# Patient Record
Sex: MALE | Race: WHITE | Employment: FULL TIME | URBAN - METROPOLITAN AREA
[De-identification: names, ages, dates, MRNs, and addresses within clinical notes are randomized per-mention and may not be internally consistent; named-entity substitution may affect disease eponyms.]

---

## 2018-07-01 ENCOUNTER — TRANSFERRED RECORDS (OUTPATIENT)
Dept: HEALTH INFORMATION MANAGEMENT | Facility: CLINIC | Age: 38
End: 2018-07-01

## 2018-08-06 ENCOUNTER — OFFICE VISIT (OUTPATIENT)
Dept: FAMILY MEDICINE | Facility: CLINIC | Age: 38
End: 2018-08-06
Payer: COMMERCIAL

## 2018-08-06 ENCOUNTER — MYC MEDICAL ADVICE (OUTPATIENT)
Dept: FAMILY MEDICINE | Facility: CLINIC | Age: 38
End: 2018-08-06

## 2018-08-06 VITALS
HEIGHT: 76 IN | DIASTOLIC BLOOD PRESSURE: 74 MMHG | SYSTOLIC BLOOD PRESSURE: 126 MMHG | RESPIRATION RATE: 16 BRPM | WEIGHT: 197 LBS | TEMPERATURE: 98 F | HEART RATE: 65 BPM | BODY MASS INDEX: 23.99 KG/M2

## 2018-08-06 DIAGNOSIS — N46.9 MALE INFERTILITY: Primary | ICD-10-CM

## 2018-08-06 DIAGNOSIS — N46.9 INFERTILITY MALE: Primary | ICD-10-CM

## 2018-08-06 DIAGNOSIS — N43.3 LEFT HYDROCELE: ICD-10-CM

## 2018-08-06 PROCEDURE — 99213 OFFICE O/P EST LOW 20 MIN: CPT | Performed by: FAMILY MEDICINE

## 2018-08-06 NOTE — PROGRESS NOTES
SUBJECTIVE:   Serjio Farris is a 38 year old male who presents to clinic today for the following health issues:      REFERRAL      Duration: discuss today    Description (location/character/radiation): for infertility    Intensity:  mild    Accompanying signs and symptoms: wife and pt trying to conceive past 5 years    History (similar episodes/previous evaluation): did a home test checking for sperms count and it come back positive for low sperm count    Precipitating or alleviating factors: None the patient does have what sounds like a hydrocele on the left side that he has had drained a couple of times and has recurred each time.    Therapies tried and outcome: None           Problem list and histories reviewed & adjusted, as indicated.  Additional history: as documented    Patient Active Problem List   Diagnosis     Diarrhea     Flatulence, eructation, and gas pain     Left hydrocele     Past Surgical History:   Procedure Laterality Date     COLONOSCOPY N/A 4/27/2015    Procedure: COLONOSCOPY;  Surgeon: Pako Iqbal MD;  Location: [facility]     COLONOSCOPY WITH CO2 INSUFFLATION N/A 4/27/2015    Procedure: COLONOSCOPY WITH CO2 INSUFFLATION;  Surgeon: Pako Iqbal MD;  Location: [facility]     ESOPHAGOSCOPY, GASTROSCOPY, DUODENOSCOPY (EGD), COMBINED Left 5/28/2015    Procedure: COMBINED ESOPHAGOSCOPY, GASTROSCOPY, DUODENOSCOPY (EGD), BIOPSY SINGLE OR MULTIPLE;  Surgeon: Pako Iqbal MD;  Location: [facility]     HC BREATH HYDROGEN TEST N/A 9/11/2015    Procedure: HYDROGEN BREATH TEST;  Surgeon: Pako Iqbal MD;  Location: [facility]     HC TOOTH EXTRACTION W/FORCEP      age 15     UROLOGY PROCEDURE                         DATE:  2012    Testicular Torsion       Social History   Substance Use Topics     Smoking status: Former Smoker     Types: Cigarettes     Quit date: 10/1/2014     Smokeless tobacco: Never Used      Comment: 3 cigs day 13 years     Alcohol use No     Family History   Problem Relation Age of  "Onset     Breast Cancer Maternal Grandmother      Other - See Comments Paternal Grandmother      Blood Clot     Depression Mother      Depression Maternal Grandfather      Cerebrovascular Disease Paternal Grandmother            Reviewed and updated as needed this visit by clinical staff  Tobacco  Allergies  Meds  Med Hx  Surg Hx  Fam Hx  Soc Hx      Reviewed and updated as needed this visit by Provider         ROS:  Constitutional, HEENT, cardiovascular, pulmonary, gi and gu systems are negative, except as otherwise noted.    OBJECTIVE:                                                    /74 (Cuff Size: Adult Regular)  Pulse 65  Temp 98  F (36.7  C) (Tympanic)  Resp 16  Ht 6' 4\" (1.93 m)  Wt 197 lb (89.4 kg)  BMI 23.98 kg/m2  Body mass index is 23.98 kg/(m^2).  GENERAL APPEARANCE: healthy, alert and no distress   (male): testicles normal without atrophy or masses, no hernias, penis normal without urethral discharge and hydrocele present left         ASSESSMENT/PLAN:                                                        ICD-10-CM    1. Infertility male N46.9 Semen Analysis, Strict Morphology (ALICE)   2. Left hydrocele N43.3        Patient Instructions   I will set the patient up for a semen analysis.  Follow-up will be pending review of that test.      Bradford Bowman MD  [facility]    "

## 2018-08-06 NOTE — MR AVS SNAPSHOT
"              After Visit Summary   8/6/2018    Serjio Farris    MRN: [id number]           Patient Information     Date Of Birth          1980        Visit Information        Provider Department      8/6/2018 2:00 PM Bradford Bowman MD [facility]        Today's Diagnoses     Infertility male    -  1       Follow-ups after your visit        Future tests that were ordered for you today     Open Future Orders        Priority Expected Expires Ordered    Semen Analysis, Strict Morphology (ALICE) Routine  10/6/2018 8/6/2018            Who to contact     If you have questions or need follow up information about today's clinic visit or your schedule please contact [facility] directly at [phone].  Normal or non-critical lab and imaging results will be communicated to you by MyChart, letter or phone within 4 business days after the clinic has received the results. If you do not hear from us within 7 days, please contact the clinic through [facility] or phone. If you have a critical or abnormal lab result, we will notify you by phone as soon as possible.  Submit refill requests through [employer] or call your pharmacy and they will forward the refill request to us. Please allow 3 business days for your refill to be completed.          Additional Information About Your Visit        [employer] Information     [facility] lets you send messages to your doctor, view your test results, renew your prescriptions, schedule appointments and more. To sign up, go to [email or web address] . Click on \"Log in\" on the left side of the screen, which will take you to the Welcome page. Then click on \"Sign up Now\" on the right side of the page.     You will be asked to enter the access code listed below, as well as some personal information. Please follow the directions to create your username and password.     Your access code is: [email or web address]  Expires: 11/4/2018  1:56 PM   " "  Your access code will  in 90 days. If you need help or a new code, please call your [facility] clinic or [phone].        Care EveryWhere ID     This is your Care EveryWhere ID. This could be used by other organizations to access your [facility] medical records  [id number]        Your Vitals Were     Pulse Temperature Respirations Height BMI (Body Mass Index)       65 98  F (36.7  C) (Tympanic) 16 6' 4\" (1.93 m) 23.98 kg/m2        Blood Pressure from Last 3 Encounters:   18 126/74   09/28/15 146/63   07/14/15 119/70    Weight from Last 3 Encounters:   18 197 lb (89.4 kg)   09/28/15 175 lb 6.4 oz (79.6 kg)   09/11/15 180 lb (81.6 kg)               Primary Care Provider Office Phone # Fax #    Mae HARRIS Adriano, APRN [facility] [phone] [address] [place] [address]        Equal Access to Services     [facility]: Hadii aad ku hadasho Soomaali, waaxda luqadaha, qaybta kaalmada adeegyada, waxnoe moreira . So [facility] [phone].    ATENCIÓN: Si habla español, tiene a green disposición servicios gratuitos de asistencia lingüística. Llame al [phone].    We comply with applicable federal civil rights laws and [place] laws. We do not discriminate on the basis of race, color, national origin, age, disability, sex, sexual orientation, or gender identity.            Thank you!     Thank you for choosing [facility]  for your care. Our goal is always to provide you with excellent care. Hearing back from our patients is one way we can continue to improve our services. Please take a few minutes to complete the written survey that you may receive in the mail after your visit with us. Thank you!             Your Updated Medication List - Protect others around you: Learn how to safely use, store and throw away your medicines at www.disposemymeds.org.          This list is accurate as of 8/6/18  2:22 PM.  Always use " your most recent med list.                   Brand Name Dispense Instructions for use Diagnosis    hydrocortisone 25 MG Suppository    ANUSOL-HC    7 suppository    Place 1 suppository (25 mg) rectally 2 times daily    Hemorrhoids       magic mouthwash suspension    ENTER INGREDIENTS IN COMMENTS    180 mL    Swish and spit 5-10 mLs in mouth every 6 hours as needed    Oral ulcer       vitamin B complex with vitamin C Tabs tablet      Take 1 tablet by mouth daily

## 2018-08-10 ENCOUNTER — MYC MEDICAL ADVICE (OUTPATIENT)
Dept: FAMILY MEDICINE | Facility: CLINIC | Age: 38
End: 2018-08-10

## 2018-08-10 DIAGNOSIS — Z31.41 FERTILITY TESTING: Primary | ICD-10-CM

## 2018-08-10 DIAGNOSIS — Z31.41 ENCOUNTER FOR SPERM COUNT FOR FERTILITY TESTING: ICD-10-CM

## 2018-08-10 NOTE — TELEPHONE ENCOUNTER
Please advice. For semen analysis pended. I am not sure if is correct order. Pt needed for fertility purposes.

## 2018-08-10 NOTE — TELEPHONE ENCOUNTER
Future lab order was printed and to [facility] at fax   [phone]

## 2018-08-22 ENCOUNTER — TRANSFERRED RECORDS (OUTPATIENT)
Dept: HEALTH INFORMATION MANAGEMENT | Facility: CLINIC | Age: 38
End: 2018-08-22

## 2018-08-29 NOTE — TELEPHONE ENCOUNTER
Serjio,    Your semen analysis shows that your sperm count is about one third lower than it should be.  I have put in a order for a referral to urology to have them take a look.  If you have any questions feel free to contact me.    Sincerely,    Brdaford Bowman MD

## 2018-08-31 ENCOUNTER — MYC MEDICAL ADVICE (OUTPATIENT)
Dept: FAMILY MEDICINE | Facility: CLINIC | Age: 38
End: 2018-08-31

## 2019-10-22 ENCOUNTER — MYC MEDICAL ADVICE (OUTPATIENT)
Dept: FAMILY MEDICINE | Facility: CLINIC | Age: 39
End: 2019-10-22

## 2019-10-31 ENCOUNTER — MYC MEDICAL ADVICE (OUTPATIENT)
Dept: FAMILY MEDICINE | Facility: CLINIC | Age: 39
End: 2019-10-31

## 2019-11-01 NOTE — TELEPHONE ENCOUNTER
Please check first thing Monday AM for patient labs and add to OV noted.      Monday by patient.   Also fax to [facility] for patient.

## 2019-11-01 NOTE — TELEPHONE ENCOUNTER
Cant print out form requested for my patient at this time. Epic issue currently being dealt with.     Will come back to this question in a couple hours.

## 2019-11-01 NOTE — TELEPHONE ENCOUNTER
[employer] message sent to patient updating on status.     Please keep on board until this issues has been resolved for patient.

## 2020-02-25 ENCOUNTER — HOSPITAL ENCOUNTER (EMERGENCY)
Facility: CLINIC | Age: 40
Discharge: HOME | End: 2020-02-25
Attending: EMERGENCY MEDICINE | Admitting: EMERGENCY MEDICINE
Payer: COMMERCIAL

## 2020-02-25 ENCOUNTER — APPOINTMENT (OUTPATIENT)
Dept: CT IMAGING | Facility: CLINIC | Age: 40
End: 2020-02-25
Attending: EMERGENCY MEDICINE
Payer: COMMERCIAL

## 2020-02-25 VITALS
HEIGHT: 76 IN | RESPIRATION RATE: 16 BRPM | SYSTOLIC BLOOD PRESSURE: 117 MMHG | TEMPERATURE: 98 F | DIASTOLIC BLOOD PRESSURE: 82 MMHG | HEART RATE: 75 BPM | OXYGEN SATURATION: 100 % | WEIGHT: 190 LBS | BODY MASS INDEX: 23.14 KG/M2

## 2020-02-25 DIAGNOSIS — R10.9 ABDOMINAL PAIN, UNSPECIFIED ABDOMINAL LOCATION: ICD-10-CM

## 2020-02-25 DIAGNOSIS — R19.7 NAUSEA VOMITING AND DIARRHEA: ICD-10-CM

## 2020-02-25 DIAGNOSIS — R11.2 NAUSEA VOMITING AND DIARRHEA: ICD-10-CM

## 2020-02-25 LAB
ALBUMIN SERPL-MCNC: 3.8 G/DL (ref 3.4–5)
ALP SERPL-CCNC: 70 U/L (ref 40–150)
ALT SERPL W P-5'-P-CCNC: 19 U/L (ref 0–70)
ANION GAP SERPL CALCULATED.3IONS-SCNC: 4 MMOL/L (ref 3–14)
AST SERPL W P-5'-P-CCNC: 14 U/L (ref 0–45)
BASOPHILS # BLD AUTO: 0 10E9/L (ref 0–0.2)
BASOPHILS NFR BLD AUTO: 0.2 %
BILIRUB SERPL-MCNC: 1.2 MG/DL (ref 0.2–1.3)
BUN SERPL-MCNC: 13 MG/DL (ref 7–30)
CALCIUM SERPL-MCNC: 9.3 MG/DL (ref 8.5–10.1)
CHLORIDE SERPL-SCNC: 106 MMOL/L (ref 94–109)
CO2 SERPL-SCNC: 26 MMOL/L (ref 20–32)
CREAT SERPL-MCNC: 1.03 MG/DL (ref 0.66–1.25)
DIFFERENTIAL METHOD BLD: NORMAL
EOSINOPHIL # BLD AUTO: 0.1 10E9/L (ref 0–0.7)
EOSINOPHIL NFR BLD AUTO: 0.8 %
ERYTHROCYTE [DISTWIDTH] IN BLOOD BY AUTOMATED COUNT: 11.9 % (ref 10–15)
GFR SERPL CREATININE-BSD FRML MDRD: >90 ML/MIN/{1.73_M2}
GLUCOSE SERPL-MCNC: 102 MG/DL (ref 70–99)
HCT VFR BLD AUTO: 41.9 % (ref 40–53)
HGB BLD-MCNC: 14.7 G/DL (ref 13.3–17.7)
IMM GRANULOCYTES # BLD: 0 10E9/L (ref 0–0.4)
IMM GRANULOCYTES NFR BLD: 0.3 %
LACTATE BLD-SCNC: 1.7 MMOL/L (ref 0.7–2)
LIPASE SERPL-CCNC: 130 U/L (ref 73–393)
LYMPHOCYTES # BLD AUTO: 1 10E9/L (ref 0.8–5.3)
LYMPHOCYTES NFR BLD AUTO: 16.2 %
MCH RBC QN AUTO: 29.9 PG (ref 26.5–33)
MCHC RBC AUTO-ENTMCNC: 35.1 G/DL (ref 31.5–36.5)
MCV RBC AUTO: 85 FL (ref 78–100)
MONOCYTES # BLD AUTO: 0.9 10E9/L (ref 0–1.3)
MONOCYTES NFR BLD AUTO: 14.9 %
NEUTROPHILS # BLD AUTO: 4.2 10E9/L (ref 1.6–8.3)
NEUTROPHILS NFR BLD AUTO: 67.6 %
NRBC # BLD AUTO: 0 10*3/UL
NRBC BLD AUTO-RTO: 0 /100
PLATELET # BLD AUTO: 152 10E9/L (ref 150–450)
POTASSIUM SERPL-SCNC: 3.6 MMOL/L (ref 3.4–5.3)
PROT SERPL-MCNC: 7.5 G/DL (ref 6.8–8.8)
RBC # BLD AUTO: 4.92 10E12/L (ref 4.4–5.9)
SODIUM SERPL-SCNC: 136 MMOL/L (ref 133–144)
WBC # BLD AUTO: 6.2 10E9/L (ref 4–11)

## 2020-02-25 PROCEDURE — 85025 COMPLETE CBC W/AUTO DIFF WBC: CPT | Performed by: EMERGENCY MEDICINE

## 2020-02-25 PROCEDURE — 25000128 H RX IP 250 OP 636: Performed by: EMERGENCY MEDICINE

## 2020-02-25 PROCEDURE — 99285 EMERGENCY DEPT VISIT HI MDM: CPT | Mod: 25

## 2020-02-25 PROCEDURE — 96375 TX/PRO/DX INJ NEW DRUG ADDON: CPT

## 2020-02-25 PROCEDURE — 25000125 ZZHC RX 250: Performed by: EMERGENCY MEDICINE

## 2020-02-25 PROCEDURE — 83690 ASSAY OF LIPASE: CPT | Performed by: EMERGENCY MEDICINE

## 2020-02-25 PROCEDURE — 80053 COMPREHEN METABOLIC PANEL: CPT | Performed by: EMERGENCY MEDICINE

## 2020-02-25 PROCEDURE — 83605 ASSAY OF LACTIC ACID: CPT | Performed by: EMERGENCY MEDICINE

## 2020-02-25 PROCEDURE — 96361 HYDRATE IV INFUSION ADD-ON: CPT

## 2020-02-25 PROCEDURE — 96374 THER/PROPH/DIAG INJ IV PUSH: CPT | Mod: 59

## 2020-02-25 PROCEDURE — 25800030 ZZH RX IP 258 OP 636: Performed by: EMERGENCY MEDICINE

## 2020-02-25 PROCEDURE — 74177 CT ABD & PELVIS W/CONTRAST: CPT

## 2020-02-25 RX ORDER — ONDANSETRON 2 MG/ML
4 INJECTION INTRAMUSCULAR; INTRAVENOUS ONCE
Status: COMPLETED | OUTPATIENT
Start: 2020-02-25 | End: 2020-02-25

## 2020-02-25 RX ORDER — SODIUM CHLORIDE 9 MG/ML
1000 INJECTION, SOLUTION INTRAVENOUS CONTINUOUS
Status: DISCONTINUED | OUTPATIENT
Start: 2020-02-25 | End: 2020-02-25 | Stop reason: HOSPADM

## 2020-02-25 RX ORDER — KETOROLAC TROMETHAMINE 15 MG/ML
15 INJECTION, SOLUTION INTRAMUSCULAR; INTRAVENOUS ONCE
Status: COMPLETED | OUTPATIENT
Start: 2020-02-25 | End: 2020-02-25

## 2020-02-25 RX ORDER — IOPAMIDOL 755 MG/ML
95 INJECTION, SOLUTION INTRAVASCULAR ONCE
Status: COMPLETED | OUTPATIENT
Start: 2020-02-25 | End: 2020-02-25

## 2020-02-25 RX ORDER — ONDANSETRON 4 MG/1
4 TABLET, ORALLY DISINTEGRATING ORAL EVERY 8 HOURS PRN
Qty: 12 TABLET | Refills: 0 | Status: SHIPPED | OUTPATIENT
Start: 2020-02-25 | End: 2020-02-25

## 2020-02-25 RX ORDER — ONDANSETRON 4 MG/1
4 TABLET, ORALLY DISINTEGRATING ORAL EVERY 8 HOURS PRN
Qty: 12 TABLET | Refills: 0 | Status: SHIPPED | OUTPATIENT
Start: 2020-02-25 | End: 2022-06-17

## 2020-02-25 RX ADMIN — SODIUM CHLORIDE 69 ML: 9 INJECTION, SOLUTION INTRAVENOUS at 13:27

## 2020-02-25 RX ADMIN — ONDANSETRON 4 MG: 2 INJECTION INTRAMUSCULAR; INTRAVENOUS at 12:45

## 2020-02-25 RX ADMIN — KETOROLAC TROMETHAMINE 15 MG: 15 INJECTION, SOLUTION INTRAMUSCULAR; INTRAVENOUS at 14:28

## 2020-02-25 RX ADMIN — IOPAMIDOL 95 ML: 755 INJECTION, SOLUTION INTRAVENOUS at 13:27

## 2020-02-25 RX ADMIN — SODIUM CHLORIDE 1000 ML: 9 INJECTION, SOLUTION INTRAVENOUS at 12:46

## 2020-02-25 ASSESSMENT — ENCOUNTER SYMPTOMS
CHILLS: 1
NAUSEA: 1
BLOOD IN STOOL: 0
DIARRHEA: 1
ABDOMINAL PAIN: 1
CONSTIPATION: 0
VOMITING: 1
FEVER: 0

## 2020-02-25 ASSESSMENT — MIFFLIN-ST. JEOR: SCORE: 1883.33

## 2020-02-25 NOTE — ED AVS SNAPSHOT
Emergency Department  [address] [facility] [address]  Phone:  [phone]  Fax:  [phone]                                    Serjio Farris   MRN: [id number]    Department:   Emergency Department   Date of Visit:  2/25/2020           After Visit Summary Signature Page    I have received my discharge instructions, and my questions have been answered. I have discussed any challenges I see with this plan with the nurse or doctor.    ..........................................................................................................................................  Patient/Patient Representative Signature      ..........................................................................................................................................  Patient Representative Print Name and Relationship to Patient    ..................................................               ................................................  Date                                   Time    ..........................................................................................................................................  Reviewed by Signature/Title    ...................................................              ..............................................  Date                                               Time          22EPIC Rev 08/18

## 2020-02-25 NOTE — ED PROVIDER NOTES
"History     Chief Complaint:  Abdominal Pain and Nausea, Vomiting, & Diarrhea    HPI  Serjio Farris is a 39 year old male who presents to the emergency department for evaluation of abdominal pain and vomiting. The patient began vomiting early yesterday morning and was unable to keep fluids down during the day. He then developed a fever, chills, and diffuse abdominal pain worst in the right lower quadrant. The patient has not vomited today but is now having diarrhea. The patient was seen at 2 different urgent cares today with a negative UA and strep test. He and his wife are concerned that part of the cause could be a stew the patient ate. This was sitting in the fridge for a week before he ate some the night before his symptoms began. He did not get a flu shot this year.       Allergies:  No known drug allergies    Medications:    Hydrocortisone    Past Medical History:    Stroke  Left hydrocele     Past Surgical History:    urology procedure    Family History:    History reviewed. No pertinent family history.     Social History:  The patient arrives with wife  Smoking: former quit 2014  Alcohol use: no  PCP: Mae Oh  Marital Status:  [2]      Review of Systems   Constitutional: Positive for chills. Negative for fever.   Gastrointestinal: Positive for abdominal pain, diarrhea, nausea and vomiting. Negative for blood in stool and constipation.   All other systems reviewed and are negative.    Physical Exam     Patient Vitals for the past 24 hrs:   BP Temp Temp src Heart Rate Resp SpO2 Height Weight   02/25/20 1224 122/60 98  F (36.7  C) Temporal 79 16 100 % 1.93 m (6' 4\") 86.2 kg (190 lb)     Physical Exam  Physical Exam   General:  Sitting on bed with wife at bedside.   HENT:  No obvious trauma to head  Right Ear:  External ear normal.   Left Ear:  External ear normal.   Nose:  Nose normal.   Eyes:  Conjunctivae and EOM are normal. Pupils are equal, round, and reactive.   Neck: Normal " range of motion. Neck supple. No tracheal deviation present.   CV:  Normal heart sounds. No murmur heard.  Pulm/Chest: Effort normal and breath sounds normal.   Abd: Soft. No distension. Mild right lower quadrant tenderness. There is no rigidity, no rebound and no guarding.   M/S: Normal range of motion.   Neuro: Alert. GCS 15.  Skin: Skin is warm and dry. No rash noted. Not diaphoretic.   Psych: Normal mood and affect. Behavior is normal.     Emergency Department Course     Imaging:  Radiology findings were communicated with the patient who voiced understanding of the findings.    CT Abdomen Pelvis w contrast   IMPRESSION:   1.  Question of mild edema of the duodenum and potentially involving the pancreatic head. Correlate with duodenitis and edematous pancreatitis.  2.  No other acute abnormality is seen. Normal appendix.  3.  Suggestion of a left scrotal hydrocele  Reading per radiology    Laboratory:  Laboratory findings were communicated with the patient who voiced understanding of the findings.    CBC:  o/w WNL. (WBC 6.2, HGB 14.7, )   CMP: Glucose 102 (H), o/w WNL (Creatinine: 1.03)    Lactic Acid: 1.7  Lipase: 130    Interventions:  1245 Zofran 4mg IV injection   1246 NS 1L IV Bolus  1425 Toradol 15 mg IV    Emergency Department Course:  Past medical records, nursing notes, and vitals reviewed.  1229: I performed an exam of the patient and obtained history, as documented above.     IV was inserted and blood was drawn for laboratory testing, results above.  The patient was sent for a CT while in the emergency department, findings above    1415: I rechecked the patient. Explained findings to patient who is now feeling much improved. He desires to go home and is declining the influenza test at this time.    I personally reviewed the laboratory and imaging results with the Patient and spouse and answered all related questions prior to discharge.     Findings and plan explained to the Patient and spouse.  Patient discharged home with instructions regarding supportive care, medications, and reasons to return. The importance of close follow-up was reviewed.   Impression & Plan      Medical Decision Making:  Serjio Farris is a very pleasant 39 year old male who presents to the emergency department today for evaluation of nausea, vomiting and diarrhea.  The patient has mild diffuse abdominal tenderness, low but more notable in the right lower quadrant.  His symptoms began the day after eating a stew that had been in the fridge for approximately 1 week.  The patient was uncomfortable appearing.  Because of this labs were performed.  Fortunately these were unremarkable.  Due to his abdominal discomfort and symptoms, I also performed a CT scan to assess for appendicitis.  This was negative for appendicitis.  It did show some inflammation around the duodenum and pancreas.  Liver enzymes and lipase are negative.  I doubt this represents pancreatitis.  He felt better after the Zofran, fluids and Toradol.  I suspect this is related to the food that he ingested.  I do not believe stool cultures are necessary at this time.  I discussed if his symptoms are persistent beyond a few more days that stool cultures could be considered.  He agreed.    The treatment plan was discussed with the patient and they expressed understanding of this plan and consented to the plan.  In addition, the patient will return to the emergency department if their symptoms persist, worsen, if new symptoms arise or if there is any concern as other pathology may be present that is not evident at this time. They also understand the importance of close follow up in the clinic and if unable to do so will return to the emergency department for a reevaluation. All questions were answered.    Diagnosis:    ICD-10-CM   1. Nausea vomiting and diarrhea R11.2    R19.7   2. Abdominal pain, unspecified abdominal location R10.9       Disposition:   discharged to  home    Discharge Medications:     Medication List      Started    ondansetron 4 MG ODT tab  Commonly known as:  Zofran ODT  4 mg, Oral, EVERY 8 HOURS PRN            Scribe Disclosure:  I, Vannesa Florian, am serving as a scribe at 12:29 PM on 2/25/2020 to document services personally performed by Cristian Alejandre DO based on my observations and the provider's statements to me.     EMERGENCY DEPARTMENT       Cristian Alejandre DO  02/25/20 4315

## 2020-02-25 NOTE — ED TRIAGE NOTES
Sunday night into Monday bad diarrhea and vomiting for 24hrs. Unable to keep anything down. Upper abdominal pain that has gotten worse.

## 2020-03-10 ENCOUNTER — HEALTH MAINTENANCE LETTER (OUTPATIENT)
Age: 40
End: 2020-03-10

## 2020-11-04 DIAGNOSIS — Z11.3 SCREENING EXAMINATION FOR VENEREAL DISEASE: Primary | ICD-10-CM

## 2020-11-04 LAB
HBV SURFACE AB SERPL IA-ACNC: 0 M[IU]/ML
HBV SURFACE AG SERPL QL IA: NONREACTIVE
HCV AB SERPL QL IA: NONREACTIVE
HIV 1+2 AB+HIV1 P24 AG SERPL QL IA: NONREACTIVE

## 2020-11-04 PROCEDURE — 99000 SPECIMEN HANDLING OFFICE-LAB: CPT | Performed by: OBSTETRICS & GYNECOLOGY

## 2020-11-04 PROCEDURE — 87389 HIV-1 AG W/HIV-1&-2 AB AG IA: CPT | Performed by: OBSTETRICS & GYNECOLOGY

## 2020-11-04 PROCEDURE — 86780 TREPONEMA PALLIDUM: CPT | Mod: 90 | Performed by: OBSTETRICS & GYNECOLOGY

## 2020-11-04 PROCEDURE — 86706 HEP B SURFACE ANTIBODY: CPT | Performed by: OBSTETRICS & GYNECOLOGY

## 2020-11-04 PROCEDURE — 86803 HEPATITIS C AB TEST: CPT | Performed by: OBSTETRICS & GYNECOLOGY

## 2020-11-04 PROCEDURE — 36415 COLL VENOUS BLD VENIPUNCTURE: CPT | Performed by: OBSTETRICS & GYNECOLOGY

## 2020-11-04 PROCEDURE — 87340 HEPATITIS B SURFACE AG IA: CPT | Performed by: OBSTETRICS & GYNECOLOGY

## 2020-11-05 LAB — T PALLIDUM AB SER QL: NONREACTIVE

## 2020-12-20 ENCOUNTER — HEALTH MAINTENANCE LETTER (OUTPATIENT)
Age: 40
End: 2020-12-20

## 2021-03-31 ENCOUNTER — IMMUNIZATION (OUTPATIENT)
Dept: NURSING | Facility: CLINIC | Age: 41
End: 2021-03-31
Payer: COMMERCIAL

## 2021-03-31 PROCEDURE — 91300 PR COVID VAC PFIZER DIL RECON 30 MCG/0.3 ML IM: CPT

## 2021-03-31 PROCEDURE — 0001A PR COVID VAC PFIZER DIL RECON 30 MCG/0.3 ML IM: CPT

## 2021-04-21 ENCOUNTER — IMMUNIZATION (OUTPATIENT)
Dept: NURSING | Facility: CLINIC | Age: 41
End: 2021-04-21
Attending: INTERNAL MEDICINE
Payer: COMMERCIAL

## 2021-04-21 PROCEDURE — 0002A PR COVID VAC PFIZER DIL RECON 30 MCG/0.3 ML IM: CPT

## 2021-04-21 PROCEDURE — 91300 PR COVID VAC PFIZER DIL RECON 30 MCG/0.3 ML IM: CPT

## 2021-04-24 ENCOUNTER — HEALTH MAINTENANCE LETTER (OUTPATIENT)
Age: 41
End: 2021-04-24

## 2021-10-03 ENCOUNTER — HEALTH MAINTENANCE LETTER (OUTPATIENT)
Age: 41
End: 2021-10-03

## 2022-02-01 ENCOUNTER — OFFICE VISIT (OUTPATIENT)
Dept: URGENT CARE | Facility: URGENT CARE | Age: 42
End: 2022-02-01
Payer: COMMERCIAL

## 2022-02-01 ENCOUNTER — NURSE TRIAGE (OUTPATIENT)
Dept: NURSING | Facility: CLINIC | Age: 42
End: 2022-02-01

## 2022-02-01 ENCOUNTER — ANCILLARY PROCEDURE (OUTPATIENT)
Dept: GENERAL RADIOLOGY | Facility: CLINIC | Age: 42
End: 2022-02-01
Attending: PHYSICIAN ASSISTANT
Payer: COMMERCIAL

## 2022-02-01 VITALS
HEART RATE: 72 BPM | TEMPERATURE: 98.3 F | WEIGHT: 201.9 LBS | SYSTOLIC BLOOD PRESSURE: 120 MMHG | RESPIRATION RATE: 16 BRPM | BODY MASS INDEX: 24.58 KG/M2 | DIASTOLIC BLOOD PRESSURE: 72 MMHG | OXYGEN SATURATION: 100 %

## 2022-02-01 DIAGNOSIS — R05.9 COUGH: ICD-10-CM

## 2022-02-01 DIAGNOSIS — J22 LRTI (LOWER RESPIRATORY TRACT INFECTION): ICD-10-CM

## 2022-02-01 DIAGNOSIS — J22 LRTI (LOWER RESPIRATORY TRACT INFECTION): Primary | ICD-10-CM

## 2022-02-01 DIAGNOSIS — Z20.822 PERSON UNDER INVESTIGATION FOR COVID-19: ICD-10-CM

## 2022-02-01 PROCEDURE — 71046 X-RAY EXAM CHEST 2 VIEWS: CPT | Performed by: RADIOLOGY

## 2022-02-01 PROCEDURE — U0003 INFECTIOUS AGENT DETECTION BY NUCLEIC ACID (DNA OR RNA); SEVERE ACUTE RESPIRATORY SYNDROME CORONAVIRUS 2 (SARS-COV-2) (CORONAVIRUS DISEASE [COVID-19]), AMPLIFIED PROBE TECHNIQUE, MAKING USE OF HIGH THROUGHPUT TECHNOLOGIES AS DESCRIBED BY CMS-2020-01-R: HCPCS | Performed by: PHYSICIAN ASSISTANT

## 2022-02-01 PROCEDURE — 99204 OFFICE O/P NEW MOD 45 MIN: CPT | Performed by: PHYSICIAN ASSISTANT

## 2022-02-01 RX ORDER — AMOXICILLIN 500 MG/1
1000 CAPSULE ORAL 3 TIMES DAILY
Qty: 60 CAPSULE | Refills: 0 | Status: SHIPPED | OUTPATIENT
Start: 2022-02-01 | End: 2022-02-01

## 2022-02-01 RX ORDER — AZITHROMYCIN 250 MG/1
TABLET, FILM COATED ORAL
Qty: 6 TABLET | Refills: 0 | Status: SHIPPED | OUTPATIENT
Start: 2022-02-01 | End: 2022-06-17

## 2022-02-01 RX ORDER — AZITHROMYCIN 250 MG/1
TABLET, FILM COATED ORAL
Qty: 6 TABLET | Refills: 0 | Status: SHIPPED | OUTPATIENT
Start: 2022-02-01 | End: 2022-02-01

## 2022-02-01 RX ORDER — CODEINE PHOSPHATE AND GUAIFENESIN 10; 100 MG/5ML; MG/5ML
1-2 SOLUTION ORAL
Qty: 50 ML | Refills: 0 | Status: SHIPPED | OUTPATIENT
Start: 2022-02-01 | End: 2022-06-17

## 2022-02-01 RX ORDER — AMOXICILLIN 500 MG/1
1000 CAPSULE ORAL 3 TIMES DAILY
Qty: 60 CAPSULE | Refills: 0 | Status: SHIPPED | OUTPATIENT
Start: 2022-02-01 | End: 2022-06-17

## 2022-02-01 NOTE — LETTER
[facility] [place]  [address]  [address]  [place] [address]  Phone: [phone]  Fax: [phone]    February 1, 2022        Serjio Farris  [address] [address]          To whom it may concern:    RE: Serjio Farris    Patient was seen and treated today at our clinic.  Please excuse absences on 2/2 and 2/3/22.    Please contact me for questions or concerns.      Sincerely,        Dat Gross PA-C

## 2022-02-02 LAB — SARS-COV-2 RNA RESP QL NAA+PROBE: NORMAL

## 2022-02-02 NOTE — TELEPHONE ENCOUNTER
Consent given by patient to speak to wife.   PCP: none . Intends to go to [facility] in the future.   UC earlier today. 2 antibiotic prescriptions were sent to the pharmacy: [facility] [address]. They are now closed and they are unable to pick them up. .   Please retransmit to: [facility] [address], in [place]. (done @ 8:13pm).     Jennifer Ho RN Triage Nurse Advisor 8:14 PM 2/1/2022    Reason for Disposition    [1] Prescription prescribed recently is not at pharmacy AND [2] triager has access to patient's EMR AND [3] prescription is recorded in the EMR    Additional Information    Negative: Drug overdose and triager unable to answer question    Negative: Caller requesting information unrelated to medicine    Negative: Caller requesting a prescription for Strep throat and has a positive culture result    Negative: Rash while taking a medication or within 3 days of stopping it    Negative: Immunization reaction suspected    Negative: [1] Asthma and [2] having symptoms of asthma (cough, wheezing, etc.)    Negative: [1] Influenza symptoms AND [2] anti-viral med prescription request, such as Tamiflu    Negative: [1] Symptom of illness (e.g., headache, abdominal pain, earache, vomiting) AND [2] more than mild    Negative: MORE THAN A DOUBLE DOSE of a prescription or over-the-counter (OTC) drug    Negative: [1] DOUBLE DOSE (an extra dose or lesser amount) of over-the-counter (OTC) drug AND [2] any symptoms (e.g., dizziness, nausea, pain, sleepiness)    Negative: [1] DOUBLE DOSE (an extra dose or lesser amount) of prescription drug AND [2] any symptoms (e.g., dizziness, nausea, pain, sleepiness)    Negative: Took another person's prescription drug    Negative: [1] DOUBLE DOSE (an extra dose or lesser amount) of prescription drug AND [2] NO symptoms (Exception: a double dose of antibiotics)    Negative: Diabetes drug error or overdose (e.g., took wrong type of insulin or took extra  "dose)    Negative: [1] Request for URGENT new prescription or refill of \"essential\" medication (i.e., likelihood of harm to patient if not taken) AND [2] triager unable to fill per unit policy    Negative: [1] Prescription not at pharmacy AND [2] was prescribed by PCP recently    Negative: [1] Pharmacy calling with prescription questions AND [2] triager unable to answer question    Negative: [1] Caller has URGENT medication question about med that PCP or specialist prescribed AND [2] triager unable to answer question    Negative: [1] Caller has NON-URGENT medication question about med that PCP prescribed AND [2] triager unable to answer question    Negative: [1] Caller requesting a NON-URGENT new prescription or refill AND [2] triager unable to refill per unit policy    Negative: [1] Caller has medication question about med not prescribed by PCP AND [2] triager unable to answer question (e.g., compatibility with other med, storage)    Negative: Caller requesting a CONTROLLED substance prescription refill (e.g., narcotics, ADHD medicines)    Negative: Caller wants to use a complementary or alternative medicine    Protocols used: MEDICATION QUESTION CALL-A-  COVID 19 Nurse Triage Plan/Patient Instructions    Please be aware that novel coronavirus (COVID-19) may be circulating in the community. If you develop symptoms such as fever, cough, or SOB or if you have concerns about the presence of another infection including coronavirus (COVID-19), please contact your health care provider or visit [email or web address].     Disposition/Instructions    Home care recommended. Follow home care protocol based instructions.    Thank you for taking steps to prevent the spread of this virus.  o Limit your contact with others.  o Wear a simple mask to cover your cough.  o Wash your hands well and often.    Resources    [facility]: About COVID-19: [email or web address]    CDC: What to Do If You're Sick: " www.cdc.gov/coronavirus/2019-ncov/about/steps-when-sick.html    CDC: Ending Home Isolation: www.cdc.gov/coronavirus/2019-ncov/hcp/disposition-in-home-patients.html     CDC: Caring for Someone: www.cdc.gov/coronavirus/2019-ncov/if-you-are-sick/care-for-someone.html     [facility]: Interim Guidance for Hospital Discharge to Home: www.[facility].[place]./diseases/coronavirus/hcp/hospdischarge.pdf    [facility] clinical trials (COVID-19 research studies): clinicalaffairs.[facility]-clinical-trials     Below are the COVID-19 hotlines at the [place] Department of [facility] ([facility]). Interpreters are available.   o For health questions: Call 883-352-6057 or 1-929.993.8634 (7 a.m. to 7 p.m.)  o For questions about schools and childcare: Call 455-616-1056 or 1-184.469.5733 (7 a.m. to 7 p.m.)

## 2022-02-02 NOTE — PROGRESS NOTES
SUBJECTIVE:   Serjio Farris is a 41 year old male presenting with a chief complaint of cough - non-productive and burning in chest.  Onset of symptoms was 2 day(s) ago.  Course of illness is cold for 2 weeks  Severity moderate  Current and Associated symptoms: cough - non-productive for a 3-4 days ago      Past Medical History:   Diagnosis Date     Stroke (H) age 25 approx    possible     Current Outpatient Medications   Medication Sig Dispense Refill     hydrocortisone (ANUSOL-HC) 25 MG suppository Place 1 suppository (25 mg) rectally 2 times daily (Patient not taking: Reported on 2018) 7 suppository 0     MAGIC MOUTHWASH, ENTER INGREDIENTS IN COMMENTS, Swish and spit 5-10 mLs in mouth every 6 hours as needed (Patient not taking: Reported on 2018) 180 mL 1     ondansetron (ZOFRAN ODT) 4 MG ODT tab Take 1 tablet (4 mg) by mouth every 8 hours as needed for nausea (Patient not taking: Reported on 2022) 12 tablet 0     vitamin  B complex with vitamin C (VITAMIN  B COMPLEX) TABS Take 1 tablet by mouth daily (Patient not taking: Reported on 2022)       Social History     Tobacco Use     Smoking status: Former Smoker     Types: Cigarettes     Quit date: 10/1/2014     Years since quittin.3     Smokeless tobacco: Never Used     Tobacco comment: 3 cigs day 13 years   Substance Use Topics     Alcohol use: No       ROS:  Review of systems negative except as stated above.    OBJECTIVE:  /72   Pulse 72   Temp 98.3  F (36.8  C)   Resp 16   Wt 91.6 kg (201 lb 14.4 oz)   SpO2 100%   BMI 24.58 kg/m    GENERAL APPEARANCE: healthy, alert and no distress  EYES: EOMI,  PERRL, conjunctiva clear  HENT: ear canals and TM's normal.  Nose and mouth without ulcers, erythema or lesions  NECK: supple, nontender, no lymphadenopathy  RESP: lungs clear to auscultation - no rales, rhonchi or wheezes  CV: regular rates and rhythm, normal S1 S2, no murmur noted  ABDOMEN:  soft, nontender, no HSM or masses and bowel  sounds normal  NEURO: Normal strength and tone, sensory exam grossly normal,  normal speech and mentation  SKIN: no suspicious lesions or rashes    X-ray image initially interpreted in clinic by me in order to rule out pneumonia.  presence of consolidation in noted in lateral image.        ASSESSMENT:  (J22) LRTI (lower respiratory tract infection)  (primary encounter diagnosis)  Plan: guaiFENesin-codeine (ROBITUSSIN AC) 100-10         MG/5ML solution, DISCONTINUED: azithromycin         (ZITHROMAX) 250 MG tablet, DISCONTINUED:         amoxicillin (AMOXIL) 500 MG capsule    (Z20.822) Person under investigation for COVID-19  (R05.9) Cough  Plan: Symptomatic; Unknown COVID-19 Virus         (Coronavirus) by PCR Nose, XR Chest 2 Views

## 2022-02-02 NOTE — PATIENT INSTRUCTIONS
Follow up immediately with severe headache, chest pain, or shortness of breath    Rest, isolate for 10 days, hydrate, follow up if worsening or new symptoms  Household members to isolate until test results, if positive isolate for 2 weeks and follow up for testing if symptoms occur         Patient Education     Coronavirus Disease 2019 (COVID-19): Caring for Yourself or Others   If you or a household member have symptoms of COVID-19, follow the guidelines below. This will help you manage symptoms and keep the virus from spreading.  If you have symptoms of COVID-19    Stay home and contact your care team. They will tell you what to do.    Don t panic. Keep in mind that other illnesses can cause similar symptoms.    Stay away from work, school, and public places.    Limit physical contact with others in your home. Limit visitors. No kissing.  Clean surfaces you touch with disinfectant.  If you need to cough or sneeze, do it into a tissue. Then throw the tissue into the trash. If you don't have tissues, cough or sneeze into the bend of your elbow.  Don t share food or personal items with people in your household. This includes items like eating and drinking utensils, towels, and bedding.  Wear a cloth face mask around other people. During a public health emergency, medical face masks may be reserved for healthcare workers. You may need to make a cloth face mask of your own. You can do this using a bandana, T-shirt, or other cloth. The CDC has instructions on how to make a face mask. Wear the mask so that it covers both your nose and mouth.  If you need to go to a hospital or clinic, call ahead to let them know. Expect the care team to wear masks, gowns, gloves, and eye protection. You may need to come to a different entrance or wait in a separate room.  Follow all instructions from your care team.    If you ve been diagnosed with COVID-19    Stay home and away from others, including other people in your home. (This is  called self-isolation.) Don t leave home unless you need to get medical care. Don t go to work, school, or public places. Don t use buses, taxis, or other public transportation.    Follow all instructions from your care team.    If you need to go to a hospital or clinic, call ahead to let them know. Expect the care team to wear masks, gowns, gloves, and eye protection. You may need to come to a different entrance or wait in a separate room.    Wear a face mask over your nose and mouth. This is to protect others from your germs. If you can t wear a mask, your caregivers should wear one. You may need to make your own mask using a bandana, T-shirt, or other cloth. See the CDC s instructions on how to make a face mask.    Have no contact with pets and other animals.    Don t share food or personal items with people in your household. This includes items like eating and drinking utensils, towels, and bedding.    If you need to cough or sneeze, do it into a tissue. Then throw the tissue into the trash. If you don't have tissues, cough or sneeze into the bend of your elbow.    Wash your hands often.    Self-care at home   At this time, there is no medicine approved to prevent or treat COVID-19. The FDA has approved an antiviral medicine (called remdesivir) for people being treated in the hospital. This is for people 12 years and older who weigh more than about 88 pounds (40 kgs). In certain cases, it may also be used for people younger than 12 years or who weigh less than about 88 pounds (40 kgs)..  Currently, treatment is mostly aimed at helping your body while it fights the virus.    Getting extra rest.    Drink extra fluids 6 to 8 glasses of liquids each day), unless a doctor has told you not to. Ask your care team which fluids are best for you. Avoid fluids that contain caffeine or alcohol.    Taking over-the-counter (OTC) medicine to reduce pain and fever. Your care team will tell you which medicine to use.  If you ve  been in the hospital for COVID-19, follow your care team s instructions. They will tell you when to stop self-isolation. They may also have you change positions to help your breathing (such as lying on your belly).  If a test showed that you have COVID-19, you may be asked to donate plasma after you ve recovered. (This is called COVID-19 convalescent plasma donation.) The plasma may contain antibodies to help fight the virus in others. Experts don't know if the plasma will work well as a treatment. Research continues, and the FDA has approved it for emergency use in certain people with serious or life-threatening COVID-19. For more information, talk to your care team.  Caring for a sick person     Follow all instructions from the care team.    Wash your hands often.    Wear protective clothing as advised.    Make sure the sick person wears a mask. If they can't wear a mask, don t stay in the same room with them. If you must be in the same room, wear a face mask. Make sure the mask covers both the nose and mouth.    Keep track of the sick person s symptoms.    Clean surfaces often with disinfectant. This includes phones, kitchen counters, fridge door handles, bathroom surfaces, and others.    Don t let anyone share household items with the sick person. This includes eating and drinking tools, towels, sheets, or blankets.    Clean fabrics and laundry well.    Keep other people and pets away from the sick person.    When you can stop self-isolation  When you are sick with COVID-19, you should stay away from other people. This is called self-isolation. The rules for ending self-isolation depend on your health in general.  If you are normally healthy:  You can stop self-isolation when all 3 of these are true:    You ve had no fever--and no medicine that reduces fever--for at least 24 hours. And     Your symptoms are better, such as cough or trouble breathing. And     At least 10 days have passed since your symptoms first  started.  Talk with your care team before you leave home. They may tell you it s okay to leave, or they may give you different advice. You do not need to be re-tested.  If you have a weak immune system, or you ve had severe COVID-19:  Follow your care team s instructions. You may be asked to self-isolate for 10 days to 20 days after your symptoms first started. Your care team may want to re-test you for COVID-19.  Note: If you re being treated for cancer, have an immune disorder (such as HIV), or have had a transplant (organ or bone marrow), you may have a weak immune system.  If you've already had COVID-19 once:  If you had the virus over 3 months ago, and you ve been exposed again, treat it like you've never had COVID-19. Stay home, limit your contact with others, call your care team, and watch for symptoms.  If it s been less than 3 months since you had the virus, you re symptom-free, and you ve been exposed again: You don t need to self-isolate or be re-tested. But if you develop new symptoms that can t be linked to another illness, please self-isolate and contact your care team.  When you return to public settings  When you are well enough to go outside your home, follow the CDC s guidance on cloth face masks.    Anyone over age 2 should wear a face mask in public, especially when it's hard to socially distance. This includes public transit, public protests and marches, and crowded stores, bars, and restaurants.    Face masks are most likely to reduce the spread of COVID-19 when they are widely used by people who are out in the public.  Certain people should not wear a face covering. These include:    Children under 2 years old    Anyone with a health, developmental, or mental health condition that can be made worse by wearing a mask    Anyone who is unconscious or unable to remove the face covering without help. See the CDC's guidance on who should not wear a face mask.  When to call your care team  Call your  care team right away if a sick person has any of these:    Trouble breathing    Pain or pressure in chest  If a sick person has any of these, call 911:    Trouble breathing that gets worse    Pain or pressure in chest that gets worse    Blue tint to lips or face    Fast or irregular heartbeat    Confusion or trouble waking    Fainting or loss of consciousness    Coughing up blood  Going home from the hospital   If you have COVID-19 and were recently in the hospital:    Follow the instructions above for self-care and isolation.    Follow the hospital care team s instructions.    Ask questions if anything is unclear to you. Write down answers so you remember them.  Date last modified: 11/23/2020  [employer] last reviewed this educational content on 4/1/2020  This information has been modified by your health care provider with permission from the publisher.    9287-5985 [employer]. [address] [place] [address]. All rights reserved. This information is not intended as a substitute for professional medical care. Always follow your healthcare professional's instructions.               Patient Education       When You Have Pneumonia  You have been diagnosed with pneumonia. This is a serious lung infection. Most cases of pneumonia are caused by bacteria. But it can also be caused by:       Viruses    Fungi    Atypical bacteria such as mycoplasma    Inhaling certain chemicals    Pneumonia most often occurs in older adults, young children, and people with chronic health problems.   Home care    Take your medicine exactly as directed. Don t skip doses. Take your antibiotics as directed until they are all gone, even if you start to feel better. This will prevent the pneumonia from coming back.    Drink plenty of water daily, unless directed otherwise. This may help to loosen and thin lung mucus so that you can cough it up.    Use a cool-mist humidifier in your bedroom. Clean the humidifier every  day.    Don t use medicines to suppress your cough unless your cough is dry, painful, or keeps you from sleep. Coughing up mucus is normal. It helps you recover. You may use an expectorant if your healthcare provider says it s OK.    You can use warm compresses or a heating pad on the lowest setting to relieve chest discomfort. Do this several times a day for short periods of time. To prevent injury to your skin, set the temperature to warm, not hot. Don t put the compress or pad directly on your skin. Make sure it has a cover or wrap it in a towel. This is to prevent skin burns.    Get plenty of rest until your fever, shortness of breath, and chest pain go away.    Plan to get a flu shot every year. The flu is a common cause of pneumonia. Getting a flu shot every year can help prevent both the flu and pneumonia.    Getting the pneumococcal vaccine  Talk with your healthcare provider about getting the pneumococcal vaccine. There are 2 kinds of pneumonia vaccines. You may need to get both. Pneumococcal pneumonia is caused by bacteria that spread from person to person. It can cause minor problems, such as ear infections. But it can also turn into these life-threatening illnesses:       Infection of the lungs (pneumonia)    Infection of the covering of the brain and spinal cord (meningitis)    Infection of the blood (bacteremia)    People at the highest risk of pneumococcal disease include:        Children under age 2    Adults over age 65    People with certain health conditions    Smokers    This vaccine can help prevent pneumococcal disease in both adults and children. Some people should not have the vaccine. Make sure to ask your healthcare provider if you should have the vaccine.    Follow-up care  Make a follow-up appointment as directed.   When to call your healthcare provider  Call your healthcare provider right away if you have any of these:     Fever of 100.4 F ( 38 C) or higher    Mucus from the lungs  (sputum) that s yellow, green, bloody, or smells bad    A large amount of sputum    Vomiting    Symptoms that get worse    New symptoms  Call 911  Call 911 right away if you have any of these:     Chest pain    Trouble breathing    Blue, purple, or gray lips or fingernails    Feeling of doom    Feeling faint or dizzy    Trouble talking  [employer]elina last reviewed this educational content on 11/1/2021 2000-2021 [employer]. All rights reserved. This information is not intended as a substitute for professional medical care. Always follow your healthcare professional's instructions.

## 2022-02-03 ENCOUNTER — TELEPHONE (OUTPATIENT)
Dept: PEDIATRICS | Facility: CLINIC | Age: 42
End: 2022-02-03

## 2022-02-03 LAB — SARS-COV-2 RNA RESP QL NAA+PROBE: NOT DETECTED

## 2022-02-04 NOTE — TELEPHONE ENCOUNTER
"Routing to Urgent Care pool.     The Pt and his wife called in wanting the Covid results. Result is negative but they are unable to see this on [facility]. Im showing the result as being \"released\", however I see a duplicate covid test showing as \"in process\".     Please advice. Is this duplicate covid test an error? Could this be preventing the resulted covid test from being [facility]?    Fidelia Fletcher RN   [facility]  -- Triage Nurse      "
Consulted per, Dr. Garcia, the COVID is negative. Called and left message requesting call back. Please review provider notes below.  Lo Chand MA    
LVM again asking patient to call back for message below    Sheree Cheatham CMA 2/4/2022 1:59 PM     
Please notify -     COVID result is still pending, anticipate that the result should be back later today or tomorrow  
No

## 2022-02-18 ENCOUNTER — E-VISIT (OUTPATIENT)
Dept: URGENT CARE | Facility: URGENT CARE | Age: 42
End: 2022-02-18

## 2022-02-18 ENCOUNTER — E-VISIT (OUTPATIENT)
Dept: URGENT CARE | Facility: URGENT CARE | Age: 42
End: 2022-02-18
Payer: COMMERCIAL

## 2022-02-18 DIAGNOSIS — K12.0 CANKER SORE: Primary | ICD-10-CM

## 2022-02-18 DIAGNOSIS — B00.1 HERPES LABIALIS: Primary | ICD-10-CM

## 2022-02-18 PROCEDURE — 99421 OL DIG E/M SVC 5-10 MIN: CPT | Performed by: PHYSICIAN ASSISTANT

## 2022-02-18 RX ORDER — VALACYCLOVIR HYDROCHLORIDE 1 G/1
2000 TABLET, FILM COATED ORAL 2 TIMES DAILY
Qty: 4 TABLET | Refills: 1 | Status: SHIPPED | OUTPATIENT
Start: 2022-02-18 | End: 2022-06-17

## 2022-02-18 RX ORDER — TRIAMCINOLONE ACETONIDE 0.1 %
PASTE (GRAM) DENTAL 2 TIMES DAILY
Qty: 5 G | Refills: 1 | Status: SHIPPED | OUTPATIENT
Start: 2022-02-18 | End: 2022-06-17

## 2022-05-15 ENCOUNTER — HEALTH MAINTENANCE LETTER (OUTPATIENT)
Age: 42
End: 2022-05-15

## 2022-06-16 SDOH — ECONOMIC STABILITY: TRANSPORTATION INSECURITY
IN THE PAST 12 MONTHS, HAS THE LACK OF TRANSPORTATION KEPT YOU FROM MEDICAL APPOINTMENTS OR FROM GETTING MEDICATIONS?: NO

## 2022-06-16 SDOH — HEALTH STABILITY: PHYSICAL HEALTH: ON AVERAGE, HOW MANY MINUTES DO YOU ENGAGE IN EXERCISE AT THIS LEVEL?: 30 MIN

## 2022-06-16 SDOH — ECONOMIC STABILITY: TRANSPORTATION INSECURITY
IN THE PAST 12 MONTHS, HAS LACK OF TRANSPORTATION KEPT YOU FROM MEETINGS, WORK, OR FROM GETTING THINGS NEEDED FOR DAILY LIVING?: NO

## 2022-06-16 SDOH — ECONOMIC STABILITY: INCOME INSECURITY: HOW HARD IS IT FOR YOU TO PAY FOR THE VERY BASICS LIKE FOOD, HOUSING, MEDICAL CARE, AND HEATING?: NOT VERY HARD

## 2022-06-16 SDOH — HEALTH STABILITY: PHYSICAL HEALTH: ON AVERAGE, HOW MANY DAYS PER WEEK DO YOU ENGAGE IN MODERATE TO STRENUOUS EXERCISE (LIKE A BRISK WALK)?: 3 DAYS

## 2022-06-16 SDOH — ECONOMIC STABILITY: FOOD INSECURITY: WITHIN THE PAST 12 MONTHS, YOU WORRIED THAT YOUR FOOD WOULD RUN OUT BEFORE YOU GOT MONEY TO BUY MORE.: NEVER TRUE

## 2022-06-16 SDOH — ECONOMIC STABILITY: INCOME INSECURITY: IN THE LAST 12 MONTHS, WAS THERE A TIME WHEN YOU WERE NOT ABLE TO PAY THE MORTGAGE OR RENT ON TIME?: NO

## 2022-06-16 SDOH — ECONOMIC STABILITY: FOOD INSECURITY: WITHIN THE PAST 12 MONTHS, THE FOOD YOU BOUGHT JUST DIDN'T LAST AND YOU DIDN'T HAVE MONEY TO GET MORE.: NEVER TRUE

## 2022-06-16 ASSESSMENT — LIFESTYLE VARIABLES
HOW OFTEN DO YOU HAVE SIX OR MORE DRINKS ON ONE OCCASION: NEVER
HOW OFTEN DO YOU HAVE A DRINK CONTAINING ALCOHOL: 2-4 TIMES A MONTH
HOW MANY STANDARD DRINKS CONTAINING ALCOHOL DO YOU HAVE ON A TYPICAL DAY: 1 OR 2
AUDIT-C TOTAL SCORE: 2
SKIP TO QUESTIONS 9-10: 1

## 2022-06-16 ASSESSMENT — ENCOUNTER SYMPTOMS
ABDOMINAL PAIN: 0
COUGH: 0
MYALGIAS: 0
CHILLS: 0
EYE PAIN: 0
HEMATOCHEZIA: 0
ARTHRALGIAS: 0
HEMATURIA: 0
DIARRHEA: 0
SHORTNESS OF BREATH: 0
FEVER: 0
PARESTHESIAS: 0
PALPITATIONS: 0
HEARTBURN: 0
NERVOUS/ANXIOUS: 0
SORE THROAT: 0
DYSURIA: 0
FREQUENCY: 0
JOINT SWELLING: 0
HEADACHES: 0
DIZZINESS: 0
WEAKNESS: 0
NAUSEA: 0
CONSTIPATION: 0

## 2022-06-16 ASSESSMENT — SOCIAL DETERMINANTS OF HEALTH (SDOH)
HOW OFTEN DO YOU GET TOGETHER WITH FRIENDS OR RELATIVES?: ONCE A WEEK
DO YOU BELONG TO ANY CLUBS OR ORGANIZATIONS SUCH AS CHURCH GROUPS UNIONS, FRATERNAL OR ATHLETIC GROUPS, OR SCHOOL GROUPS?: NO
IN A TYPICAL WEEK, HOW MANY TIMES DO YOU TALK ON THE PHONE WITH FAMILY, FRIENDS, OR NEIGHBORS?: THREE TIMES A WEEK
HOW OFTEN DO YOU ATTEND CHURCH OR RELIGIOUS SERVICES?: NEVER

## 2022-06-17 ENCOUNTER — OFFICE VISIT (OUTPATIENT)
Dept: FAMILY MEDICINE | Facility: CLINIC | Age: 42
End: 2022-06-17
Payer: COMMERCIAL

## 2022-06-17 VITALS
HEART RATE: 75 BPM | DIASTOLIC BLOOD PRESSURE: 77 MMHG | RESPIRATION RATE: 16 BRPM | OXYGEN SATURATION: 99 % | HEIGHT: 77 IN | TEMPERATURE: 97.5 F | WEIGHT: 193 LBS | BODY MASS INDEX: 22.79 KG/M2 | SYSTOLIC BLOOD PRESSURE: 126 MMHG

## 2022-06-17 DIAGNOSIS — Z00.00 ROUTINE GENERAL MEDICAL EXAMINATION AT A HEALTH CARE FACILITY: Primary | ICD-10-CM

## 2022-06-17 DIAGNOSIS — Z86.73 HISTORY OF CVA (CEREBROVASCULAR ACCIDENT): ICD-10-CM

## 2022-06-17 DIAGNOSIS — Z13.220 SCREENING FOR HYPERLIPIDEMIA: ICD-10-CM

## 2022-06-17 LAB
ALBUMIN SERPL-MCNC: 3.7 G/DL (ref 3.4–5)
ALP SERPL-CCNC: 66 U/L (ref 40–150)
ALT SERPL W P-5'-P-CCNC: 21 U/L (ref 0–70)
ANION GAP SERPL CALCULATED.3IONS-SCNC: 2 MMOL/L (ref 3–14)
AST SERPL W P-5'-P-CCNC: 18 U/L (ref 0–45)
BASOPHILS # BLD AUTO: 0 10E3/UL (ref 0–0.2)
BASOPHILS NFR BLD AUTO: 0 %
BILIRUB SERPL-MCNC: 0.6 MG/DL (ref 0.2–1.3)
BUN SERPL-MCNC: 14 MG/DL (ref 7–30)
CALCIUM SERPL-MCNC: 8.8 MG/DL (ref 8.5–10.1)
CHLORIDE BLD-SCNC: 111 MMOL/L (ref 94–109)
CHOLEST SERPL-MCNC: 223 MG/DL
CO2 SERPL-SCNC: 29 MMOL/L (ref 20–32)
CREAT SERPL-MCNC: 0.95 MG/DL (ref 0.66–1.25)
EOSINOPHIL # BLD AUTO: 0.2 10E3/UL (ref 0–0.7)
EOSINOPHIL NFR BLD AUTO: 3 %
ERYTHROCYTE [DISTWIDTH] IN BLOOD BY AUTOMATED COUNT: 12.8 % (ref 10–15)
FASTING STATUS PATIENT QL REPORTED: NO
GFR SERPL CREATININE-BSD FRML MDRD: >90 ML/MIN/1.73M2
GLUCOSE BLD-MCNC: 76 MG/DL (ref 70–99)
HCT VFR BLD AUTO: 42.1 % (ref 40–53)
HDLC SERPL-MCNC: 47 MG/DL
HGB BLD-MCNC: 14.6 G/DL (ref 13.3–17.7)
LDLC SERPL CALC-MCNC: 142 MG/DL
LYMPHOCYTES # BLD AUTO: 1.7 10E3/UL (ref 0.8–5.3)
LYMPHOCYTES NFR BLD AUTO: 34 %
MCH RBC QN AUTO: 30.9 PG (ref 26.5–33)
MCHC RBC AUTO-ENTMCNC: 34.7 G/DL (ref 31.5–36.5)
MCV RBC AUTO: 89 FL (ref 78–100)
MONOCYTES # BLD AUTO: 0.5 10E3/UL (ref 0–1.3)
MONOCYTES NFR BLD AUTO: 10 %
NEUTROPHILS # BLD AUTO: 2.8 10E3/UL (ref 1.6–8.3)
NEUTROPHILS NFR BLD AUTO: 53 %
NONHDLC SERPL-MCNC: 176 MG/DL
PLATELET # BLD AUTO: 180 10E3/UL (ref 150–450)
POTASSIUM BLD-SCNC: 4 MMOL/L (ref 3.4–5.3)
PROT SERPL-MCNC: 7 G/DL (ref 6.8–8.8)
RBC # BLD AUTO: 4.73 10E6/UL (ref 4.4–5.9)
SODIUM SERPL-SCNC: 142 MMOL/L (ref 133–144)
TRIGL SERPL-MCNC: 170 MG/DL
WBC # BLD AUTO: 5.2 10E3/UL (ref 4–11)

## 2022-06-17 PROCEDURE — 99396 PREV VISIT EST AGE 40-64: CPT | Performed by: FAMILY MEDICINE

## 2022-06-17 PROCEDURE — 36415 COLL VENOUS BLD VENIPUNCTURE: CPT | Performed by: FAMILY MEDICINE

## 2022-06-17 PROCEDURE — 80053 COMPREHEN METABOLIC PANEL: CPT | Performed by: FAMILY MEDICINE

## 2022-06-17 PROCEDURE — 85025 COMPLETE CBC W/AUTO DIFF WBC: CPT | Performed by: FAMILY MEDICINE

## 2022-06-17 PROCEDURE — 80061 LIPID PANEL: CPT | Performed by: FAMILY MEDICINE

## 2022-06-17 ASSESSMENT — ENCOUNTER SYMPTOMS
PARESTHESIAS: 0
HEARTBURN: 0
MYALGIAS: 0
SHORTNESS OF BREATH: 0
HEADACHES: 0
WEAKNESS: 0
HEMATURIA: 0
CHILLS: 0
DYSURIA: 0
ARTHRALGIAS: 0
DIZZINESS: 0
NAUSEA: 0
PALPITATIONS: 0
SORE THROAT: 0
JOINT SWELLING: 0
FREQUENCY: 0
EYE PAIN: 0
HEMATOCHEZIA: 0
ABDOMINAL PAIN: 0
DIARRHEA: 0
CONSTIPATION: 0
FEVER: 0
NERVOUS/ANXIOUS: 0
COUGH: 0

## 2022-06-17 NOTE — PROGRESS NOTES
SUBJECTIVE:   CC: Serjio Farris is an 41 year old male who presents for preventative health visit.       Patient has been advised of split billing requirements and indicates understanding: Yes  Healthy Habits:     Getting at least 3 servings of Calcium per day:  Yes    Bi-annual eye exam:  Yes    Dental care twice a year:  Yes    Sleep apnea or symptoms of sleep apnea:  None    Diet:  Regular (no restrictions) and Other    Frequency of exercise:  2-3 days/week    Duration of exercise:  15-30 minutes    Taking medications regularly:  Yes    Medication side effects:  None    PHQ-2 Total Score: 2    Additional concerns today:  No    Embarking on adoption journey and needs a physical for that. No acute complaints today.       Today's PHQ-2 Score:   PHQ-2 (  [employer]) 2022   Q1: Little interest or pleasure in doing things 1   Q2: Feeling down, depressed or hopeless 1   PHQ-2 Score 2   Q1: Little interest or pleasure in doing things Several days   Q2: Feeling down, depressed or hopeless Several days   PHQ-2 Score 2       Abuse: Current or Past(Physical, Sexual or Emotional)- No  Do you feel safe in your environment? Yes        Social History     Tobacco Use     Smoking status: Former Smoker     Packs/day: 0.10     Years: 10.00     Pack years: 1.00     Types: Cigarettes, Cigarettes     Start date: 1999     Quit date: 2014     Years since quittin.7     Smokeless tobacco: Never Used     Tobacco comment: 3 cigs day 13 years   Substance Use Topics     Alcohol use: Yes     Comment: Extremely rarely (major occassasions) bad affects on stomach     If you drink alcohol do you typically have >3 drinks per day or >7 drinks per week? No    Alcohol Use 2022   Prescreen: >3 drinks/day or >7 drinks/week? No   No flowsheet data found.    Last PSA: No results found for: PSA    Reviewed orders with patient. Reviewed health maintenance and updated orders accordingly - Yes  Labs reviewed in EPIC    Reviewed and  "updated as needed this visit by clinical staff   Tobacco  Allergies    Med Hx  Surg Hx  Fam Hx  Soc Hx          Reviewed and updated as needed this visit by Provider                       Review of Systems   Constitutional: Negative for chills and fever.   HENT: Negative for congestion, ear pain, hearing loss and sore throat.    Eyes: Negative for pain and visual disturbance.   Respiratory: Negative for cough and shortness of breath.    Cardiovascular: Negative for chest pain and palpitations.   Gastrointestinal: Negative for abdominal pain, constipation, diarrhea, heartburn, hematochezia and nausea.   Genitourinary: Negative for dysuria, frequency, genital sores, hematuria, impotence, penile discharge and urgency.   Musculoskeletal: Negative for arthralgias, joint swelling and myalgias.   Skin: Negative for rash.   Neurological: Negative for dizziness, weakness, headaches and paresthesias.   Psychiatric/Behavioral: Negative for mood changes. The patient is not nervous/anxious.        OBJECTIVE:   /77 (BP Location: Right arm, Patient Position: Chair, Cuff Size: Adult Regular)   Pulse 75   Temp 97.5  F (36.4  C) (Oral)   Resp 16   Ht 1.956 m (6' 5\")   Wt 87.5 kg (193 lb)   SpO2 99%   BMI 22.89 kg/m      Physical Exam  GENERAL: healthy, alert and no distress  EYES: Eyes grossly normal to inspection, PERRL and conjunctivae and sclerae normal  HENT: ear canals and TM's normal, nose and mouth without ulcers or lesions  NECK: no adenopathy, no asymmetry, masses, or scars and thyroid normal to palpation  RESP: lungs clear to auscultation - no rales, rhonchi or wheezes  CV: regular rate and rhythm, normal S1 S2, no S3 or S4, no murmur, click or rub, no peripheral edema and peripheral pulses strong  ABDOMEN: soft, nontender, no hepatosplenomegaly, no masses and bowel sounds normal  MS: no gross musculoskeletal defects noted, no edema  SKIN: no suspicious lesions or rashes  NEURO: Normal strength and tone, " "mentation intact and speech normal  PSYCH: mentation appears normal, affect normal/bright    Diagnostic Test Results:  Labs reviewed in Epic  none     ASSESSMENT/PLAN:   (Z00.00) Routine general medical examination at a health care facility  (primary encounter diagnosis)  Plan: Comprehensive metabolic panel (BMP + Alb, Alk         Phos, ALT, AST, Total. Bili, TP), Lipid panel         reflex to direct LDL Non-fasting, CBC with         platelets and differential      (Z13.220) Screening for hyperlipidemia  Plan: Lipid panel reflex to direct LDL Non-fasting      (Z86.73) History of CVA (cerebrovascular accident)  Comment: patient doing well since CVA in 2002      Patient has been advised of split billing requirements and indicates understanding: Yes    COUNSELING:   Reviewed preventive health counseling, as reflected in patient instructions       Regular exercise       Healthy diet/nutrition    Estimated body mass index is 22.89 kg/m  as calculated from the following:    Height as of this encounter: 1.956 m (6' 5\").    Weight as of this encounter: 87.5 kg (193 lb).         He reports that he quit smoking about 7 years ago. His smoking use included cigarettes and cigarettes. He started smoking about 23 years ago. He has a 1.00 pack-year smoking history. He has never used smokeless tobacco.      Counseling Resources:  ATP IV Guidelines  Pooled Cohorts Equation Calculator  FRAX Risk Assessment  ICSI Preventive Guidelines  Dietary Guidelines for Americans, 2010  USDA's MyPlate  ASA Prophylaxis  Lung CA Screening    Taylor Walsh MD  [facility]"

## 2022-07-12 ENCOUNTER — E-VISIT (OUTPATIENT)
Dept: FAMILY MEDICINE | Facility: CLINIC | Age: 42
End: 2022-07-12

## 2022-07-12 DIAGNOSIS — Z53.9 ERRONEOUS ENCOUNTER--DISREGARD: Primary | ICD-10-CM

## 2022-07-12 ASSESSMENT — PATIENT HEALTH QUESTIONNAIRE - PHQ9
SUM OF ALL RESPONSES TO PHQ QUESTIONS 1-9: 18
SUM OF ALL RESPONSES TO PHQ QUESTIONS 1-9: 18
10. IF YOU CHECKED OFF ANY PROBLEMS, HOW DIFFICULT HAVE THESE PROBLEMS MADE IT FOR YOU TO DO YOUR WORK, TAKE CARE OF THINGS AT HOME, OR GET ALONG WITH OTHER PEOPLE: SOMEWHAT DIFFICULT

## 2022-07-12 ASSESSMENT — ANXIETY QUESTIONNAIRES
5. BEING SO RESTLESS THAT IT IS HARD TO SIT STILL: SEVERAL DAYS
7. FEELING AFRAID AS IF SOMETHING AWFUL MIGHT HAPPEN: SEVERAL DAYS
6. BECOMING EASILY ANNOYED OR IRRITABLE: MORE THAN HALF THE DAYS
GAD7 TOTAL SCORE: 15
3. WORRYING TOO MUCH ABOUT DIFFERENT THINGS: NEARLY EVERY DAY
7. FEELING AFRAID AS IF SOMETHING AWFUL MIGHT HAPPEN: SEVERAL DAYS
1. FEELING NERVOUS, ANXIOUS, OR ON EDGE: MORE THAN HALF THE DAYS
2. NOT BEING ABLE TO STOP OR CONTROL WORRYING: NEARLY EVERY DAY
GAD7 TOTAL SCORE: 15
GAD7 TOTAL SCORE: 15
8. IF YOU CHECKED OFF ANY PROBLEMS, HOW DIFFICULT HAVE THESE MADE IT FOR YOU TO DO YOUR WORK, TAKE CARE OF THINGS AT HOME, OR GET ALONG WITH OTHER PEOPLE?: VERY DIFFICULT
4. TROUBLE RELAXING: NEARLY EVERY DAY

## 2022-07-13 ASSESSMENT — ANXIETY QUESTIONNAIRES: GAD7 TOTAL SCORE: 15

## 2022-07-13 ASSESSMENT — PATIENT HEALTH QUESTIONNAIRE - PHQ9: SUM OF ALL RESPONSES TO PHQ QUESTIONS 1-9: 18

## 2022-07-13 NOTE — PROGRESS NOTES
Patient reporting anxiety and depression. I don't see that he is on medication. Will need at least a virtual visit to discuss and figure out best medication options for him.   I'm happy to see him virtually next Tuesday    NWFLORENCIO

## 2022-07-14 ENCOUNTER — TELEPHONE (OUTPATIENT)
Dept: FAMILY MEDICINE | Facility: CLINIC | Age: 42
End: 2022-07-14

## 2022-07-14 NOTE — TELEPHONE ENCOUNTER
left message for call back, routed to triage  Mary Jane Vazquez RN      Patient reporting anxiety and depression. I don't see that he is on medication. Will need at least a virtual visit to discuss and figure out best medication options for him.   I'm happy to see him virtually next Tuesday     NWD    Mary Jane Vazquez RN, BSN  [facility]

## 2022-07-14 NOTE — TELEPHONE ENCOUNTER
Pt calls, informed, agrees, virtual visit scheduled  Mary Jane Vazquez RN, BSN  [facility]

## 2022-07-19 ENCOUNTER — VIRTUAL VISIT (OUTPATIENT)
Dept: FAMILY MEDICINE | Facility: CLINIC | Age: 42
End: 2022-07-19
Payer: COMMERCIAL

## 2022-07-19 DIAGNOSIS — F41.1 GENERALIZED ANXIETY DISORDER: ICD-10-CM

## 2022-07-19 DIAGNOSIS — F33.1 MODERATE EPISODE OF RECURRENT MAJOR DEPRESSIVE DISORDER (H): Primary | ICD-10-CM

## 2022-07-19 PROCEDURE — 99214 OFFICE O/P EST MOD 30 MIN: CPT | Mod: 95 | Performed by: FAMILY MEDICINE

## 2022-07-19 NOTE — PROGRESS NOTES
Serjio is a 42 year old who is being evaluated via a billable video visit.      How would you like to obtain your AVS? MyChart  If the video visit is dropped, the invitation should be resent by: Text to cell phone: [phone]   Will anyone else be joining your video visit? No          Assessment & Plan     Moderate episode of recurrent major depressive disorder (H)  - reviewed current symptoms and treatment options. Will start on zoloft for now. Also advised to follow up with therapist. Medication use and side effects discussed with the patient. Patient is in complete understanding and agreement with plan.   - sertraline (ZOLOFT) 50 MG tablet; Take 0.5 tablets (25 mg) by mouth daily X 1 week then increase to 1 tablet daily    Generalized anxiety disorder  - needs improvement. Trial of zoloft today. Recheck in 5-6 weeks.   - sertraline (ZOLOFT) 50 MG tablet; Take 0.5 tablets (25 mg) by mouth daily X 1 week then increase to 1 tablet daily     30 minutes spent on the date of the encounter doing chart review, history and exam, documentation and further activities per the note       See Patient Instructions    Return in about 5 weeks (around 8/23/2022) for medication recheck, virtual, - anxiety/depression.   Follow-up Visit   Expected date: Aug 23, 2022      Follow Up Appointment Details:     Follow-up with whom?: Me    Follow-Up for what?: Other (Office Visit)    Additional Details: anxiety/depression    How?: Video                    Taylor Walsh MD  [facility]    Dameon Bassett is a 42 year old, presenting for the following health issues:  Depression      History of Present Illness       Reason for visit:  Ongoing depression    He eats 2-3 servings of fruits and vegetables daily.He consumes 1 sweetened beverage(s) daily.He exercises with enough effort to increase his heart rate 10 to 19 minutes per day.  He exercises with enough effort to increase his  "heart rate 3 or less days per week.   He is taking medications regularly.       Abnormal Mood Symptoms  Onset/Duration: on/off years  Description:   Depression (if yes, do PHQ-9): YES  Anxiety (if yes, do WILMAR-7): YES  Accompanying Signs & Symptoms:  Still participating in activities that you used to enjoy: No  Fatigue: YES  Irritability: YES  Difficulty concentrating: YES  Changes in appetite: No  Problems with sleep: YES- unable to stay asleep  Heart racing/beating fast: No  Abnormally elevated, expansive, or irritable mood: YES  Persistently increased activity or energy: No  Thoughts of hurting yourself or others: No  History:  Recent stress or major life event: YES- recently found out unable to have children and missing family back in [facility].  Prior depression or anxiety: yes  Family history of depression or anxiety: YES- both parents suffer from depression  Alcohol/drug use: No  Difficulty sleeping: YES  Precipitating or alleviating factors: None  Therapies tried and outcome: Counseling when they found out they could not have children  for 3-4 months  PHQ 7/12/2022   PHQ-9 Total Score 18   Q9: Thoughts of better off dead/self-harm past 2 weeks Not at all     WILMAR-7 SCORE 7/12/2022   Total Score 15 (severe anxiety)   Total Score 15     Per patient he tried prozac in the past but it made him feel like a zombie. He is currently feeling like in a roller coaster due to current infertility issues.     Review of Systems   Constitutional, HEENT, cardiovascular, pulmonary, GI, , musculoskeletal, neuro, skin, endocrine and psych systems are negative, except as otherwise noted.      Objective    Vitals - Patient Reported  Weight (Patient Reported): 88.5 kg (195 lb)  Height (Patient Reported): 193 cm (6' 4\")  BMI (Based on Pt Reported Ht/Wt): 23.74      Vitals:  No vitals were obtained today due to virtual visit.    Physical Exam   GENERAL: Healthy, alert and no distress  EYES: Eyes grossly normal to inspection.  " No discharge or erythema, or obvious scleral/conjunctival abnormalities.  RESP: No audible wheeze, cough, or visible cyanosis.  No visible retractions or increased work of breathing.    PSYCH: Mentation appears normal, affect normal/bright, judgement and insight intact, normal speech and appearance well-groomed.            Video-Visit Details    Video Start Time: 2:19 PM    Type of service:  Video Visit    Video End Time:2:33 PM    Originating Location (pt. Location): Home    Distant Location (provider location):  [facility] [place]     Platform used for Video Visit: [email or web address]    Yamilet Woodard.

## 2022-08-24 ENCOUNTER — MEDICAL CORRESPONDENCE (OUTPATIENT)
Dept: HEALTH INFORMATION MANAGEMENT | Facility: CLINIC | Age: 42
End: 2022-08-24

## 2022-08-24 ENCOUNTER — TRANSFERRED RECORDS (OUTPATIENT)
Dept: HEALTH INFORMATION MANAGEMENT | Facility: CLINIC | Age: 42
End: 2022-08-24

## 2022-08-25 ENCOUNTER — TELEPHONE (OUTPATIENT)
Dept: RHEUMATOLOGY | Facility: CLINIC | Age: 42
End: 2022-08-25

## 2022-08-25 NOTE — TELEPHONE ENCOUNTER
[facility] Call Center    Phone Message    May a detailed message be left on voicemail: yes     Reason for Call: Appointment Intake    Referring Provider Name: Reji Fuentes  Diagnosis and/or Symptoms: Arteritis, unspecified      Pt requesting to be seen at [facility]. Please review chart and advise on who to schedule with. Referral is requesting pt been seen within 1-2 weeks.     Action Taken: Other: [facility]    Travel Screening: Not Applicable

## 2022-08-28 ASSESSMENT — PATIENT HEALTH QUESTIONNAIRE - PHQ9
10. IF YOU CHECKED OFF ANY PROBLEMS, HOW DIFFICULT HAVE THESE PROBLEMS MADE IT FOR YOU TO DO YOUR WORK, TAKE CARE OF THINGS AT HOME, OR GET ALONG WITH OTHER PEOPLE: SOMEWHAT DIFFICULT
SUM OF ALL RESPONSES TO PHQ QUESTIONS 1-9: 12
SUM OF ALL RESPONSES TO PHQ QUESTIONS 1-9: 12

## 2022-08-28 ASSESSMENT — ANXIETY QUESTIONNAIRES
1. FEELING NERVOUS, ANXIOUS, OR ON EDGE: SEVERAL DAYS
IF YOU CHECKED OFF ANY PROBLEMS ON THIS QUESTIONNAIRE, HOW DIFFICULT HAVE THESE PROBLEMS MADE IT FOR YOU TO DO YOUR WORK, TAKE CARE OF THINGS AT HOME, OR GET ALONG WITH OTHER PEOPLE: SOMEWHAT DIFFICULT
4. TROUBLE RELAXING: MORE THAN HALF THE DAYS
GAD7 TOTAL SCORE: 11
2. NOT BEING ABLE TO STOP OR CONTROL WORRYING: SEVERAL DAYS
GAD7 TOTAL SCORE: 11
7. FEELING AFRAID AS IF SOMETHING AWFUL MIGHT HAPPEN: SEVERAL DAYS
8. IF YOU CHECKED OFF ANY PROBLEMS, HOW DIFFICULT HAVE THESE MADE IT FOR YOU TO DO YOUR WORK, TAKE CARE OF THINGS AT HOME, OR GET ALONG WITH OTHER PEOPLE?: SOMEWHAT DIFFICULT
5. BEING SO RESTLESS THAT IT IS HARD TO SIT STILL: MORE THAN HALF THE DAYS
6. BECOMING EASILY ANNOYED OR IRRITABLE: NEARLY EVERY DAY
3. WORRYING TOO MUCH ABOUT DIFFERENT THINGS: SEVERAL DAYS
7. FEELING AFRAID AS IF SOMETHING AWFUL MIGHT HAPPEN: SEVERAL DAYS

## 2022-08-29 ENCOUNTER — VIRTUAL VISIT (OUTPATIENT)
Dept: FAMILY MEDICINE | Facility: CLINIC | Age: 42
End: 2022-08-29
Attending: FAMILY MEDICINE
Payer: COMMERCIAL

## 2022-08-29 ENCOUNTER — MYC REFILL (OUTPATIENT)
Dept: FAMILY MEDICINE | Facility: CLINIC | Age: 42
End: 2022-08-29

## 2022-08-29 DIAGNOSIS — F33.1 MODERATE EPISODE OF RECURRENT MAJOR DEPRESSIVE DISORDER (H): ICD-10-CM

## 2022-08-29 DIAGNOSIS — F41.1 GENERALIZED ANXIETY DISORDER: ICD-10-CM

## 2022-08-29 PROCEDURE — 99213 OFFICE O/P EST LOW 20 MIN: CPT | Mod: 95 | Performed by: FAMILY MEDICINE

## 2022-08-29 ASSESSMENT — PATIENT HEALTH QUESTIONNAIRE - PHQ9
10. IF YOU CHECKED OFF ANY PROBLEMS, HOW DIFFICULT HAVE THESE PROBLEMS MADE IT FOR YOU TO DO YOUR WORK, TAKE CARE OF THINGS AT HOME, OR GET ALONG WITH OTHER PEOPLE: SOMEWHAT DIFFICULT
SUM OF ALL RESPONSES TO PHQ QUESTIONS 1-9: 12

## 2022-08-29 ASSESSMENT — ANXIETY QUESTIONNAIRES: GAD7 TOTAL SCORE: 11

## 2022-08-29 ASSESSMENT — ENCOUNTER SYMPTOMS: NERVOUS/ANXIOUS: 1

## 2022-08-29 NOTE — TELEPHONE ENCOUNTER
Spoke with pt and wife, they have not heard anything back and wondering when they can schedule at the [facility].

## 2022-08-29 NOTE — TELEPHONE ENCOUNTER
Discussed with Dr. Vann.  Can offer pt an appt on 9/14 at 1 pm.    Melida Best RN  Rheumatology Clinic

## 2022-08-30 NOTE — TELEPHONE ENCOUNTER
NOTES Status Details   OFFICE NOTE from referring provider     OFFICE NOTE from other specialist     DISCHARGE SUMMARY from hospital     DISCHARGE REPORT from the ER Care Everywhere 08.24.2022 Reji Fuentes, [facility]     MEDICATION LIST Internal    LABS (Any and all labs)      CE /Internal    Biopsy/pathology (Anything related to diagnoses I.e. fluid aspirations, lip biopsy, muscle biopsy)               Imaging (All imaging related to diagnoses)     Echo     HRCT     CXR     EMG                    Scleroderma/Dermatomyositis diagnoses     Previous Cardiology notes      Previous Pulmonary notes     Previous Dermatology notes     Previous GI notes     Lupus diagnoses     Previous Nephrology notes     Previous Dermatology notes     Previous Cardiology notes

## 2022-09-07 ENCOUNTER — MYC REFILL (OUTPATIENT)
Dept: FAMILY MEDICINE | Facility: CLINIC | Age: 42
End: 2022-09-07

## 2022-09-07 DIAGNOSIS — F33.1 MODERATE EPISODE OF RECURRENT MAJOR DEPRESSIVE DISORDER (H): ICD-10-CM

## 2022-09-07 DIAGNOSIS — F41.1 GENERALIZED ANXIETY DISORDER: ICD-10-CM

## 2022-09-09 NOTE — TELEPHONE ENCOUNTER
Spoke to pharmacy staff at [facility], patient picked up Sertraline rx yesterday. Will refuse this request.     Left message for patient to call back and ask to speak to a triage nurse if there are any questions in regards to this refill.     Patricia BURGER RN, BSN, PHN  [facility]

## 2022-09-11 ENCOUNTER — HEALTH MAINTENANCE LETTER (OUTPATIENT)
Age: 42
End: 2022-09-11

## 2022-09-12 DIAGNOSIS — F33.1 MODERATE EPISODE OF RECURRENT MAJOR DEPRESSIVE DISORDER (H): ICD-10-CM

## 2022-09-12 DIAGNOSIS — F41.1 GENERALIZED ANXIETY DISORDER: ICD-10-CM

## 2022-09-12 NOTE — TELEPHONE ENCOUNTER
Pt was given 3 months of medication, should not need refill yet.     90 tablet 0 8/29/2022     Asya Quarles, RN, BSN, PHN  [facility]

## 2022-09-14 ENCOUNTER — LAB (OUTPATIENT)
Dept: LAB | Facility: CLINIC | Age: 42
End: 2022-09-14
Payer: COMMERCIAL

## 2022-09-14 ENCOUNTER — OFFICE VISIT (OUTPATIENT)
Dept: RHEUMATOLOGY | Facility: CLINIC | Age: 42
End: 2022-09-14
Attending: INTERNAL MEDICINE
Payer: COMMERCIAL

## 2022-09-14 ENCOUNTER — PRE VISIT (OUTPATIENT)
Dept: RHEUMATOLOGY | Facility: CLINIC | Age: 42
End: 2022-09-14

## 2022-09-14 VITALS
HEART RATE: 73 BPM | HEIGHT: 76 IN | WEIGHT: 194 LBS | DIASTOLIC BLOOD PRESSURE: 78 MMHG | SYSTOLIC BLOOD PRESSURE: 132 MMHG | BODY MASS INDEX: 23.62 KG/M2

## 2022-09-14 DIAGNOSIS — Z11.59 ENCOUNTER FOR SCREENING FOR OTHER VIRAL DISEASES: ICD-10-CM

## 2022-09-14 DIAGNOSIS — I77.6 AORTITIS (H): Primary | ICD-10-CM

## 2022-09-14 DIAGNOSIS — I77.6 AORTITIS (H): ICD-10-CM

## 2022-09-14 DIAGNOSIS — M35.2 BEHCET'S DISEASE (H): ICD-10-CM

## 2022-09-14 DIAGNOSIS — M46.1 SACROILIITIS (H): ICD-10-CM

## 2022-09-14 LAB
ALBUMIN SERPL BCG-MCNC: 4.7 G/DL (ref 3.5–5.2)
ALBUMIN UR-MCNC: NEGATIVE MG/DL
ALP SERPL-CCNC: 73 U/L (ref 40–129)
ALT SERPL W P-5'-P-CCNC: 22 U/L (ref 10–50)
ANION GAP SERPL CALCULATED.3IONS-SCNC: 14 MMOL/L (ref 7–15)
APPEARANCE UR: CLEAR
AST SERPL W P-5'-P-CCNC: 17 U/L (ref 10–50)
BASOPHILS # BLD AUTO: 0 10E3/UL (ref 0–0.2)
BASOPHILS NFR BLD AUTO: 0 %
BILIRUB SERPL-MCNC: 0.6 MG/DL
BILIRUB UR QL STRIP: NEGATIVE
BUN SERPL-MCNC: 19.3 MG/DL (ref 6–20)
CALCIUM SERPL-MCNC: 10 MG/DL (ref 8.6–10)
CHLORIDE SERPL-SCNC: 100 MMOL/L (ref 98–107)
COLOR UR AUTO: YELLOW
CREAT SERPL-MCNC: 0.94 MG/DL (ref 0.67–1.17)
CRP SERPL-MCNC: <3 MG/L
DEPRECATED HCO3 PLAS-SCNC: 25 MMOL/L (ref 22–29)
EOSINOPHIL # BLD AUTO: 0 10E3/UL (ref 0–0.7)
EOSINOPHIL NFR BLD AUTO: 0 %
ERYTHROCYTE [DISTWIDTH] IN BLOOD BY AUTOMATED COUNT: 12.2 % (ref 10–15)
ERYTHROCYTE [SEDIMENTATION RATE] IN BLOOD BY WESTERGREN METHOD: 7 MM/HR (ref 0–15)
GFR SERPL CREATININE-BSD FRML MDRD: >90 ML/MIN/1.73M2
GLUCOSE SERPL-MCNC: 97 MG/DL (ref 70–99)
GLUCOSE UR STRIP-MCNC: NEGATIVE MG/DL
HCT VFR BLD AUTO: 47.3 % (ref 40–53)
HGB BLD-MCNC: 15.8 G/DL (ref 13.3–17.7)
HGB UR QL STRIP: NEGATIVE
IMM GRANULOCYTES # BLD: 0.1 10E3/UL
IMM GRANULOCYTES NFR BLD: 1 %
KETONES UR STRIP-MCNC: NEGATIVE MG/DL
LEUKOCYTE ESTERASE UR QL STRIP: NEGATIVE
LYMPHOCYTES # BLD AUTO: 1.1 10E3/UL (ref 0.8–5.3)
LYMPHOCYTES NFR BLD AUTO: 10 %
MCH RBC QN AUTO: 30 PG (ref 26.5–33)
MCHC RBC AUTO-ENTMCNC: 33.4 G/DL (ref 31.5–36.5)
MCV RBC AUTO: 90 FL (ref 78–100)
MONOCYTES # BLD AUTO: 0.2 10E3/UL (ref 0–1.3)
MONOCYTES NFR BLD AUTO: 2 %
NEUTROPHILS # BLD AUTO: 8.9 10E3/UL (ref 1.6–8.3)
NEUTROPHILS NFR BLD AUTO: 87 %
NITRATE UR QL: NEGATIVE
NRBC # BLD AUTO: 0 10E3/UL
NRBC BLD AUTO-RTO: 0 /100
PH UR STRIP: 6.5 [PH] (ref 5–7)
PLATELET # BLD AUTO: 212 10E3/UL (ref 150–450)
POTASSIUM SERPL-SCNC: 4.1 MMOL/L (ref 3.4–5.3)
PROT SERPL-MCNC: 7.9 G/DL (ref 6.4–8.3)
RBC # BLD AUTO: 5.26 10E6/UL (ref 4.4–5.9)
RBC URINE: 1 /HPF
SODIUM SERPL-SCNC: 139 MMOL/L (ref 136–145)
SP GR UR STRIP: 1.02 (ref 1–1.03)
UROBILINOGEN UR STRIP-MCNC: NORMAL MG/DL
WBC # BLD AUTO: 10.2 10E3/UL (ref 4–11)
WBC URINE: 1 /HPF

## 2022-09-14 PROCEDURE — 86038 ANTINUCLEAR ANTIBODIES: CPT | Performed by: INTERNAL MEDICINE

## 2022-09-14 PROCEDURE — 85652 RBC SED RATE AUTOMATED: CPT | Performed by: PATHOLOGY

## 2022-09-14 PROCEDURE — 80053 COMPREHEN METABOLIC PANEL: CPT | Performed by: PATHOLOGY

## 2022-09-14 PROCEDURE — 86036 ANCA SCREEN EACH ANTIBODY: CPT | Performed by: INTERNAL MEDICINE

## 2022-09-14 PROCEDURE — 87389 HIV-1 AG W/HIV-1&-2 AB AG IA: CPT | Performed by: INTERNAL MEDICINE

## 2022-09-14 PROCEDURE — 86160 COMPLEMENT ANTIGEN: CPT | Performed by: INTERNAL MEDICINE

## 2022-09-14 PROCEDURE — 86235 NUCLEAR ANTIGEN ANTIBODY: CPT | Performed by: INTERNAL MEDICINE

## 2022-09-14 PROCEDURE — 86803 HEPATITIS C AB TEST: CPT | Performed by: INTERNAL MEDICINE

## 2022-09-14 PROCEDURE — 86200 CCP ANTIBODY: CPT | Performed by: INTERNAL MEDICINE

## 2022-09-14 PROCEDURE — 86704 HEP B CORE ANTIBODY TOTAL: CPT | Performed by: INTERNAL MEDICINE

## 2022-09-14 PROCEDURE — 86481 TB AG RESPONSE T-CELL SUSP: CPT | Performed by: INTERNAL MEDICINE

## 2022-09-14 PROCEDURE — 86780 TREPONEMA PALLIDUM: CPT | Performed by: INTERNAL MEDICINE

## 2022-09-14 PROCEDURE — 83876 ASSAY MYELOPEROXIDASE: CPT | Performed by: INTERNAL MEDICINE

## 2022-09-14 PROCEDURE — 87340 HEPATITIS B SURFACE AG IA: CPT | Performed by: INTERNAL MEDICINE

## 2022-09-14 PROCEDURE — 81001 URINALYSIS AUTO W/SCOPE: CPT | Performed by: PATHOLOGY

## 2022-09-14 PROCEDURE — 99000 SPECIMEN HANDLING OFFICE-LAB: CPT | Performed by: PATHOLOGY

## 2022-09-14 PROCEDURE — 36415 COLL VENOUS BLD VENIPUNCTURE: CPT | Performed by: PATHOLOGY

## 2022-09-14 PROCEDURE — 85025 COMPLETE CBC W/AUTO DIFF WBC: CPT | Performed by: PATHOLOGY

## 2022-09-14 PROCEDURE — 86431 RHEUMATOID FACTOR QUANT: CPT | Performed by: INTERNAL MEDICINE

## 2022-09-14 PROCEDURE — 82652 VIT D 1 25-DIHYDROXY: CPT | Performed by: INTERNAL MEDICINE

## 2022-09-14 PROCEDURE — G0463 HOSPITAL OUTPT CLINIC VISIT: HCPCS

## 2022-09-14 PROCEDURE — 82657 ENZYME CELL ACTIVITY: CPT | Mod: 90 | Performed by: PATHOLOGY

## 2022-09-14 PROCEDURE — 87040 BLOOD CULTURE FOR BACTERIA: CPT | Performed by: INTERNAL MEDICINE

## 2022-09-14 PROCEDURE — 86140 C-REACTIVE PROTEIN: CPT | Performed by: PATHOLOGY

## 2022-09-14 PROCEDURE — 99417 PROLNG OP E/M EACH 15 MIN: CPT | Performed by: INTERNAL MEDICINE

## 2022-09-14 PROCEDURE — 99205 OFFICE O/P NEW HI 60 MIN: CPT | Performed by: INTERNAL MEDICINE

## 2022-09-14 PROCEDURE — 86225 DNA ANTIBODY NATIVE: CPT | Performed by: INTERNAL MEDICINE

## 2022-09-14 PROCEDURE — 82164 ANGIOTENSIN I ENZYME TEST: CPT | Mod: 90 | Performed by: PATHOLOGY

## 2022-09-14 PROCEDURE — 82595 ASSAY OF CRYOGLOBULIN: CPT | Performed by: INTERNAL MEDICINE

## 2022-09-14 RX ORDER — PANTOPRAZOLE SODIUM 40 MG/1
40 TABLET, DELAYED RELEASE ORAL DAILY
Qty: 90 TABLET | Refills: 1 | Status: SHIPPED | OUTPATIENT
Start: 2022-09-14 | End: 2022-12-13

## 2022-09-14 RX ORDER — PREDNISONE 5 MG/1
TABLET ORAL
Qty: 200 TABLET | Refills: 1 | Status: SHIPPED | OUTPATIENT
Start: 2022-09-14 | End: 2022-11-29 | Stop reason: DRUGHIGH

## 2022-09-14 RX ORDER — PREDNISONE 2.5 MG/1
TABLET ORAL
Qty: 28 TABLET | Refills: 0 | Status: SHIPPED | OUTPATIENT
Start: 2022-09-14 | End: 2022-11-29 | Stop reason: DRUGHIGH

## 2022-09-14 ASSESSMENT — PAIN SCALES - GENERAL: PAINLEVEL: NO PAIN (0)

## 2022-09-14 NOTE — PROGRESS NOTES
"[facility] Rheumatology Consultation  Date of service: September 14, 2022    Patient name: Serjio Farris  YOB: 1980  MRN: [id number]    Reason for consult: Evaluation and treatment of behcets    HPI:  At age 22 would develop 5-6 ulcers back of throat last for 10 days or so. 1-2 times per month. Severely painful. Would try mouthwashes/losanges. Nothing was much helpful. Difficult to eat during these periods. These have continued. Then at age [age], woke up with droopy face on the left. Was told he had a \"mini stroke\"/ TIA. No stroke found on MRI. Symptoms resolved after about 24 hours.     Once he met his wife, has had numerous stomach issues. Has had multiple EGDs/Colonoscopies. Multiple bloody stools. Has had this ongoing since 2009/2010. Was on mezavant XL. Would be weak and fatigued during these episodes. Flares would last for days. [place] the [place] in [place] this therapy was stopped. Did food allergy testing. Started on FODMAP diet. He still has GI flares about once per month. Lasting for about 5 days or so. Stool can have bright red blood or be black/tarry.    In the last year has had 2 episodes of genital ulcers. 8-12 oral ulcers. He has found that the flares of his oral ulcers and GI flares with blood occur at the same time, though the genital ulcers are independent. He also has developed nasal ulcers which correspond to his oral/GI ulcers. Has had thorough infectious work-up with his recurrent oral/nasal/genital ulcers all of which has been unrevealing.     3 days prior to going to the ED last month and what ultimately prompted referral to rheumatology today, he reports he had a typical evening. Went to bed. Woke up around midnight with epigastric pain. Severe/unbearable. Was brought to the ED and eval done at that time included CT abdomen/ pelvis with contrast which showed minor soft tissue density and fat stranding around the origin of the celial artery, right " hepatic artery, and to a lesser extent the superior mesenteric artery. He was started on prednisone 80mg once daily with a taper over 2 months. He is currently on 40mg once daily today. His abdominal symptoms have resolved. Since starting prednisone he has not had interval oral/genital/GI bleeding.     No history of occular inflammation. Does have back pain/CMC/greater MTP pain.     Has pain in the bottom of his back, bilateral lumbar spine. Worst in the AM before he gets up out of bed. He has alternating between left/right at times. It has woken him up in the second half of the night.     No symptoms consistent with dactylitis.     Has a history of bradycardia. No history of DVT/PE. No symptoms consistent with raynauds.     Over the last few months he has noticed that he has been diaphoretic. He has a normal temperature. During flares he does have fatigue/chills.     Has had aching/shaking of his left hand/arm. Ongoing. No wrist drop/foot drop     14 point ROS collected and negative if not documented above.     Past Medical History:  Past Medical History:   Diagnosis Date     Depressive disorder 2012/2013    Due to my stomach issues and not being sorted. OK now     Stroke (H) age 25 approx    possible     Past Surgical History:  Past Surgical History:   Procedure Laterality Date     COLONOSCOPY N/A 04/27/2015    Procedure: COLONOSCOPY;  Surgeon: Pako Iqbal MD;  Location:  OR     COLONOSCOPY WITH CO2 INSUFFLATION N/A 04/27/2015    Procedure: COLONOSCOPY WITH CO2 INSUFFLATION;  Surgeon: Pako Iqbal MD;  Location:  OR     ESOPHAGOSCOPY, GASTROSCOPY, DUODENOSCOPY (EGD), COMBINED Left 05/28/2015    Procedure: COMBINED ESOPHAGOSCOPY, GASTROSCOPY, DUODENOSCOPY (EGD), BIOPSY SINGLE OR MULTIPLE;  Surgeon: Pako Iqbal MD;  Location:  GI     HC BREATH HYDROGEN TEST N/A 09/11/2015    Procedure: HYDROGEN BREATH TEST;  Surgeon: Pako Iqbal MD;  Location:  GI     HC TOOTH EXTRACTION W/FORCEP      age  "15     testicular torsion  2010     UROLOGY PROCEDURE                         DATE:  01/01/2012    Testicular Torsion     Medications:  Current Outpatient Medications   Medication     Multiple Vitamin (MULTIVITAMIN ADULT PO)     sertraline (ZOLOFT) 50 MG tablet     No current facility-administered medications for this visit.   Currently on 40mg of prednisone daily    Allergies:  Allergies   Allergen Reactions     Ibuprofen GI Disturbance     Family history:  No family history of autoimmune disease, though sister does have oral ulcers.     Social History:  , no children  ETOH: rare  Smoking: Quit smoking in 2014  Drug use: No IVDU  Occupation: Works for [employer]    Objective:  /78   Pulse 73   Ht 1.93 m (6' 4\")   Wt 88 kg (194 lb)   BMI 23.61 kg/m    GEN: sitting up unassisted, NAD, wife present for his encounter  HEENT: No facial rash, sclera clear, no oral or nasal ulcers, no inflammatory nasal bridge/external ear changes, good saliva pool  CV: RRR, no m/r/g  Pulm: CTAB no crackles wheezing ronchi  Abdomen: still with tenderness to palpation, predominantly in the epigastric region. Moderate. No rebound. No guarding.   Extremities: Full active and passive ROM of bilateral shoulders, elbows, wrists, MCPs, PIPs, DIPs, hips, knees, ankles. Able to make a full fist with good strength bilaterally. No synovitis of any joints. No dactylitis. No digital pitting. No nail changes. No sclerodactyly  Skin: No acute cutaneous changes     WBC   Date Value Ref Range Status   02/25/2020 6.2 4.0 - 11.0 10e9/L Final     WBC Count   Date Value Ref Range Status   06/17/2022 5.2 4.0 - 11.0 10e3/uL Final     Hemoglobin   Date Value Ref Range Status   06/17/2022 14.6 13.3 - 17.7 g/dL Final   02/25/2020 14.7 13.3 - 17.7 g/dL Final     Platelet Count   Date Value Ref Range Status   06/17/2022 180 150 - 450 10e3/uL Final   02/25/2020 152 150 - 450 10e9/L Final     Creatinine   Date Value Ref Range Status   06/17/2022 0.95 " 0.66 - 1.25 mg/dL Final   02/25/2020 1.03 0.66 - 1.25 mg/dL Final     Lab Results   Component Value Date    ALKPHOS 66 06/17/2022    ALKPHOS 70 02/25/2020     AST   Date Value Ref Range Status   06/17/2022 18 0 - 45 U/L Final   02/25/2020 14 0 - 45 U/L Final     Lab Results   Component Value Date    ALT 21 06/17/2022    ALT 19 02/25/2020     Sed Rate   Date Value Ref Range Status   04/07/2015 7 0 - 15 mm/h Final   /  CRP Inflammation   Date Value Ref Range Status   04/07/2015 <2.9 0.0 - 8.0 mg/L Final     UA RESULTS:  No results for input(s): COLOR, APPEARANCE, URINEGLC, URINEBILI, URINEKETONE, SG, UBLD, URINEPH, PROTEIN, UROBILINOGEN, NITRITE, LEUKEST, RBCU, WBCU in the last [id number] hours.   MARVIN Screen by EIA   Date Value Ref Range Status   05/05/2015 <1.0  Interpretation:  Negative   <1.0 Final     Rheumatoid Factor   Date Value Ref Range Status   05/05/2015 <20 <20 IU/mL Final     Imaging  8/24/22  CT abdomen and pelvis with contrast at [facility] ED available in care everywhere  FINDINGS:   Lung bases clear     Small cyst left hepatic lobe near the dome.  A few other tiny cystic lesions   too small to characterize though likely also small cysts.     Unremarkable gallbladder     Unremarkable spleen and adrenal glands.     In normal pancreas.     Normal perfusion of kidneys.  Unremarkable bladder     No inflamed or obstructed bowel.  No significant atherosclerotic   calcifications.  Proximal portion of the major midline vessels patent.     Minor ill-defined soft tissue density around the celiac artery, origin of the   superior mesenteric artery and around the proximal segment of the hepatic   arteries.  Mild narrowing at the origin of the celiac artery.     No enlarged lymph nodes.     Para-aortic fat appeared normal without findings of adenopathy or   retroperitoneal fibrosis around the aorta.  Negative for retroperitoneal mass.    Impression  1. Minor soft tissue density and fat stranding around origin of celiac  "artery,   right hepatic artery and to lesser extent superior mesenteric artery.  Some   component of retroperitoneal fibrosis, vasculitis or less likely   lymphoproliferative malignancy are differentials.  The mild narrowing of celiac   artery with slight inferior displacement at origin less likely to reflect a   component of median arcuate ligament syndrome.   2. Negative for adenopathy   3. Negative for findings of retroperitoneal fibrosis along aorta margins   4. No significant discrepancy with preliminary   5.  CODE 3:  Report contains unexpected findings requiring further evaluation   or follow-up.    A/P  Pleasant 42 year old male is referred to rheumatology for evaluation of vasculitis in the setting of a recent ED evaluation on 8/24/22 for severe abdominal pain during which a CT abdomen/pelvis with contrast was obtained and showed minor soft tissue density and fat stranding around the origin of the celiac artery, right hepatic artery and to a lesser extent superior mesenteric artery.     In taking a thorough history today it is notable for  1. Recurrent oral ulcerations: 5-12 at one time. Occur at least monthly. Infectious work-up negative. Started around age 22  2. Recurrent nasal ulcerations: occur at the same time as his oral ulcers  3. 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 Has had scarring on his scrotum due to prior lesions  4. Recurrent abdominal pain associated with bloody stool (sometimes bright red sometimes black/tarry): occurs at the same time as his oral/nasal ulcers.  5. Has a history of what sounds like a TIA. Was told it was a \"mini stroke\". This was in the [place]. No records are available to review in our system.     No evidence of cutaneous lesions or occular inflammation by history/exam/review of the record.    Taken together I do have concern for vasculitis and more specifically behcet's given his clinical history/findings.     Will " plan to do the followin. Labs to include CBC, CMP, ESR, CRP, C3, C4, cryo, MARVIN, SSA, SSB, Smith, RNP, RF, CCP, ANCA, MPO, PR3, IgG subclasses, treponema with reflex, Hep B/C/HIV/quant gold, ACE, 1,25 dihydroxyvitamin D, TPMT  2. Given concern and question of behcets and cardiac involvement, will obtain echocardiogram  3. With his symptoms suggesting a component of inflammation of SI joints/low back, will obtain xrays of sacroiliac joints  4. Will obtain CTA of the chest abdomen pelvis on 10/15/22 to assess for disease activity with prednisone taper and to assess for disease activity in the chest which has not been evaluated. Also to evaluate for vessel aneurysm dilation in the chest.   5. Will adjust his prednisone taper in the following way: he was on 80mg once daily from - 80mg daily decreased down to 40mg daily on . Will continue with the following taper plan:  -  40mg daily then  - 10/4 30mg daily then  10/5-10/18 20 mg daily then  10/19- 17.5 mg daily then  -11/15  15 mg daily then  - 12.5mg daily then   start 10mg daily until follow-up with me and we discuss a change in this plan     We will follow-up on 10/21 by video visit to discuss results of all studies above and to assess for disease activity by symptoms at that time/next steps for steroid sparing therapy depending on results.     Jimmy Vann MD  Rheumatology    I spent a total of 120 minutes on the date of service on chart review (care everywhere, patient brings in CD of images, scanned reports), patient encounter, , documentation, discussion with patient and his wife re: potential diagnosis and work up and potential treatment depending on work-up and answering questions.

## 2022-09-14 NOTE — LETTER
Date:October 5, 2022      Provider requested that no letter be sent. Do not send.       [facility]

## 2022-09-14 NOTE — NURSING NOTE
"Chief Complaint   Patient presents with     Consult     Establish care with [facility]     /78   Pulse 73   Ht 1.93 m (6' 4\")   Wt 88 kg (194 lb)   BMI 23.61 kg/m    Zora Sandra CMA on 9/14/2022 at 12:54 PM    "

## 2022-09-14 NOTE — LETTER
"9/14/2022       RE: Serjio Farris  [address]  [place] [address]     Dear Colleague,    Thank you for referring your patient, Serjio Farris, to the [facility]. Please see a copy of my visit note below.    [facility] Outpatient Rheumatology Consultation  Date of service: September 14, 2022    Patient name: Serjio Farris  YOB: 1980  MRN: [id number]    Reason for consult: Evaluation and treatment of behcets    HPI:  At age 22 would develop 5-6 ulcers back of throat last for 10 days or so. 1-2 times per month. Severely painful. Would try mouthwashes/losanges. Nothing was much helpful. Difficult to eat during these periods. These have continued. Then at age [age], woke up with droopy face on the left. Was told he had a \"mini stroke\"/ TIA. No stroke found on MRI. Symptoms resolved after about 24 hours.     Once he met his wife, has had numerous stomach issues. Has had multiple EGDs/Colonoscopies. Multiple bloody stools. Has had this ongoing since 2009/2010. Was on mezavant XL. Would be weak and fatigued during these episodes. Flares would last for days. [place] in [place] this therapy was stopped. Did food allergy testing. Started on FODMAP diet. He still has GI flares about once per month. Lasting for about 5 days or so. Stool can have bright red blood or be black/tarry.    In the last year has had 2 episodes of genital ulcers. 8-12 oral ulcers. He has found that the flares of his oral ulcers and GI flares with blood occur at the same time, though the genital ulcers are independent. He also has developed nasal ulcers which correspond to his oral/GI ulcers. Has had thorough infectious work-up with his recurrent oral/nasal/genital ulcers all of which has been unrevealing.     3 days prior to going to the ED last month and what ultimately prompted referral to rheumatology " today, he reports he had a typical evening. Went to bed. Woke up around midnight with epigastric pain. Severe/unbearable. Was brought to the ED and eval done at that time included CT abdomen/ pelvis with contrast which showed minor soft tissue density and fat stranding around the origin of the celial artery, right hepatic artery, and to a lesser extent the superior mesenteric artery. He was started on prednisone 80mg once daily with a taper over 2 months. He is currently on 40mg once daily today. His abdominal symptoms have resolved. Since starting prednisone he has not had interval oral/genital/GI bleeding.     No history of occular inflammation. Does have back pain/CMC/greater MTP pain.     Has pain in the bottom of his back, bilateral lumbar spine. Worst in the AM before he gets up out of bed. He has alternating between left/right at times. It has woken him up in the second half of the night.     No symptoms consistent with dactylitis.     Has a history of bradycardia. No history of DVT/PE. No symptoms consistent with raynauds.     Over the last few months he has noticed that he has been diaphoretic. He has a normal temperature. During flares he does have fatigue/chills.     Has had aching/shaking of his left hand/arm. Ongoing. No wrist drop/foot drop     14 point ROS collected and negative if not documented above.     Past Medical History:  Past Medical History:   Diagnosis Date     Depressive disorder 2012/2013    Due to my stomach issues and not being sorted. OK now     Stroke (H) age 25 approx    possible     Past Surgical History:  Past Surgical History:   Procedure Laterality Date     COLONOSCOPY N/A 04/27/2015    Procedure: COLONOSCOPY;  Surgeon: Pako Iqbal MD;  Location:  OR     COLONOSCOPY WITH CO2 INSUFFLATION N/A 04/27/2015    Procedure: COLONOSCOPY WITH CO2 INSUFFLATION;  Surgeon: Pako Iqbal MD;  Location:  OR     ESOPHAGOSCOPY, GASTROSCOPY, DUODENOSCOPY (EGD), COMBINED Left 05/28/2015  "   Procedure: COMBINED ESOPHAGOSCOPY, GASTROSCOPY, DUODENOSCOPY (EGD), BIOPSY SINGLE OR MULTIPLE;  Surgeon: Pako Iqbal MD;  Location:  GI     HC BREATH HYDROGEN TEST N/A 09/11/2015    Procedure: HYDROGEN BREATH TEST;  Surgeon: Pako Iqbal MD;  Location:  GI      TOOTH EXTRACTION W/FORCEP      age 15     testicular torsion  2010     UROLOGY PROCEDURE                         DATE:  01/01/2012    Testicular Torsion     Medications:  Current Outpatient Medications   Medication     Multiple Vitamin (MULTIVITAMIN ADULT PO)     sertraline (ZOLOFT) 50 MG tablet     No current facility-administered medications for this visit.   Currently on 40mg of prednisone daily    Allergies:  Allergies   Allergen Reactions     Ibuprofen GI Disturbance     Family history:  No family history of autoimmune disease, though sister does have oral ulcers.     Social History:  , no children  ETOH: rare  Smoking: Quit smoking in 2014  Drug use: No IVDU  Occupation: Works for [employer]    Objective:  /78   Pulse 73   Ht 1.93 m (6' 4\")   Wt 88 kg (194 lb)   BMI 23.61 kg/m    GEN: sitting up unassisted, NAD, wife present for his encounter  HEENT: No facial rash, sclera clear, no oral or nasal ulcers, no inflammatory nasal bridge/external ear changes, good saliva pool  CV: RRR, no m/r/g  Pulm: CTAB no crackles wheezing ronchi  Abdomen: still with tenderness to palpation, predominantly in the epigastric region. Moderate. No rebound. No guarding.   Extremities: Full active and passive ROM of bilateral shoulders, elbows, wrists, MCPs, PIPs, DIPs, hips, knees, ankles. Able to make a full fist with good strength bilaterally. No synovitis of any joints. No dactylitis. No digital pitting. No nail changes. No sclerodactyly  Skin: No acute cutaneous changes     WBC   Date Value Ref Range Status   02/25/2020 6.2 4.0 - 11.0 10e9/L Final     WBC Count   Date Value Ref Range Status   06/17/2022 5.2 4.0 - 11.0 10e3/uL Final "     Hemoglobin   Date Value Ref Range Status   06/17/2022 14.6 13.3 - 17.7 g/dL Final   02/25/2020 14.7 13.3 - 17.7 g/dL Final     Platelet Count   Date Value Ref Range Status   06/17/2022 180 150 - 450 10e3/uL Final   02/25/2020 152 150 - 450 10e9/L Final     Creatinine   Date Value Ref Range Status   06/17/2022 0.95 0.66 - 1.25 mg/dL Final   02/25/2020 1.03 0.66 - 1.25 mg/dL Final     Lab Results   Component Value Date    ALKPHOS 66 06/17/2022    ALKPHOS 70 02/25/2020     AST   Date Value Ref Range Status   06/17/2022 18 0 - 45 U/L Final   02/25/2020 14 0 - 45 U/L Final     Lab Results   Component Value Date    ALT 21 06/17/2022    ALT 19 02/25/2020     Sed Rate   Date Value Ref Range Status   04/07/2015 7 0 - 15 mm/h Final   /  CRP Inflammation   Date Value Ref Range Status   04/07/2015 <2.9 0.0 - 8.0 mg/L Final     UA RESULTS:  No results for input(s): COLOR, APPEARANCE, URINEGLC, URINEBILI, URINEKETONE, SG, UBLD, URINEPH, PROTEIN, UROBILINOGEN, NITRITE, LEUKEST, RBCU, WBCU in the last [id number] hours.   MRAVIN Screen by EIA   Date Value Ref Range Status   05/05/2015 <1.0  Interpretation:  Negative   <1.0 Final     Rheumatoid Factor   Date Value Ref Range Status   05/05/2015 <20 <20 IU/mL Final     Imaging  8/24/22  CT abdomen and pelvis with contrast at [facility] ED available in care everywhere  FINDINGS:   Lung bases clear     Small cyst left hepatic lobe near the dome.  A few other tiny cystic lesions   too small to characterize though likely also small cysts.     Unremarkable gallbladder     Unremarkable spleen and adrenal glands.     In normal pancreas.     Normal perfusion of kidneys.  Unremarkable bladder     No inflamed or obstructed bowel.  No significant atherosclerotic   calcifications.  Proximal portion of the major midline vessels patent.     Minor ill-defined soft tissue density around the celiac artery, origin of the   superior mesenteric artery and around the proximal segment of the hepatic   arteries.  " Mild narrowing at the origin of the celiac artery.     No enlarged lymph nodes.     Para-aortic fat appeared normal without findings of adenopathy or   retroperitoneal fibrosis around the aorta.  Negative for retroperitoneal mass.    Impression  1. Minor soft tissue density and fat stranding around origin of celiac artery,   right hepatic artery and to lesser extent superior mesenteric artery.  Some   component of retroperitoneal fibrosis, vasculitis or less likely   lymphoproliferative malignancy are differentials.  The mild narrowing of celiac   artery with slight inferior displacement at origin less likely to reflect a   component of median arcuate ligament syndrome.   2. Negative for adenopathy   3. Negative for findings of retroperitoneal fibrosis along aorta margins   4. No significant discrepancy with preliminary   5.  CODE 3:  Report contains unexpected findings requiring further evaluation   or follow-up.    A/P  Pleasant 42 year old male is referred to rheumatology for evaluation of vasculitis in the setting of a recent ED evaluation on 8/24/22 for severe abdominal pain during which a CT abdomen/pelvis with contrast was obtained and showed minor soft tissue density and fat stranding around the origin of the celiac artery, right hepatic artery and to a lesser extent superior mesenteric artery.     In taking a thorough history today it is notable for  1. Recurrent oral ulcerations: 5-12 at one time. Occur at least monthly. Infectious work-up negative. Started around age 22  2. Recurrent nasal ulcerations: occur at the same time as his oral ulcers  3. 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 Has had scarring on his scrotum due to prior lesions  4. Recurrent abdominal pain associated with bloody stool (sometimes bright red sometimes black/tarry): occurs at the same time as his oral/nasal ulcers.  5. Has a history of what sounds like a TIA. Was told it was a \"mini stroke\". This was " in the [place]. No records are available to review in our system.     No evidence of cutaneous lesions or occular inflammation by history/exam/review of the record.    Taken together I do have concern for vasculitis and more specifically behcet's given his clinical history/findings.     Will plan to do the followin. Labs to include CBC, CMP, ESR, CRP, C3, C4, cryo, MARVIN, SSA, SSB, Smith, RNP, RF, CCP, ANCA, MPO, PR3, IgG subclasses, treponema with reflex, Hep B/C/HIV/quant gold, ACE, 1,25 dihydroxyvitamin D, TPMT  2. Given concern and question of behcets and cardiac involvement, will obtain echocardiogram  3. With his symptoms suggesting a component of inflammation of SI joints/low back, will obtain xrays of sacroiliac joints  4. Will obtain CTA of the chest abdomen pelvis on 10/15/22 to assess for disease activity with prednisone taper and to assess for disease activity in the chest which has not been evaluated. Also to evaluate for vessel aneurysm dilation in the chest.   5. Will adjust his prednisone taper in the following way: he was on 80mg once daily from - 80mg daily decreased down to 40mg daily on . Will continue with the following taper plan:  -  40mg daily then  - 10/4 30mg daily then  10/5-10/18 20 mg daily then  10/19- 17.5 mg daily then  -11/15  15 mg daily then  - 12.5mg daily then   start 10mg daily until follow-up with me and we discuss a change in this plan     We will follow-up on 10/21 by video visit to discuss results of all studies above and to assess for disease activity by symptoms at that time/next steps for steroid sparing therapy depending on results.     Jimmy Vann MD  Rheumatology    I spent a total of 120 minutes on the date of service on chart review (care everywhere, patient brings in CD of images, scanned reports), patient encounter, , documentation, discussion with patient and his wife re:  potential diagnosis and work up and potential treatment depending on work-up and answering questions.             Again, thank you for allowing me to participate in the care of your patient.      Sincerely,    Jimmy Vann MD

## 2022-09-14 NOTE — PATIENT INSTRUCTIONS
1) labs  2) Ultrasound of the heart  3) Repeat CT scan of the abdomen and pelvis - though will also include the chest. Please schedule this on/around October 15th.  4) Prednisone plan:  9/7- 9/20 40mg daily then  9/21- 10/4 30mg daily then  10/5-10/18 20 mg daily then  10/19-11/1 17.5 mg daily then  11/2-11/15  15 mg daily then  11/16-11/29 12.5mg daily then  11/30 start 10mg daily until follow-up with me and we discuss a change in this plan     I have put in a script for 5mg and 2.5mg tabs. You may combine the 20mg tabs that you have with the 5mg and 2.5mg tabs to make up the total daily dose required in the plan above.     We will follow-up by video appointment on 10/21 at 930AM.    Do not hesitate to call/ message at any time with questions or concerns.

## 2022-09-15 LAB
ANA SER QL IF: NEGATIVE
ANCA AB PATTERN SER IF-IMP: NORMAL
C-ANCA TITR SER IF: NORMAL {TITER}
C3 SERPL-MCNC: 123 MG/DL (ref 81–157)
C4 SERPL-MCNC: 45 MG/DL (ref 13–39)
CCP AB SER IA-ACNC: <0.4 U/ML
DSDNA AB SER-ACNC: 0.6 IU/ML
ENA SM IGG SER IA-ACNC: <1.6 U/ML
ENA SM IGG SER IA-ACNC: NEGATIVE
ENA SS-A AB SER IA-ACNC: <0.5 U/ML
ENA SS-A AB SER IA-ACNC: NEGATIVE
ENA SS-B IGG SER IA-ACNC: <0.6 U/ML
ENA SS-B IGG SER IA-ACNC: NEGATIVE
HBV CORE AB SERPL QL IA: NONREACTIVE
HBV SURFACE AG SERPL QL IA: NONREACTIVE
HCV AB SERPL QL IA: NONREACTIVE
HIV 1+2 AB+HIV1 P24 AG SERPL QL IA: NONREACTIVE
RHEUMATOID FACT SER NEPH-ACNC: <6 IU/ML
T PALLIDUM AB SER QL: NONREACTIVE
U1 SNRNP IGG SER IA-ACNC: <1.1 U/ML
U1 SNRNP IGG SER IA-ACNC: NEGATIVE

## 2022-09-16 LAB
ACE SERPL-CCNC: 26 U/L
GAMMA INTERFERON BACKGROUND BLD IA-ACNC: 0.02 IU/ML
M TB IFN-G BLD-IMP: NEGATIVE
M TB IFN-G CD4+ BCKGRND COR BLD-ACNC: 3.58 IU/ML
MITOGEN IGNF BCKGRD COR BLD-ACNC: 0 IU/ML
MITOGEN IGNF BCKGRD COR BLD-ACNC: 0.01 IU/ML
MYELOPEROXIDASE AB SER IA-ACNC: <0.3 U/ML
MYELOPEROXIDASE AB SER IA-ACNC: NEGATIVE
PROTEINASE3 AB SER IA-ACNC: <1 U/ML
PROTEINASE3 AB SER IA-ACNC: NEGATIVE
QUANTIFERON MITOGEN: 3.6 IU/ML
QUANTIFERON NIL TUBE: 0.02 IU/ML
QUANTIFERON TB1 TUBE: 0.03 IU/ML
QUANTIFERON TB2 TUBE: 0.02

## 2022-09-18 LAB — TPMT BLD-CCNC: 27.2 U/ML

## 2022-09-19 LAB
BACTERIA BLD CULT: NO GROWTH
BACTERIA BLD CULT: NO GROWTH

## 2022-09-20 LAB — CRYOGLOB SER QL: ABNORMAL

## 2022-09-21 LAB — 1,25(OH)2D SERPL-MCNC: 61.2 PG/ML (ref 19.9–79.3)

## 2022-10-05 ENCOUNTER — HOSPITAL ENCOUNTER (OUTPATIENT)
Dept: CARDIOLOGY | Facility: CLINIC | Age: 42
Discharge: HOME | End: 2022-10-05
Attending: INTERNAL MEDICINE
Payer: COMMERCIAL

## 2022-10-05 ENCOUNTER — HOSPITAL ENCOUNTER (OUTPATIENT)
Dept: CT IMAGING | Facility: CLINIC | Age: 42
Discharge: HOME | End: 2022-10-05
Attending: INTERNAL MEDICINE
Payer: COMMERCIAL

## 2022-10-05 DIAGNOSIS — I77.6 AORTITIS (H): ICD-10-CM

## 2022-10-05 LAB — LVEF ECHO: NORMAL

## 2022-10-05 PROCEDURE — 250N000009 HC RX 250: Performed by: INTERNAL MEDICINE

## 2022-10-05 PROCEDURE — 93306 TTE W/DOPPLER COMPLETE: CPT

## 2022-10-05 PROCEDURE — 71275 CT ANGIOGRAPHY CHEST: CPT

## 2022-10-05 PROCEDURE — 93306 TTE W/DOPPLER COMPLETE: CPT | Mod: 26 | Performed by: INTERNAL MEDICINE

## 2022-10-05 PROCEDURE — 74174 CTA ABD&PLVS W/CONTRAST: CPT

## 2022-10-05 PROCEDURE — 250N000011 HC RX IP 250 OP 636: Performed by: INTERNAL MEDICINE

## 2022-10-05 RX ORDER — IOPAMIDOL 755 MG/ML
500 INJECTION, SOLUTION INTRAVASCULAR ONCE
Status: COMPLETED | OUTPATIENT
Start: 2022-10-05 | End: 2022-10-05

## 2022-10-05 RX ADMIN — IOPAMIDOL 80 ML: 755 INJECTION, SOLUTION INTRAVENOUS at 13:52

## 2022-10-05 RX ADMIN — SODIUM CHLORIDE 80 ML: 9 INJECTION, SOLUTION INTRAVENOUS at 13:52

## 2022-10-21 ENCOUNTER — TELEPHONE (OUTPATIENT)
Dept: RHEUMATOLOGY | Facility: CLINIC | Age: 42
End: 2022-10-21

## 2022-10-21 ENCOUNTER — VIRTUAL VISIT (OUTPATIENT)
Dept: RHEUMATOLOGY | Facility: CLINIC | Age: 42
End: 2022-10-21
Attending: INTERNAL MEDICINE
Payer: COMMERCIAL

## 2022-10-21 DIAGNOSIS — M35.2 BEHCET'S DISEASE (H): Primary | ICD-10-CM

## 2022-10-21 PROCEDURE — 99213 OFFICE O/P EST LOW 20 MIN: CPT | Mod: GT | Performed by: INTERNAL MEDICINE

## 2022-10-21 RX ORDER — COLCHICINE 0.6 MG/1
0.6 TABLET ORAL 2 TIMES DAILY
Qty: 180 TABLET | Refills: 1 | Status: SHIPPED | OUTPATIENT
Start: 2022-10-21 | End: 2022-11-07

## 2022-10-21 RX ORDER — PREDNISONE 2.5 MG/1
TABLET ORAL
Qty: 84 TABLET | Refills: 0 | Status: SHIPPED | OUTPATIENT
Start: 2022-10-21 | End: 2022-11-29 | Stop reason: DRUGHIGH

## 2022-10-21 NOTE — LETTER
Date:November 10, 2022      Patient was self referred, no letter generated. Do not send.        [facility] Health Information

## 2022-10-21 NOTE — PROGRESS NOTES
"Serjio Farris  is being evaluated via a billable video visit.      Patient denies any changes since echeck-in regarding medication and allergies and states all information entered during echeck-in remains accurate.    How would you like to obtain your AVS? Toihart  For the video visit, send the invitation by: Text to cell phone: [phone]  Will anyone else be joining your video visit? No    Patient location: home  Physician location: off site  Video start time: 9:36 AM  Video end time: 9:52 AM  Noe Vann MD  Rheumatology      [facility] Outpatient Rheumatology Follow-up  Date of service: 10/21/22    Patient name: Serjio Farris  YOB: 1980  MRN: [id number]    Reason for consult: Evaluation and treatment of behcets    HPI:  Interval history:  10/21/22  -2-3 GI flares (took naproxen which resolves them in 2-3 days). Reports that all episodes were associated with blood in his stool  -1 oral flare of ulcers.  -No genital ulcers/eye symptoms since his last visit  -Feels that his flares (specific oral ulcers) have been milder while being on steroids. Currently on 17.5mg of prednisone. Has noticed increased appetite. Initially some trouble sleeping in the first 1-2 weeks, then no issues. Has developed upper acne on back/neck/chest/arms with steroids. No other rashes  -No interval infections.     14 point review of systems collected and negative if not documented above.    HPI from initial consultation  At age 22 would develop 5-6 ulcers back of throat last for 10 days or so. 1-2 times per month. Severely painful. Would try mouthwashes/losanges. Nothing was much helpful. Difficult to eat during these periods. These have continued. Then at age [age], woke up with droopy face on the left. Was told he had a \"mini stroke\"/ TIA. No stroke found on MRI. Symptoms resolved after about 24 hours.     Once he met his wife, has had numerous stomach issues. Has had multiple EGDs/Colonoscopies. " Multiple bloody stools. Has had this ongoing since 2009/2010. Was on mezavant XL. Would be weak and fatigued during these episodes. Flares would last for days. [place] the [place] in [place] this therapy was stopped. Did food allergy testing. Started on FODMAP diet. He still has GI flares about once per month. Lasting for about 5 days or so. Stool can have bright red blood or be black/tarry.    In the last year has had 2 episodes of genital ulcers. 8-12 oral ulcers. He has found that the flares of his oral ulcers and GI flares with blood occur at the same time, though the genital ulcers are independent. He also has developed nasal ulcers which correspond to his oral/GI ulcers. Has had thorough infectious work-up with his recurrent oral/nasal/genital ulcers all of which has been unrevealing.     3 days prior to going to the ED last month and what ultimately prompted referral to rheumatology today, he reports he had a typical evening. Went to bed. Woke up around midnight with epigastric pain. Severe/unbearable. Was brought to the ED and eval done at that time included CT abdomen/ pelvis with contrast which showed minor soft tissue density and fat stranding around the origin of the celial artery, right hepatic artery, and to a lesser extent the superior mesenteric artery. He was started on prednisone 80mg once daily with a taper over 2 months. He is currently on 40mg once daily today. His abdominal symptoms have resolved. Since starting prednisone he has not had interval oral/genital/GI bleeding.     No history of occular inflammation. Does have back pain/CMC/greater MTP pain.     Has pain in the bottom of his back, bilateral lumbar spine. Worst in the AM before he gets up out of bed. He has alternating between left/right at times. It has woken him up in the second half of the night.     No symptoms consistent with dactylitis.     Has a history of bradycardia. No history of DVT/PE. No symptoms consistent with raynauds.      Over the last few months he has noticed that he has been diaphoretic. He has a normal temperature. During flares he does have fatigue/chills.     Has had aching/shaking of his left hand/arm. Ongoing. No wrist drop/foot drop     14 point ROS collected and negative if not documented above.     Past Medical History:  Past Medical History:   Diagnosis Date     Depressive disorder 2012/2013    Due to my stomach issues and not being sorted. OK now     Stroke (H) age 25 approx    possible     Past Surgical History:  Past Surgical History:   Procedure Laterality Date     COLONOSCOPY N/A 04/27/2015    Procedure: COLONOSCOPY;  Surgeon: Pako Iqbal MD;  Location: [facility] OR     COLONOSCOPY WITH CO2 INSUFFLATION N/A 04/27/2015    Procedure: COLONOSCOPY WITH CO2 INSUFFLATION;  Surgeon: Pako Iqbal MD;  Location: [facility] OR     ESOPHAGOSCOPY, GASTROSCOPY, DUODENOSCOPY (EGD), COMBINED Left 05/28/2015    Procedure: COMBINED ESOPHAGOSCOPY, GASTROSCOPY, DUODENOSCOPY (EGD), BIOPSY SINGLE OR MULTIPLE;  Surgeon: Pako Iqbal MD;  Location: [facility]     HC BREATH HYDROGEN TEST N/A 09/11/2015    Procedure: HYDROGEN BREATH TEST;  Surgeon: Pako Iqbal MD;  Location: [facility]     HC TOOTH EXTRACTION W/FORCEP      age 15     testicular torsion  2010     UROLOGY PROCEDURE                         DATE:  01/01/2012    Testicular Torsion     Medications:  Current Outpatient Medications   Medication     Multiple Vitamin (MULTIVITAMIN ADULT PO)     pantoprazole (PROTONIX) 40 MG EC tablet     predniSONE (DELTASONE) 2.5 MG tablet     predniSONE (DELTASONE) 5 MG tablet     sertraline (ZOLOFT) 50 MG tablet     No current facility-administered medications for this visit.   Currently on 40mg of prednisone daily    Allergies:  Allergies   Allergen Reactions     Ibuprofen GI Disturbance     Family history:  No family history of autoimmune disease, though sister does have oral ulcers.     Social History:  , no children  ETOH: rare  Smoking:  Quit smoking in 2014  Drug use: No IVDU  Occupation: Works for [email or web address]    Objective:  There were no vitals taken for this visit.  No vitals for this video visit. Vitals reviewed in Epic.  GEN: Sitting up unassisted. NAD. Wife is next to him  HEENT: no facial rash, sclera clear, no inflammatory nose/ external ear changes  Pulm: speaking in full sentences, no cough, no audible wheezing, no use of accessory muscles  Skin: no acute cutaneous lesions  MSK: full active ROM of bilateral shoulders, elbows, wrists, MCPs, PIPs, DIPs. Can make full fist without difficulty. No erythema or edema of these joints.      WBC   Date Value Ref Range Status   02/25/2020 6.2 4.0 - 11.0 10e9/L Final     WBC Count   Date Value Ref Range Status   09/14/2022 10.2 4.0 - 11.0 10e3/uL Final     Hemoglobin   Date Value Ref Range Status   09/14/2022 15.8 13.3 - 17.7 g/dL Final   02/25/2020 14.7 13.3 - 17.7 g/dL Final     Platelet Count   Date Value Ref Range Status   09/14/2022 212 150 - 450 10e3/uL Final   02/25/2020 152 150 - 450 10e9/L Final     Creatinine   Date Value Ref Range Status   09/14/2022 0.94 0.67 - 1.17 mg/dL Final   02/25/2020 1.03 0.66 - 1.25 mg/dL Final     Lab Results   Component Value Date    ALKPHOS 66 06/17/2022    ALKPHOS 70 02/25/2020     AST   Date Value Ref Range Status   09/14/2022 17 10 - 50 U/L Final   02/25/2020 14 0 - 45 U/L Final     Lab Results   Component Value Date    ALT 21 06/17/2022    ALT 19 02/25/2020     Sed Rate   Date Value Ref Range Status   04/07/2015 7 0 - 15 mm/h Final     Erythrocyte Sedimentation Rate   Date Value Ref Range Status   09/14/2022 7 0 - 15 mm/hr Final   /  CRP Inflammation   Date Value Ref Range Status   09/14/2022 <3.00 <5.00 mg/L Final   04/07/2015 <2.9 0.0 - 8.0 mg/L Final     UA RESULTS:  No results for input(s): COLOR, APPEARANCE, URINEGLC, URINEBILI, URINEKETONE, SG, UBLD, URINEPH, PROTEIN, UROBILINOGEN, NITRITE, LEUKEST, RBCU, WBCU in the last [id number] hours.   MARVIN Screen by  EIA   Date Value Ref Range Status   05/05/2015 <1.0  Interpretation:  Negative   <1.0 Final     DNA (ds) Antibody   Date Value Ref Range Status   09/14/2022 0.6 <10.0 IU/mL Final     Comment:     Negative     RNP Antibody IgG   Date Value Ref Range Status   09/14/2022 Negative Negative Final     Rheumatoid Factor   Date Value Ref Range Status   09/14/2022 <6 <12 IU/mL Final   05/05/2015 <20 <20 IU/mL Final     Cyclic Citrullinated Peptide Antibody IgG   Date Value Ref Range Status   09/14/2022 <0.4 <7.0 U/mL Final     Comment:     Negative     Myeloperoxidase Antibody IgG   Date Value Ref Range Status   09/14/2022 Negative Negative Final     Proteinase 3 Antibody IgG   Date Value Ref Range Status   09/14/2022 Negative Negative Final     Imaging  8/24/22  CT abdomen and pelvis with contrast at [facility] available in care everywhere  FINDINGS:   Lung bases clear     Small cyst left hepatic lobe near the dome.  A few other tiny cystic lesions   too small to characterize though likely also small cysts.     Unremarkable gallbladder     Unremarkable spleen and adrenal glands.     In normal pancreas.     Normal perfusion of kidneys.  Unremarkable bladder     No inflamed or obstructed bowel.  No significant atherosclerotic   calcifications.  Proximal portion of the major midline vessels patent.     Minor ill-defined soft tissue density around the celiac artery, origin of the   superior mesenteric artery and around the proximal segment of the hepatic   arteries.  Mild narrowing at the origin of the celiac artery.     No enlarged lymph nodes.     Para-aortic fat appeared normal without findings of adenopathy or   retroperitoneal fibrosis around the aorta.  Negative for retroperitoneal mass.    Impression  1. Minor soft tissue density and fat stranding around origin of celiac artery,   right hepatic artery and to lesser extent superior mesenteric artery.  Some   component of retroperitoneal fibrosis, vasculitis or less likely  "  lymphoproliferative malignancy are differentials.  The mild narrowing of celiac   artery with slight inferior displacement at origin less likely to reflect a   component of median arcuate ligament syndrome.   2. Negative for adenopathy   3. Negative for findings of retroperitoneal fibrosis along aorta margins   4. No significant discrepancy with preliminary   5.  CODE 3:  Report contains unexpected findings requiring further evaluation   or follow-up.    A/P  Pleasant 42 year old male was referred to rheumatology for evaluation of vasculitis in the setting of a recent ED evaluation on 8/24/22 for severe abdominal pain during which a CT abdomen/pelvis with contrast was obtained and showed minor soft tissue density and fat stranding around the origin of the celiac artery, right hepatic artery and to a lesser extent superior mesenteric artery.     His history is notable for  1. Recurrent oral ulcerations: 5-12 at one time. Occur at least monthly. Infectious work-up negative. Started around age 22  2. Recurrent nasal ulcerations: occur at the same time as his oral ulcers  3. 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 Has had scarring on his scrotum due to prior lesions  4. Recurrent abdominal pain associated with bloody stool (sometimes bright red sometimes black/tarry): occurs at the same time as his oral/nasal ulcers.  5. Has a history of what sounds like a TIA. Was told it was a \"mini stroke\". This was in the [place] prior to [place] the [place]. No records are available to review in our system.     No evidence of cutaneous lesions or occular inflammation by history/exam/review of the record.     Taken together I did have concern for vasculitis and more specifically behcets given his clinical history/findings. Additional work-up to ensure no other process was driving these findings was pursued and included  CBC, CMP, ESR, CRP, C3, C4, cryo, MARVIN, SSA, SSB, Smith, RNP, RF, CCP, ANCA, MPO, PR3, " IgG subclasses, treponema with reflex, Hep B/C/HIV/quant gold, ACE, 1,25 dihydroxyvitamin D all of which was negative/normal. I also obtained a TPMT enzyme which was normal.  He presented to me on prednisone 40mg daily which was started in the ED and taper plan established: 9/7- 9/20 40mg daily then 9/21- 10/4 30mg daily then 10/5-10/18 20 mg daily then 10/19-11/1 17.5 mg daily then 11/2-11/15  15 mg daily then 11/16-11/29 12.5mg daily then  11/30 start 10mg daily then 7.5mg daily x2 weeks then 5mg daily x2 weeks then 2.5mg daily x2 weeks then stop. He is currently on 17.5mg daily today which is on track for the taper we outlined. We obtained repeat imaging on 10/5/22 with CTA chest abdomen pelvis which showed complete resolution of the previously noted inflammatory findings concerning for vasculitis.     Will continue his prednisone taper which is outlined above. Will start him on colchicine 0.6mg once daily as the initial agent for his behcets. Should this not be sufficient, we will double to 0.6mg BID. Have AZA and apremilast in reserve as next options. Risks and benefits of colchicine were discussed today. Knows to let me know if he develops GI side effects.    We will follow-up in about 12 weeks from now or sooner with new progressive symptoms.     PLAN  -Start colchicine 0.6mg daily  -Continue prednisone taper  -Follow-up in 3 months    Jimmy Vann MD  Rheumatology

## 2022-10-21 NOTE — TELEPHONE ENCOUNTER
LVM for patient to make a eturn in about 12 weeks (around 1/13/2023) for can use PAOs, Video or In Person- per patient preference with Dr. Vann

## 2022-10-21 NOTE — LETTER
10/21/2022       RE: Serjio Farris  [address]  [facility] [address]     Dear Colleague,    Thank you for referring your patient, Serjio Farris, to the [facility] RHEUMATOLOGY CLINIC [place] [facility]. Please see a copy of my visit note below.    Serjio Farris  is being evaluated via a billable video visit.      Patient denies any changes since echeck-in regarding medication and allergies and states all information entered during echeck-in remains accurate.    How would you like to obtain your AVS? MyChart  For the video visit, send the invitation by: Text to cell phone: [phone]  Will anyone else be joining your video visit? No    Patient location: home  Physician location: off site  Video start time: 9:36 AM  Video end time: 9:52 AM  Noe Vann MD  Rheumatology      [facility] Outpatient Rheumatology Follow-up  Date of service: 10/21/22    Patient name: Serjio Farris  YOB: 1980  MRN: [id number]    Reason for consult: Evaluation and treatment of behcets    HPI:  Interval history:  10/21/22  -2-3 GI flares (took naproxen which resolves them in 2-3 days). Reports that all episodes were associated with blood in his stool  -1 oral flare of ulcers.  -No genital ulcers/eye symptoms since his last visit  -Feels that his flares (specific oral ulcers) have been milder while being on steroids. Currently on 17.5mg of prednisone. Has noticed increased appetite. Initially some trouble sleeping in the first 1-2 weeks, then no issues. Has developed upper acne on back/neck/chest/arms with steroids. No other rashes  -No interval infections.     14 point review of systems collected and negative if not documented above.    HPI from initial consultation  At age 22 would develop 5-6 ulcers back of throat last for 10 days or so. 1-2 times per month. Severely painful. Would try mouthwashes/losanges. Nothing was much  "helpful. Difficult to eat during these periods. These have continued. Then at age [age], woke up with droopy face on the left. Was told he had a \"mini stroke\"/ TIA. No stroke found on MRI. Symptoms resolved after about 24 hours.     Once he met his wife, has had numerous stomach issues. Has had multiple EGDs/Colonoscopies. Multiple bloody stools. Has had this ongoing since 2009/2010. Was on mezavant XL. Would be weak and fatigued during these episodes. Flares would last for days. [place] in [place] this therapy was stopped. Did food allergy testing. Started on FODMAP diet. He still has GI flares about once per month. Lasting for about 5 days or so. Stool can have bright red blood or be black/tarry.    In the last year has had 2 episodes of genital ulcers. 8-12 oral ulcers. He has found that the flares of his oral ulcers and GI flares with blood occur at the same time, though the genital ulcers are independent. He also has developed nasal ulcers which correspond to his oral/GI ulcers. Has had thorough infectious work-up with his recurrent oral/nasal/genital ulcers all of which has been unrevealing.     3 days prior to going to the ED last month and what ultimately prompted referral to rheumatology today, he reports he had a typical evening. Went to bed. Woke up around midnight with epigastric pain. Severe/unbearable. Was brought to the ED and eval done at that time included CT abdomen/ pelvis with contrast which showed minor soft tissue density and fat stranding around the origin of the celial artery, right hepatic artery, and to a lesser extent the superior mesenteric artery. He was started on prednisone 80mg once daily with a taper over 2 months. He is currently on 40mg once daily today. His abdominal symptoms have resolved. Since starting prednisone he has not had interval oral/genital/GI bleeding.     No history of occular inflammation. Does have back pain/CMC/greater MTP pain.     Has pain in the " bottom of his back, bilateral lumbar spine. Worst in the AM before he gets up out of bed. He has alternating between left/right at times. It has woken him up in the second half of the night.     No symptoms consistent with dactylitis.     Has a history of bradycardia. No history of DVT/PE. No symptoms consistent with raynauds.     Over the last few months he has noticed that he has been diaphoretic. He has a normal temperature. During flares he does have fatigue/chills.     Has had aching/shaking of his left hand/arm. Ongoing. No wrist drop/foot drop     14 point ROS collected and negative if not documented above.     Past Medical History:  Past Medical History:   Diagnosis Date     Depressive disorder 2012/2013    Due to my stomach issues and not being sorted. OK now     Stroke (H) age 25 approx    possible     Past Surgical History:  Past Surgical History:   Procedure Laterality Date     COLONOSCOPY N/A 04/27/2015    Procedure: COLONOSCOPY;  Surgeon: Pako Iqbal MD;  Location: [facility] OR     COLONOSCOPY WITH CO2 INSUFFLATION N/A 04/27/2015    Procedure: COLONOSCOPY WITH CO2 INSUFFLATION;  Surgeon: Pako Iqbal MD;  Location: [facility] OR     ESOPHAGOSCOPY, GASTROSCOPY, DUODENOSCOPY (EGD), COMBINED Left 05/28/2015    Procedure: COMBINED ESOPHAGOSCOPY, GASTROSCOPY, DUODENOSCOPY (EGD), BIOPSY SINGLE OR MULTIPLE;  Surgeon: Pako Iqbal MD;  Location: [facility] GI     HC BREATH HYDROGEN TEST N/A 09/11/2015    Procedure: HYDROGEN BREATH TEST;  Surgeon: Pako Iqbal MD;  Location: [facility] GI     HC TOOTH EXTRACTION W/FORCEP      age 15     testicular torsion  2010     UROLOGY PROCEDURE                         DATE:  01/01/2012    Testicular Torsion     Medications:  Current Outpatient Medications   Medication     Multiple Vitamin (MULTIVITAMIN ADULT PO)     pantoprazole (PROTONIX) 40 MG EC tablet     predniSONE (DELTASONE) 2.5 MG tablet     predniSONE (DELTASONE) 5 MG tablet     sertraline (ZOLOFT) 50 MG tablet     No current  facility-administered medications for this visit.   Currently on 40mg of prednisone daily    Allergies:  Allergies   Allergen Reactions     Ibuprofen GI Disturbance     Family history:  No family history of autoimmune disease, though sister does have oral ulcers.     Social History:  , no children  ETOH: rare  Smoking: Quit smoking in 2014  Drug use: No IVDU  Occupation: Works for [employer]    Objective:  There were no vitals taken for this visit.  No vitals for this video visit. Vitals reviewed in Epic.  GEN: Sitting up unassisted. NAD. Wife is next to him  HEENT: no facial rash, sclera clear, no inflammatory nose/ external ear changes  Pulm: speaking in full sentences, no cough, no audible wheezing, no use of accessory muscles  Skin: no acute cutaneous lesions  MSK: full active ROM of bilateral shoulders, elbows, wrists, MCPs, PIPs, DIPs. Can make full fist without difficulty. No erythema or edema of these joints.      WBC   Date Value Ref Range Status   02/25/2020 6.2 4.0 - 11.0 10e9/L Final     WBC Count   Date Value Ref Range Status   09/14/2022 10.2 4.0 - 11.0 10e3/uL Final     Hemoglobin   Date Value Ref Range Status   09/14/2022 15.8 13.3 - 17.7 g/dL Final   02/25/2020 14.7 13.3 - 17.7 g/dL Final     Platelet Count   Date Value Ref Range Status   09/14/2022 212 150 - 450 10e3/uL Final   02/25/2020 152 150 - 450 10e9/L Final     Creatinine   Date Value Ref Range Status   09/14/2022 0.94 0.67 - 1.17 mg/dL Final   02/25/2020 1.03 0.66 - 1.25 mg/dL Final     Lab Results   Component Value Date    ALKPHOS 66 06/17/2022    ALKPHOS 70 02/25/2020     AST   Date Value Ref Range Status   09/14/2022 17 10 - 50 U/L Final   02/25/2020 14 0 - 45 U/L Final     Lab Results   Component Value Date    ALT 21 06/17/2022    ALT 19 02/25/2020     Sed Rate   Date Value Ref Range Status   04/07/2015 7 0 - 15 mm/h Final     Erythrocyte Sedimentation Rate   Date Value Ref Range Status   09/14/2022 7 0 - 15 mm/hr Final   /  CRP  Inflammation   Date Value Ref Range Status   09/14/2022 <3.00 <5.00 mg/L Final   04/07/2015 <2.9 0.0 - 8.0 mg/L Final     UA RESULTS:  No results for input(s): COLOR, APPEARANCE, URINEGLC, URINEBILI, URINEKETONE, SG, UBLD, URINEPH, PROTEIN, UROBILINOGEN, NITRITE, LEUKEST, RBCU, WBCU in the last [id number] hours.   MARVIN Screen by EIA   Date Value Ref Range Status   05/05/2015 <1.0  Interpretation:  Negative   <1.0 Final     DNA (ds) Antibody   Date Value Ref Range Status   09/14/2022 0.6 <10.0 IU/mL Final     Comment:     Negative     RNP Antibody IgG   Date Value Ref Range Status   09/14/2022 Negative Negative Final     Rheumatoid Factor   Date Value Ref Range Status   09/14/2022 <6 <12 IU/mL Final   05/05/2015 <20 <20 IU/mL Final     Cyclic Citrullinated Peptide Antibody IgG   Date Value Ref Range Status   09/14/2022 <0.4 <7.0 U/mL Final     Comment:     Negative     Myeloperoxidase Antibody IgG   Date Value Ref Range Status   09/14/2022 Negative Negative Final     Proteinase 3 Antibody IgG   Date Value Ref Range Status   09/14/2022 Negative Negative Final     Imaging  8/24/22  CT abdomen and pelvis with contrast at [facility] available in care everywhere  FINDINGS:   Lung bases clear     Small cyst left hepatic lobe near the dome.  A few other tiny cystic lesions   too small to characterize though likely also small cysts.     Unremarkable gallbladder     Unremarkable spleen and adrenal glands.     In normal pancreas.     Normal perfusion of kidneys.  Unremarkable bladder     No inflamed or obstructed bowel.  No significant atherosclerotic   calcifications.  Proximal portion of the major midline vessels patent.     Minor ill-defined soft tissue density around the celiac artery, origin of the   superior mesenteric artery and around the proximal segment of the hepatic   arteries.  Mild narrowing at the origin of the celiac artery.     No enlarged lymph nodes.     Para-aortic fat appeared normal without findings of  "adenopathy or   retroperitoneal fibrosis around the aorta.  Negative for retroperitoneal mass.    Impression  1. Minor soft tissue density and fat stranding around origin of celiac artery,   right hepatic artery and to lesser extent superior mesenteric artery.  Some   component of retroperitoneal fibrosis, vasculitis or less likely   lymphoproliferative malignancy are differentials.  The mild narrowing of celiac   artery with slight inferior displacement at origin less likely to reflect a   component of median arcuate ligament syndrome.   2. Negative for adenopathy   3. Negative for findings of retroperitoneal fibrosis along aorta margins   4. No significant discrepancy with preliminary   5.  CODE 3:  Report contains unexpected findings requiring further evaluation   or follow-up.    A/P  Pleasant 42 year old male was referred to rheumatology for evaluation of vasculitis in the setting of a recent ED evaluation on 8/24/22 for severe abdominal pain during which a CT abdomen/pelvis with contrast was obtained and showed minor soft tissue density and fat stranding around the origin of the celiac artery, right hepatic artery and to a lesser extent superior mesenteric artery.     His history is notable for  1. Recurrent oral ulcerations: 5-12 at one time. Occur at least monthly. Infectious work-up negative. Started around age 22  2. Recurrent nasal ulcerations: occur at the same time as his oral ulcers  3. 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 Has had scarring on his scrotum due to prior lesions  4. Recurrent abdominal pain associated with bloody stool (sometimes bright red sometimes black/tarry): occurs at the same time as his oral/nasal ulcers.  5. Has a history of what sounds like a TIA. Was told it was a \"mini stroke\". This was in the [place]. No records are available to review in our system.     No evidence of cutaneous lesions or occular inflammation by " history/exam/review of the record.     Taken together I did have concern for vasculitis and more specifically behcets given his clinical history/findings. Additional work-up to ensure no other process was driving these findings was pursued and included  CBC, CMP, ESR, CRP, C3, C4, cryo, MARVIN, SSA, SSB, Smith, RNP, RF, CCP, ANCA, MPO, PR3, IgG subclasses, treponema with reflex, Hep B/C/HIV/quant gold, ACE, 1,25 dihydroxyvitamin D all of which was negative/normal. I also obtained a TPMT enzyme which was normal.  He presented to me on prednisone 40mg daily which was started in the ED and taper plan established: 9/7- 9/20 40mg daily then 9/21- 10/4 30mg daily then 10/5-10/18 20 mg daily then 10/19-11/1 17.5 mg daily then 11/2-11/15  15 mg daily then 11/16-11/29 12.5mg daily then  11/30 start 10mg daily then 7.5mg daily x2 weeks then 5mg daily x2 weeks then 2.5mg daily x2 weeks then stop. He is currently on 17.5mg daily today which is on track for the taper we outlined. We obtained repeat imaging on 10/5/22 with CTA chest abdomen pelvis which showed complete resolution of the previously noted inflammatory findings concerning for vasculitis.     Will continue his prednisone taper which is outlined above. Will start him on colchicine 0.6mg once daily as the initial agent for his behcets. Should this not be sufficient, we will double to 0.6mg BID. Have AZA and apremilast in reserve as next options. Risks and benefits of colchicine were discussed today. Knows to let me know if he develops GI side effects.    We will follow-up in about 12 weeks from now or sooner with new progressive symptoms.     PLAN  -Start colchicine 0.6mg daily  -Continue prednisone taper  -Follow-up in 3 months    Jimmy Vann MD  Rheumatology            Again, thank you for allowing me to participate in the care of your patient.      Sincerely,    Jimmy Vann MD

## 2022-10-24 ENCOUNTER — TELEPHONE (OUTPATIENT)
Dept: RHEUMATOLOGY | Facility: CLINIC | Age: 42
End: 2022-10-24

## 2022-10-24 DIAGNOSIS — M35.2 BEHCET'S DISEASE (H): Primary | ICD-10-CM

## 2022-10-24 NOTE — TELEPHONE ENCOUNTER
Prior Authorization Retail Medication Request    Medication/Dose: colchicine (COLCYRS) 0.6 MG tablet  ICD code (if different than what is on RX):  M35.2

## 2022-10-25 NOTE — TELEPHONE ENCOUNTER
Central Prior Authorization Team   Phone: [phone]      PA Initiation    Medication: colchicine (COLCYRS) 0.6 MG tablet  Insurance Company: Mary Ann ([facility]) - Phone [phone] Fax [phone]  Pharmacy Filling the Rx: [id number] [address] AT [place] [address]  Filling Pharmacy Phone: [phone]  Filling Pharmacy Fax:    Start Date: 10/25/2022

## 2022-10-25 NOTE — TELEPHONE ENCOUNTER
PRIOR AUTHORIZATION DENIED    Medication: colchicine (COLCYRS) 0.6 MG tablet-DENIED    Denial Date: 10/25/2022    Denial Rational:           Appeal Information:

## 2022-10-26 ENCOUNTER — MYC MEDICAL ADVICE (OUTPATIENT)
Dept: RHEUMATOLOGY | Facility: CLINIC | Age: 42
End: 2022-10-26

## 2022-10-27 ENCOUNTER — TELEPHONE (OUTPATIENT)
Dept: RHEUMATOLOGY | Facility: CLINIC | Age: 42
End: 2022-10-27

## 2022-10-28 NOTE — TELEPHONE ENCOUNTER
Left vm patient's [facility] pharmacy requesting return call to discuss the denial on the Colchicine.

## 2022-10-31 ENCOUNTER — MYC MEDICAL ADVICE (OUTPATIENT)
Dept: FAMILY MEDICINE | Facility: CLINIC | Age: 42
End: 2022-10-31

## 2022-10-31 DIAGNOSIS — R06.83 SNORING: Primary | ICD-10-CM

## 2022-11-01 ENCOUNTER — E-VISIT (OUTPATIENT)
Dept: FAMILY MEDICINE | Facility: CLINIC | Age: 42
End: 2022-11-01
Payer: COMMERCIAL

## 2022-11-01 DIAGNOSIS — K12.0 CANKER SORE: ICD-10-CM

## 2022-11-01 PROCEDURE — 99421 OL DIG E/M SVC 5-10 MIN: CPT | Performed by: FAMILY MEDICINE

## 2022-11-01 RX ORDER — TRIAMCINOLONE ACETONIDE 0.1 %
PASTE (GRAM) DENTAL 2 TIMES DAILY
Qty: 5 G | Refills: 1 | Status: SHIPPED | OUTPATIENT
Start: 2022-11-01 | End: 2023-05-04

## 2022-11-01 NOTE — TELEPHONE ENCOUNTER
Provider E-Visit time total (minutes): 5 minutes    Taylor Oneal MD, [facility]

## 2022-11-01 NOTE — PATIENT INSTRUCTIONS
Thank you for choosing us for your care. I have placed an order for a prescription so that you can start treatment. View your full visit summary for details by clicking on the link below. Your pharmacist will able to address any questions you may have about the medication.     If you're not feeling better within 5-7 days, please schedule an appointment.  You can schedule an appointment right here in [facility], or call [phone]  If the visit is for the same symptoms as your eVisit, we'll refund the cost of your eVisit if seen within seven days.

## 2022-11-02 NOTE — TELEPHONE ENCOUNTER
Patient sent an evisit that is a visit which he will be charged for.   There was soemthing about sore in his mouth as well so refill the triamcinolone paste he had had int he past. If does not need it will cancel the order.   Sleep eval has been ordered.       MONICA

## 2022-11-02 NOTE — TELEPHONE ENCOUNTER
Dr. Walsh- see Revinate message below.  Please advise.  Solange Rabago RN    Question 11/1/2022 11:09 AM CDT - Filed by Patient   Please describe your symptoms. Snoring   Have you had these symptoms before? Yes   How long have you been having these symptoms? For more than a month   Please list any medications you are currently taking for this condition. None   Please describe any probable cause for these symptoms.  Ori   Wrap up    Anything else you would like to add?      Taylor Walsh MD     [facility]    11:52 AM  Note        Thank you for choosing us for your care. I have placed an order for a prescription so that you can start treatment. View your full visit summary for details by clicking on the link below. Your pharmacist will able to address any questions you may have about the medication.      If you're not feeling better within 5-7 days, please schedule an appointment.  You can schedule an appointment right here in [facility], or call [phone]  If the visit is for the same symptoms as your eVisit, we'll refund the cost of your eVisit if seen within seven days.

## 2022-11-03 ENCOUNTER — TELEPHONE (OUTPATIENT)
Dept: RHEUMATOLOGY | Facility: CLINIC | Age: 42
End: 2022-11-03

## 2022-11-03 ENCOUNTER — MYC MEDICAL ADVICE (OUTPATIENT)
Dept: RHEUMATOLOGY | Facility: CLINIC | Age: 42
End: 2022-11-03

## 2022-11-03 DIAGNOSIS — M35.2 BEHCET'S DISEASE (H): Primary | ICD-10-CM

## 2022-11-03 NOTE — TELEPHONE ENCOUNTER
Spoke with pharmacists at [facility] pharmacy who updates they do not carry Mitagari at their location and they had submitted for Colchicine and received the denial.    Collaborate with [facility] pharmacist Fidelia who updates, we will need a Letter of Medical Necessity from Dr. Vann.    Message to Dr. Vann to update.    Left voicemail with patient updating on the plan and alerting it may take 1-2 weeks for resolution of this matter.  Provide patient with option to pay 1 month out of pocket through [facility].    Sent f/u MyC message recapping the vm left of findings and plan.    Haley Brink RN  Rheumatology Clinic

## 2022-11-07 RX ORDER — COLCHICINE 0.6 MG/1
0.6 TABLET ORAL 2 TIMES DAILY
Qty: 60 TABLET | Refills: 0 | Status: SHIPPED | OUTPATIENT
Start: 2022-11-07 | End: 2022-11-08

## 2022-11-07 NOTE — TELEPHONE ENCOUNTER
Per patient MyC message he would like to use [facility] for the Colchicine refill while waiting for insurance approval.    Rx entered for 60 tabs, 0 refills based on previous signed orders and sent to [facility] pharmacy in [place] per patient's instructions.    MyC message update sent to patient.    Haley Brink RN  Rheumatology Clinic

## 2022-11-08 RX ORDER — COLCHICINE 0.6 MG/1
0.6 CAPSULE ORAL 2 TIMES DAILY
Qty: 60 CAPSULE | Refills: 5 | Status: SHIPPED | OUTPATIENT
Start: 2022-11-08 | End: 2023-07-18

## 2022-11-08 NOTE — TELEPHONE ENCOUNTER
Patient's [facility] formulary excludes all colchicine products other than Mitigare. Patient requested I speak to his wife due to him being at work. Clarified why all other colchicine products were not covered. Confirmed understanding. [facility] and [facility] unable to get Mitigare, [facility] is. Prescription resent per [facility] CPA. Requested pharmacy call pt's wife with cost prior to filling.        Fidelia Álvarez, Guillaume  Medication Therapy Management Pharmacist  [facility]  Phone: [phone]

## 2022-11-08 NOTE — TELEPHONE ENCOUNTER
Does this patient need to use mitagari to avoid this whole ordeal? Is the only problem that he is wanting to use a pharmacy that does not stock mitagari?    thanks

## 2022-11-08 NOTE — TELEPHONE ENCOUNTER
Duplicate request. See prior auth encounter 10/24/22 for denial. Please follow that encounter for denial rationale and appeal information if provider would like to appeal.

## 2022-11-22 ASSESSMENT — ANXIETY QUESTIONNAIRES
6. BECOMING EASILY ANNOYED OR IRRITABLE: MORE THAN HALF THE DAYS
GAD7 TOTAL SCORE: 7
2. NOT BEING ABLE TO STOP OR CONTROL WORRYING: NOT AT ALL
IF YOU CHECKED OFF ANY PROBLEMS ON THIS QUESTIONNAIRE, HOW DIFFICULT HAVE THESE PROBLEMS MADE IT FOR YOU TO DO YOUR WORK, TAKE CARE OF THINGS AT HOME, OR GET ALONG WITH OTHER PEOPLE: SOMEWHAT DIFFICULT
4. TROUBLE RELAXING: MORE THAN HALF THE DAYS
GAD7 TOTAL SCORE: 7
7. FEELING AFRAID AS IF SOMETHING AWFUL MIGHT HAPPEN: NOT AT ALL
GAD7 TOTAL SCORE: 7
8. IF YOU CHECKED OFF ANY PROBLEMS, HOW DIFFICULT HAVE THESE MADE IT FOR YOU TO DO YOUR WORK, TAKE CARE OF THINGS AT HOME, OR GET ALONG WITH OTHER PEOPLE?: SOMEWHAT DIFFICULT
1. FEELING NERVOUS, ANXIOUS, OR ON EDGE: SEVERAL DAYS
3. WORRYING TOO MUCH ABOUT DIFFERENT THINGS: NOT AT ALL
7. FEELING AFRAID AS IF SOMETHING AWFUL MIGHT HAPPEN: NOT AT ALL
5. BEING SO RESTLESS THAT IT IS HARD TO SIT STILL: MORE THAN HALF THE DAYS

## 2022-11-22 ASSESSMENT — PATIENT HEALTH QUESTIONNAIRE - PHQ9
SUM OF ALL RESPONSES TO PHQ QUESTIONS 1-9: 10
SUM OF ALL RESPONSES TO PHQ QUESTIONS 1-9: 10
10. IF YOU CHECKED OFF ANY PROBLEMS, HOW DIFFICULT HAVE THESE PROBLEMS MADE IT FOR YOU TO DO YOUR WORK, TAKE CARE OF THINGS AT HOME, OR GET ALONG WITH OTHER PEOPLE: SOMEWHAT DIFFICULT

## 2022-11-23 ENCOUNTER — MYC REFILL (OUTPATIENT)
Dept: RHEUMATOLOGY | Facility: CLINIC | Age: 42
End: 2022-11-23

## 2022-11-23 DIAGNOSIS — M35.2 BEHCET'S DISEASE (H): Primary | ICD-10-CM

## 2022-11-23 DIAGNOSIS — I77.6 AORTITIS (H): ICD-10-CM

## 2022-11-23 RX ORDER — PREDNISONE 2.5 MG/1
TABLET ORAL
Qty: 28 TABLET | Refills: 0 | Status: CANCELLED | OUTPATIENT
Start: 2022-11-23

## 2022-11-29 ENCOUNTER — VIRTUAL VISIT (OUTPATIENT)
Dept: FAMILY MEDICINE | Facility: CLINIC | Age: 42
End: 2022-11-29
Attending: FAMILY MEDICINE
Payer: COMMERCIAL

## 2022-11-29 DIAGNOSIS — G47.9 SLEEPING DIFFICULTIES: ICD-10-CM

## 2022-11-29 DIAGNOSIS — F33.1 MODERATE EPISODE OF RECURRENT MAJOR DEPRESSIVE DISORDER (H): Primary | ICD-10-CM

## 2022-11-29 DIAGNOSIS — M54.50 MIDLINE LOW BACK PAIN WITHOUT SCIATICA, UNSPECIFIED CHRONICITY: ICD-10-CM

## 2022-11-29 DIAGNOSIS — F41.1 GENERALIZED ANXIETY DISORDER: ICD-10-CM

## 2022-11-29 DIAGNOSIS — M35.2 BEHCET'S DISEASE (H): ICD-10-CM

## 2022-11-29 PROCEDURE — 99214 OFFICE O/P EST MOD 30 MIN: CPT | Mod: GT | Performed by: FAMILY MEDICINE

## 2022-11-29 RX ORDER — PREDNISONE 10 MG/1
TABLET ORAL
Qty: 75 TABLET | Refills: 1 | Status: SHIPPED | OUTPATIENT
Start: 2022-11-29 | End: 2023-03-13

## 2022-11-29 RX ORDER — GABAPENTIN 300 MG/1
300 CAPSULE ORAL AT BEDTIME
Qty: 90 CAPSULE | Refills: 0 | Status: SHIPPED | OUTPATIENT
Start: 2022-11-29 | End: 2023-02-26

## 2022-11-29 ASSESSMENT — PATIENT HEALTH QUESTIONNAIRE - PHQ9
SUM OF ALL RESPONSES TO PHQ QUESTIONS 1-9: 10
10. IF YOU CHECKED OFF ANY PROBLEMS, HOW DIFFICULT HAVE THESE PROBLEMS MADE IT FOR YOU TO DO YOUR WORK, TAKE CARE OF THINGS AT HOME, OR GET ALONG WITH OTHER PEOPLE: SOMEWHAT DIFFICULT

## 2022-11-29 ASSESSMENT — ANXIETY QUESTIONNAIRES: GAD7 TOTAL SCORE: 7

## 2022-11-29 NOTE — PROGRESS NOTES
Serjio is a 42 year old who is being evaluated via a billable video visit.      How would you like to obtain your AVS? MyChart  If the video visit is dropped, the invitation should be resent by: Text to cell phone: [phone]  Will anyone else be joining your video visit? No          Assessment & Plan     Moderate episode of recurrent major depressive disorder (H)  - mood improved. Continue on current regimen   - sertraline (ZOLOFT) 50 MG tablet; Take 1 tablet (50 mg) by mouth daily    Generalized anxiety disorder  - stable   - sertraline (ZOLOFT) 50 MG tablet; Take 1 tablet (50 mg) by mouth daily  - gabapentin (NEURONTIN) 300 MG capsule; Take 1 capsule (300 mg) by mouth At Bedtime    Behcet's disease (H)  - causing back pain. Prednisone started by rheumatology   - gabapentin (NEURONTIN) 300 MG capsule; Take 1 capsule (300 mg) by mouth At Bedtime    Midline low back pain without sciatica, unspecified chronicity  - gabapentin nightly started to help with night time pain.   - gabapentin (NEURONTIN) 300 MG capsule; Take 1 capsule (300 mg) by mouth At Bedtime    Sleeping difficulties  - discussed early introduction to light at the start of his day. Continue with proper sleep hygiene. If no improvement can refer to sleep psychology         25 minutes spent on the date of the encounter doing chart review, history and exam, documentation and further activities per the note       See Patient Instructions    No follow-ups on file.   Follow-up Visit   Expected date:  May 29, 2023 (Approximate)      Follow Up Appointment Details:     Follow-up with whom?: Me    Follow-Up for what?: Adult Preventive    How?: In Person                    Taylor Walsh MD  [facility]    Dameon Bassett is a 42 year old, presenting for the following health issues:  No chief complaint on file.      History of Present Illness       Back Pain:  He presents for follow up of back pain. Patient's back pain  is a recurring problem.  Location of back pain:  Right lower back and left lower back  Description of back pain: dull ache, sharp and shooting  Back pain spreads: nowhere    Since patient first noticed back pain, pain is: always present, but gets better and worse  Does back pain interfere with his job:  Yes      Mental Health Follow-up:  Patient presents to follow-up on Depression & Anxiety.Patient's depression since last visit has been:  Good  The patient is not having other symptoms associated with depression.  Patient's anxiety since last visit has been:  Medium  The patient is not having other symptoms associated with anxiety.  Any significant life events: health concerns  Patient is not feeling anxious or having panic attacks.  Patient has no concerns about alcohol or drug use.    He eats 2-3 servings of fruits and vegetables daily.He consumes 1 sweetened beverage(s) daily.He exercises with enough effort to increase his heart rate 20 to 29 minutes per day.  He exercises with enough effort to increase his heart rate 3 or less days per week.   He is taking medications regularly.    Today's PHQ-9         PHQ-9 Total Score: 10    PHQ-9 Q9 Thoughts of better off dead/self-harm past 2 weeks :   Not at all    How difficult have these problems made it for you to do your work, take care of things at home, or get along with other people: Somewhat difficult  Today's WILMAR-7 Score: 7     Per patient he has been having a hard time getting back to sleep after waking up. He has tried over the counter products which does help somewhat. Melatonin gave him bad dreams. He tries to have a good sleep routine.     He continues to have low back pain which e believes his linked to his recent diagnosis of behcet's. . States it almost feels like he is being electrocuted. Denies any numbness and tingling. Pain is usually worse at night and early mornings and seems to improve throughout the day.   Notes his pain was better when he was on  higher doses of prednisone.         Review of Systems   Constitutional, HEENT, cardiovascular, pulmonary, GI, , musculoskeletal, neuro, skin, endocrine and psych systems are negative, except as otherwise noted.      Objective           Vitals:  No vitals were obtained today due to virtual visit.    Physical Exam   GENERAL: Healthy, alert and no distress  EYES: Eyes grossly normal to inspection.  No discharge or erythema, or obvious scleral/conjunctival abnormalities.  RESP: No audible wheeze, cough, or visible cyanosis.  No visible retractions or increased work of breathing.    PSYCH: Mentation appears normal, affect normal/bright, judgement and insight intact, normal speech and appearance well-groomed.          Video-Visit Details    Video Start Time: 4:59 PM    Type of service:  Video Visit    Video End Time:5:16 PM    Originating Location (pt. Location): Home        Distant Location (provider location):  Off-site    Platform used for Video Visit: Clara

## 2022-11-29 NOTE — TELEPHONE ENCOUNTER
Spoke to Dr. Vann regarding patient MyC message requesting Prednisone refill and reports the reoccurrence of the oral sores.      Had left patient a message requesting clarification per Dr. Vann's request to clarify patient is taking the Mitigare twice daily.    Patient spouse called back as patient is not available but requested her to call us.  Spouse clarifies patient is taking the Mitigare twice daily for sometime.      Update the spouse on Dr. Vann's new plan for the the Prednisone taper:   Start taking 50 mg for 5 days; 40 mg for 5 days; 30 mg for 5 days; 20 mg for 5 days; 10 mg for 5 days; then stop.     Also, Dr. Vann would like a rtn visit x6 weeks, wife accepted VV on Friday, 1/13, at 3p.      Spouse verbalized understanding and is in agreement with the plan.     Prednisone order changed and entered per Dr. Vann's verbal message.      MyC message sent with all above details.      Haley Brink RN  [facility]ology [facility]

## 2023-01-06 ENCOUNTER — VIRTUAL VISIT (OUTPATIENT)
Dept: INTERNAL MEDICINE | Facility: CLINIC | Age: 43
End: 2023-01-06
Payer: COMMERCIAL

## 2023-01-06 DIAGNOSIS — J20.9 ACUTE BRONCHITIS WITH SYMPTOMS > 10 DAYS: Primary | ICD-10-CM

## 2023-01-06 DIAGNOSIS — J01.90 ACUTE SINUSITIS WITH SYMPTOMS > 10 DAYS: ICD-10-CM

## 2023-01-06 PROCEDURE — 99213 OFFICE O/P EST LOW 20 MIN: CPT | Mod: GT | Performed by: PHYSICIAN ASSISTANT

## 2023-01-06 RX ORDER — DOXYCYCLINE 100 MG/1
100 CAPSULE ORAL 2 TIMES DAILY
Qty: 20 CAPSULE | Refills: 0 | Status: SHIPPED | OUTPATIENT
Start: 2023-01-06 | End: 2023-01-16

## 2023-01-06 NOTE — PROGRESS NOTES
Serjio is a 42 year old who is being evaluated via a billable video visit.      How would you like to obtain your AVS? MyChart  If the video visit is dropped, the invitation should be resent by: Text to cell phone: [phone]  Will anyone else be joining your video visit? No          Assessment & Plan     Acute bronchitis with symptoms > 10 days    - doxycycline hyclate (VIBRAMYCIN) 100 MG capsule; Take 1 capsule (100 mg) by mouth 2 times daily for 10 days    Acute sinusitis with symptoms > 10 days    - doxycycline hyclate (VIBRAMYCIN) 100 MG capsule; Take 1 capsule (100 mg) by mouth 2 times daily for 10 days                 Return in about 2 weeks (around 1/20/2023) for recheck if not improving, regular primary provider.    Nicole Almonte PA-C  [facility]    Dameon Bassett is a 42 year old, presenting for the following health issues:  URI      HPI     Acute Illness  Acute illness concerns: uri   Onset/Duration: 10 days   Symptoms:  Fever: No  Chills/Sweats: No  Headache (location?): YES  Sinus Pressure: YES  Conjunctivitis:  No  Ear Pain: no   Pressure not painful   Rhinorrhea: YES  Congestion: YES  Sore Throat: No  Cough: YES-productive of green sputum  Wheeze: No  Decreased Appetite: No  Nausea: No  Vomiting: No  Diarrhea: No  Dysuria/Freq.: No  Dysuria or Hematuria: No  Fatigue/Achiness: YES  Sick/Strep Exposure: trip to  about 2-3 weeks ago   Sick contacts on the plane   Therapies tried and outcome: cold and flu tablet  Mucinex     covid tests x 2 negative  Non smoker          Review of Systems         Objective           Vitals:  No vitals were obtained today due to virtual visit.    Physical Exam   GENERAL: Healthy, alert and no distress  EYES: Eyes grossly normal to inspection.  No discharge or erythema, or obvious scleral/conjunctival abnormalities.  RESP: No audible wheeze, cough, or visible cyanosis.  No visible retractions or increased work of breathing.     SKIN: Visible skin clear. No significant rash, abnormal pigmentation or lesions.  NEURO: Cranial nerves grossly intact.  Mentation and speech appropriate for age.  PSYCH: Mentation appears normal, affect normal/bright, judgement and insight intact, normal speech and appearance well-groomed.                Video-Visit Details    Type of service:  Video Visit   Start: 8:17 am  End 8:26 AM      Originating Location (pt. Location): Home    Distant Location (provider location):  Off-site  Platform used for Video Visit: [employer] (powering [employer])

## 2023-01-13 ENCOUNTER — VIRTUAL VISIT (OUTPATIENT)
Dept: RHEUMATOLOGY | Facility: CLINIC | Age: 43
End: 2023-01-13
Attending: INTERNAL MEDICINE
Payer: COMMERCIAL

## 2023-01-13 DIAGNOSIS — I77.6 AORTITIS (H): ICD-10-CM

## 2023-01-13 DIAGNOSIS — M35.2 BEHCET'S DISEASE (H): Primary | ICD-10-CM

## 2023-01-13 PROCEDURE — 99213 OFFICE O/P EST LOW 20 MIN: CPT | Mod: GT | Performed by: INTERNAL MEDICINE

## 2023-01-13 RX ORDER — PREDNISONE 10 MG/1
TABLET ORAL
Qty: 47 TABLET | Refills: 0 | Status: SHIPPED | OUTPATIENT
Start: 2023-01-13 | End: 2023-01-30

## 2023-01-13 NOTE — LETTER
1/13/2023       RE: Serjio Farris  [address]  [facility] [address]     Dear Colleague,    Thank you for referring your patient, Serjio Farris, to the [facility] [place] at [facility]. Please see a copy of my visit note below.    Serjio is a 42 year old who is being evaluated via a billable video visit.      How would you like to obtain your AVS? MyChart  If the video visit is dropped, the invitation should be resent by: Text to cell phone: [phone]  Will anyone else be joining your video visit? No      Vonda Buchanan on 1/13/2023 at 2:52 PM   Video-Visit Details  Type of service:  Video Visit   Originating Location (pt. Location): Home    Distant Location (provider location):  off site  Platform used for Video Visit: [employer]   Video visit start time 3:05:13 pm.  Video visit end maria r 3:20:34 pm.  Jimmy Vann MD  Rheumatology    [facility] Outpatient Rheumatology Follow-up  Date of service: 1/13/23    Patient name: Serjio Farris  YOB: 1980  MRN: [id number]    Reason for follow-up: [place]    HPI:  Interval history 1/13/23  -middle of dec had very mild flare of 1-2 oral lesions  -at same time mild GI symptoms consistent with prior flares  -otherwise his disease has been more quiet/no other new/evolving symptoms  -has been on colchicine BID and the above flare was more mild in re: to severity and resolved more quickly  -last dose of prednisone was last week for his recent flare. He was started on prednisone taper for his flare (starting at 50mg daily and decreasing by 10mg every 5 days). Tolerated prednisone without noticeable side effects.   -No GI upset/diarrhea with colchicine  -No interval infections/new or progressive rash/fevers/chills/night sweats  -No interval progressive occular symptoms.   14 point ROS collected and otherwise negative.      Interval history:  10/21/22  -2-3 GI flares (took  "naproxen which resolves them in 2-3 days). Reports that all episodes were associated with blood in his stool  -1 oral flare of ulcers.  -No genital ulcers/eye symptoms since his last visit  -Feels that his flares (specific oral ulcers) have been milder while being on steroids. Currently on 17.5mg of prednisone. Has noticed increased appetite. Initially some trouble sleeping in the first 1-2 weeks, then no issues. Has developed upper acne on back/neck/chest/arms with steroids. No other rashes  -No interval infections.     14 point review of systems collected and negative if not documented above.    HPI from initial consultation  At age 22 would develop 5-6 ulcers back of throat last for 10 days or so. 1-2 times per month. Severely painful. Would try mouthwashes/losanges. Nothing was much helpful. Difficult to eat during these periods. These have continued. Then at age [age], woke up with droopy face on the left. Was told he had a \"mini stroke\"/ TIA. No stroke found on MRI. Symptoms resolved after about 24 hours.     Once he met his wife, has had numerous stomach issues. Has had multiple EGDs/Colonoscopies. Multiple bloody stools. Has had this ongoing since 2009/2010. Was on mezavant XL. Would be weak and fatigued during these episodes. Flares would last for days. [place] came [place] the [place] in [place] this therapy was stopped. Did food allergy testing. Started on FODMAP diet. He still has GI flares about once per month. Lasting for about 5 days or so. Stool can have bright red blood or be black/tarry.    In the last year has had 2 episodes of genital ulcers. 8-12 oral ulcers. He has found that the flares of his oral ulcers and GI flares with blood occur at the same time, though the genital ulcers are independent. He also has developed nasal ulcers which correspond to his oral/GI ulcers. Has had thorough infectious work-up with his recurrent oral/nasal/genital ulcers all of which has been unrevealing.     3 days prior to " going to the ED last month and what ultimately prompted referral to rheumatology today, he reports he had a typical evening. Went to bed. Woke up around midnight with epigastric pain. Severe/unbearable. Was brought to the ED and eval done at that time included CT abdomen/ pelvis with contrast which showed minor soft tissue density and fat stranding around the origin of the celial artery, right hepatic artery, and to a lesser extent the superior mesenteric artery. He was started on prednisone 80mg once daily with a taper over 2 months. He is currently on 40mg once daily today. His abdominal symptoms have resolved. Since starting prednisone he has not had interval oral/genital/GI bleeding.     No history of occular inflammation. Does have back pain/CMC/greater MTP pain.     Has pain in the bottom of his back, bilateral lumbar spine. Worst in the AM before he gets up out of bed. He has alternating between left/right at times. It has woken him up in the second half of the night.     No symptoms consistent with dactylitis.     Has a history of bradycardia. No history of DVT/PE. No symptoms consistent with raynauds.     Over the last few months he has noticed that he has been diaphoretic. He has a normal temperature. During flares he does have fatigue/chills.     Has had aching/shaking of his left hand/arm. Ongoing. No wrist drop/foot drop     14 point ROS collected and negative if not documented above.     Past Medical History:  Past Medical History:   Diagnosis Date     Depressive disorder 2012/2013    Due to my stomach issues and not being sorted. OK now     Stroke (H) age 25 approx    possible     Past Surgical History:  Past Surgical History:   Procedure Laterality Date     COLONOSCOPY N/A 04/27/2015    Procedure: COLONOSCOPY;  Surgeon: Pako Iqbal MD;  Location: [facility] OR     COLONOSCOPY WITH CO2 INSUFFLATION N/A 04/27/2015    Procedure: COLONOSCOPY WITH CO2 INSUFFLATION;  Surgeon: Pako Ibqal MD;  Location:  [facility] OR     ESOPHAGOSCOPY, GASTROSCOPY, DUODENOSCOPY (EGD), COMBINED Left 05/28/2015    Procedure: COMBINED ESOPHAGOSCOPY, GASTROSCOPY, DUODENOSCOPY (EGD), BIOPSY SINGLE OR MULTIPLE;  Surgeon: Pako Iqbal MD;  Location: [facility] GI     HC BREATH HYDROGEN TEST N/A 09/11/2015    Procedure: HYDROGEN BREATH TEST;  Surgeon: Pako Iqbal MD;  Location:  GI     HC TOOTH EXTRACTION W/FORCEP      age 15     testicular torsion  2010     UROLOGY PROCEDURE                         DATE:  01/01/2012    Testicular Torsion     Medications:  Current Outpatient Medications   Medication     doxycycline hyclate (VIBRAMYCIN) 100 MG capsule     gabapentin (NEURONTIN) 300 MG capsule     MITIGARE 0.6 MG capsule     Multiple Vitamin (MULTIVITAMIN ADULT PO)     predniSONE (DELTASONE) 10 MG tablet     sertraline (ZOLOFT) 50 MG tablet     triamcinolone (KENALOG) 0.1 % paste     No current facility-administered medications for this visit.   Currently on 40mg of prednisone daily    Allergies:  Allergies   Allergen Reactions     Ibuprofen GI Disturbance     Family history:  No family history of autoimmune disease, though sister does have oral ulcers.     Social History:  , no children  ETOH: rare  Smoking: Quit smoking in 2014  Drug use: No IVDU  Occupation: Works for [employer]    Objective:  There were no vitals taken for this visit.  No vitals for this video visit. Vitals reviewed in Epic.  GEN: Sitting up unassisted on couch. NAD.   HEENT: no facial rash, sclera clear, no inflammatory nose/ external ear changes  Pulm: speaking in full sentences, no cough, no audible wheezing, no use of accessory muscles  Skin: no acute cutaneous lesions by video exam today of exposed skin  MSK: full active ROM of bilateral wrists, MCPs, PIPs, DIPs. Can make full fist without difficulty. No erythema or edema of these joints by video exam      WBC   Date Value Ref Range Status   02/25/2020 6.2 4.0 - 11.0 10e9/L Final     WBC Count   Date Value Ref  Range Status   09/14/2022 10.2 4.0 - 11.0 10e3/uL Final     Hemoglobin   Date Value Ref Range Status   09/14/2022 15.8 13.3 - 17.7 g/dL Final   02/25/2020 14.7 13.3 - 17.7 g/dL Final     Platelet Count   Date Value Ref Range Status   09/14/2022 212 150 - 450 10e3/uL Final   02/25/2020 152 150 - 450 10e9/L Final     Creatinine   Date Value Ref Range Status   09/14/2022 0.94 0.67 - 1.17 mg/dL Final   02/25/2020 1.03 0.66 - 1.25 mg/dL Final     Lab Results   Component Value Date    ALKPHOS 66 06/17/2022    ALKPHOS 70 02/25/2020     AST   Date Value Ref Range Status   09/14/2022 17 10 - 50 U/L Final   02/25/2020 14 0 - 45 U/L Final     Lab Results   Component Value Date    ALT 21 06/17/2022    ALT 19 02/25/2020     Sed Rate   Date Value Ref Range Status   04/07/2015 7 0 - 15 mm/h Final     Erythrocyte Sedimentation Rate   Date Value Ref Range Status   09/14/2022 7 0 - 15 mm/hr Final   /  CRP Inflammation   Date Value Ref Range Status   09/14/2022 <3.00 <5.00 mg/L Final   04/07/2015 <2.9 0.0 - 8.0 mg/L Final     UA RESULTS:  No results for input(s): COLOR, APPEARANCE, URINEGLC, URINEBILI, URINEKETONE, SG, UBLD, URINEPH, PROTEIN, UROBILINOGEN, NITRITE, LEUKEST, RBCU, WBCU in the last [id number] hours.   MARVIN Screen by EIA   Date Value Ref Range Status   05/05/2015 <1.0  Interpretation:  Negative   <1.0 Final     DNA (ds) Antibody   Date Value Ref Range Status   09/14/2022 0.6 <10.0 IU/mL Final     Comment:     Negative     RNP Antibody IgG   Date Value Ref Range Status   09/14/2022 Negative Negative Final     Rheumatoid Factor   Date Value Ref Range Status   09/14/2022 <6 <12 IU/mL Final   05/05/2015 <20 <20 IU/mL Final     Cyclic Citrullinated Peptide Antibody IgG   Date Value Ref Range Status   09/14/2022 <0.4 <7.0 U/mL Final     Comment:     Negative     Myeloperoxidase Antibody IgG   Date Value Ref Range Status   09/14/2022 Negative Negative Final     Proteinase 3 Antibody IgG   Date Value Ref Range Status   09/14/2022  Negative Negative Final     Imaging  8/24/22  CT abdomen and pelvis with contrast at [facility] available in care everywhere  FINDINGS:   Lung bases clear     Small cyst left hepatic lobe near the dome.  A few other tiny cystic lesions   too small to characterize though likely also small cysts.     Unremarkable gallbladder     Unremarkable spleen and adrenal glands.     In normal pancreas.     Normal perfusion of kidneys.  Unremarkable bladder     No inflamed or obstructed bowel.  No significant atherosclerotic   calcifications.  Proximal portion of the major midline vessels patent.     Minor ill-defined soft tissue density around the celiac artery, origin of the   superior mesenteric artery and around the proximal segment of the hepatic   arteries.  Mild narrowing at the origin of the celiac artery.     No enlarged lymph nodes.     Para-aortic fat appeared normal without findings of adenopathy or   retroperitoneal fibrosis around the aorta.  Negative for retroperitoneal mass.    Impression  1. Minor soft tissue density and fat stranding around origin of celiac artery,   right hepatic artery and to lesser extent superior mesenteric artery.  Some   component of retroperitoneal fibrosis, vasculitis or less likely   lymphoproliferative malignancy are differentials.  The mild narrowing of celiac   artery with slight inferior displacement at origin less likely to reflect a   component of median arcuate ligament syndrome.   2. Negative for adenopathy   3. Negative for findings of retroperitoneal fibrosis along aorta margins   4. No significant discrepancy with preliminary   5.  CODE 3:  Report contains unexpected findings requiring further evaluation   or follow-up.    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     COMPARISON: February 25, 2020     TECHNIQUE: Multiplanar multiformatted CTA images were obtained from  the lung apices through the  chest, abdomen, and pelvis after the  uneventful administration of Isovue-370 intravenous contrast given for  a total of 80 mL. Radiation dose for this scan was reduced using  automated exposure control, adjustment of the mA and/or kV according  to patient size, or iterative reconstruction technique. Three-D  reformatted images were created at a separate workstation.     FINDINGS: The visible thyroid gland is unremarkable. No abnormally  enlarged mediastinal lymph nodes. Heart size is normal. No acute  osseous abnormality. No pulmonary masses.     The gallbladder is contracted. The liver has a small hypodensity in  the left hepatic lobe, measuring 1 cm in size, present on previous  exam, perhaps minimally increased since that time. The spleen, adrenal  glands, and pancreas are unremarkable. Both kidneys are normally  perfused. No hydronephrosis or nephrolithiasis. No intraperitoneal  fluid or air. The bladder is mostly decompressed. Left hydrocele is  partially visible. No dilated loops of bowel.     The ascending thoracic aorta is 2.6 x 2.7 cm. The aortic arch is 2 cm.  Origins of the great arteries are widely patent. Descending thoracic  aorta is normal in size and appearance. The celiac axis, SMA,  bilateral renal, and inferior mesenteric arteries are patent. The  abdominal aorta is normal in size and appearance. No wall thickening  or surrounding inflammatory change. Both common iliac, internal iliac,  external iliac, and common femoral arteries are patent.                                                                      IMPRESSION:  1. Patent thoracoabdominal aorta. No aneurysmal dilatation or  suggestion of inflammation. No stenosis.  2. Visceral arteries are patent and appear unremarkable.  3. Tiny hypodensity in the left hepatic lobe is 1 cm, minimally  increased in size from previous exam.    A/P  42 year old male was referred to rheumatology for evaluation of vasculitis in the setting of a recent ED  "evaluation on 8/24/22 for severe abdominal pain during which a CT abdomen/pelvis with contrast was obtained and showed minor soft tissue density and fat stranding around the origin of the celiac artery, right hepatic artery and to a lesser extent superior mesenteric artery.     His history is notable for  1. Recurrent oral ulcerations: 5-12 at one time. Occur at least monthly. Infectious work-up negative. Started around age 22  2. Recurrent nasal ulcerations: occur at the same time as his oral ulcers  3. 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 Has had scarring on his scrotum due to prior lesions  4. Recurrent abdominal pain associated with bloody stool (sometimes bright red sometimes black/tarry): occurs at the same time as his oral/nasal ulcers.  5. Has a history of what sounds like a TIA. Was told it was a \"mini stroke\". This was in the [place] prior to him moving to the . No records are available to review in our system.     No evidence of cutaneous lesions or occular inflammation by history/exam/review of the record.     Taken together I did have concern for vasculitis and more specifically behcets given his clinical history/findings. Additional work-up to ensure no other process was driving these findings was pursued and included  CBC, CMP, ESR, CRP, C3, C4, cryo, MARVIN, SSA, SSB, Smith, RNP, RF, CCP, ANCA, MPO, PR3, IgG subclasses, treponema with reflex, Hep B/C/HIV/quant gold, ACE, 1,25 dihydroxyvitamin D all of which was negative/normal. I also obtained a TPMT enzyme which was normal.  He presented to me on prednisone 40mg daily which was started in the ED and taper plan established: 9/7- 9/20 40mg daily then 9/21- 10/4 30mg daily then 10/5-10/18 20 mg daily then 10/19-11/1 17.5 mg daily then 11/2-11/15  15 mg daily then 11/16-11/29 12.5mg daily then  11/30 start 10mg daily then 7.5mg daily x2 weeks then 5mg daily x2 weeks then 2.5mg daily x2 weeks then stop. We obtained repeat " imaging on 10/5/22 with CTA chest abdomen pelvis which showed complete resolution of the previously noted inflammatory findings concerning for vasculitis. He was started on colchicine 0.6mg BID and he successfully tapered off of prednisone. Though has had interval flare of vasculitis requiring prednisone taper outlined below.     We will follow-up in about 12 weeks from now or sooner with new progressive symptoms.     PLAN  -continue colchicine 0.6mg BID  -labs in the next 1-2 weeks  -prednisone script ordered for flare, with decreasing dose q3 days instead of 5days as prior, to see if sufficient.   -Will let me know if he flares again and uses prednisone. If he does, will order/start apremilast.    Jimmy Vann MD  Rheumatology    I spent a total of 25 minutes on the date of service on chart review, patient encounter, , documentation.       Again, thank you for allowing me to participate in the care of your patient.      Sincerely,    Jimmy Vann MD

## 2023-01-13 NOTE — NURSING NOTE
Pt states there are no changes to meds since they were last reviewed on 1/6.     Vonda Buchanan on 1/13/2023 at 2:51 PM

## 2023-01-13 NOTE — LETTER
Date:February 8, 2023      Provider requested that no letter be sent. Do not send.       [facility]

## 2023-01-13 NOTE — PROGRESS NOTES
Serjio is a 42 year old who is being evaluated via a billable video visit.      How would you like to obtain your AVS? MyChart  If the video visit is dropped, the invitation should be resent by: Text to cell phone: [phone]  Will anyone else be joining your video visit? Kimber Buchanan on 1/13/2023 at 2:52 PM   Video-Visit Details  Type of service:  Video Visit   Originating Location (pt. Location): Home    Distant Location (provider location):  off site  Platform used for Video Visit: [employer]   Video visit start time 3:05:13 pm.  Video visit end maria r 3:20:34 pm.  Jimmy Vann MD  Rheumatology    [facility] Outpatient Rheumatology Follow-up  Date of service: 1/13/23    Patient name: Serjio Farris  YOB: 1980  MRN: [id number]    Reason for follow-up: Behcets    HPI:  Interval history 1/13/23  -middle of dec had very mild flare of 1-2 oral lesions  -at same time mild GI symptoms consistent with prior flares  -otherwise his disease has been more quiet/no other new/evolving symptoms  -has been on colchicine BID and the above flare was more mild in re: to severity and resolved more quickly  -last dose of prednisone was last week for his recent flare. He was started on prednisone taper for his flare (starting at 50mg daily and decreasing by 10mg every 5 days). Tolerated prednisone without noticeable side effects.   -No GI upset/diarrhea with colchicine  -No interval infections/new or progressive rash/fevers/chills/night sweats  -No interval progressive occular symptoms.   14 point ROS collected and otherwise negative.      Interval history:  10/21/22  -2-3 GI flares (took naproxen which resolves them in 2-3 days). Reports that all episodes were associated with blood in his stool  -1 oral flare of ulcers.  -No genital ulcers/eye symptoms since his last visit  -Feels that his flares (specific oral ulcers) have been milder while being on steroids. Currently on 17.5mg of prednisone. Has  "noticed increased appetite. Initially some trouble sleeping in the first 1-2 weeks, then no issues. Has developed upper acne on back/neck/chest/arms with steroids. No other rashes  -No interval infections.     14 point review of systems collected and negative if not documented above.    HPI from initial consultation  At age 22 would develop 5-6 ulcers back of throat last for 10 days or so. 1-2 times per month. Severely painful. Would try mouthwashes/losanges. Nothing was much helpful. Difficult to eat during these periods. These have continued. Then at age [age], woke up with droopy face on the left. Was told he had a \"mini stroke\"/ TIA. No stroke found on MRI. Symptoms resolved after about 24 hours.     Once he met his wife, has had numerous stomach issues. Has had multiple EGDs/Colonoscopies. Multiple bloody stools. Has had this ongoing since 2009/2010. Was on mezavant XL. Would be weak and fatigued during these episodes. Flares would last for days. [place] in [place] this therapy was stopped. Did food allergy testing. Started on FODMAP diet. He still has GI flares about once per month. Lasting for about 5 days or so. Stool can have bright red blood or be black/tarry.    In the last year has had 2 episodes of genital ulcers. 8-12 oral ulcers. He has found that the flares of his oral ulcers and GI flares with blood occur at the same time, though the genital ulcers are independent. He also has developed nasal ulcers which correspond to his oral/GI ulcers. Has had thorough infectious work-up with his recurrent oral/nasal/genital ulcers all of which has been unrevealing.     3 days prior to going to the ED last month and what ultimately prompted referral to rheumatology today, he reports he had a typical evening. Went to bed. Woke up around midnight with epigastric pain. Severe/unbearable. Was brought to the ED and eval done at that time included CT abdomen/ pelvis with contrast which showed minor soft " tissue density and fat stranding around the origin of the celial artery, right hepatic artery, and to a lesser extent the superior mesenteric artery. He was started on prednisone 80mg once daily with a taper over 2 months. He is currently on 40mg once daily today. His abdominal symptoms have resolved. Since starting prednisone he has not had interval oral/genital/GI bleeding.     No history of occular inflammation. Does have back pain/CMC/greater MTP pain.     Has pain in the bottom of his back, bilateral lumbar spine. Worst in the AM before he gets up out of bed. He has alternating between left/right at times. It has woken him up in the second half of the night.     No symptoms consistent with dactylitis.     Has a history of bradycardia. No history of DVT/PE. No symptoms consistent with raynauds.     Over the last few months he has noticed that he has been diaphoretic. He has a normal temperature. During flares he does have fatigue/chills.     Has had aching/shaking of his left hand/arm. Ongoing. No wrist drop/foot drop     14 point ROS collected and negative if not documented above.     Past Medical History:  Past Medical History:   Diagnosis Date     Depressive disorder 2012/2013    Due to my stomach issues and not being sorted. OK now     Stroke (H) age 25 approx    possible     Past Surgical History:  Past Surgical History:   Procedure Laterality Date     COLONOSCOPY N/A 04/27/2015    Procedure: COLONOSCOPY;  Surgeon: Pako Iqbal MD;  Location:  OR     COLONOSCOPY WITH CO2 INSUFFLATION N/A 04/27/2015    Procedure: COLONOSCOPY WITH CO2 INSUFFLATION;  Surgeon: Pako Iqbal MD;  Location:  OR     ESOPHAGOSCOPY, GASTROSCOPY, DUODENOSCOPY (EGD), COMBINED Left 05/28/2015    Procedure: COMBINED ESOPHAGOSCOPY, GASTROSCOPY, DUODENOSCOPY (EGD), BIOPSY SINGLE OR MULTIPLE;  Surgeon: Pako Iqbal MD;  Location:  GI     HC BREATH HYDROGEN TEST N/A 09/11/2015    Procedure: HYDROGEN BREATH TEST;  Surgeon:  Pako Iqbal MD;  Location: [facility]     HC TOOTH EXTRACTION W/FORCEP      age 15     testicular torsion  2010     UROLOGY PROCEDURE                         DATE:  01/01/2012    Testicular Torsion     Medications:  Current Outpatient Medications   Medication     doxycycline hyclate (VIBRAMYCIN) 100 MG capsule     gabapentin (NEURONTIN) 300 MG capsule     MITIGARE 0.6 MG capsule     Multiple Vitamin (MULTIVITAMIN ADULT PO)     predniSONE (DELTASONE) 10 MG tablet     sertraline (ZOLOFT) 50 MG tablet     triamcinolone (KENALOG) 0.1 % paste     No current facility-administered medications for this visit.   Currently on 40mg of prednisone daily    Allergies:  Allergies   Allergen Reactions     Ibuprofen GI Disturbance     Family history:  No family history of autoimmune disease, though sister does have oral ulcers.     Social History:  , no children  ETOH: rare  Smoking: Quit smoking in 2014  Drug use: No IVDU  Occupation: Works for [employer]    Objective:  There were no vitals taken for this visit.  No vitals for this video visit. Vitals reviewed in Epic.  GEN: Sitting up unassisted on couch. NAD.   HEENT: no facial rash, sclera clear, no inflammatory nose/ external ear changes  Pulm: speaking in full sentences, no cough, no audible wheezing, no use of accessory muscles  Skin: no acute cutaneous lesions by video exam today of exposed skin  MSK: full active ROM of bilateral wrists, MCPs, PIPs, DIPs. Can make full fist without difficulty. No erythema or edema of these joints by video exam      WBC   Date Value Ref Range Status   02/25/2020 6.2 4.0 - 11.0 10e9/L Final     WBC Count   Date Value Ref Range Status   09/14/2022 10.2 4.0 - 11.0 10e3/uL Final     Hemoglobin   Date Value Ref Range Status   09/14/2022 15.8 13.3 - 17.7 g/dL Final   02/25/2020 14.7 13.3 - 17.7 g/dL Final     Platelet Count   Date Value Ref Range Status   09/14/2022 212 150 - 450 10e3/uL Final   02/25/2020 152 150 - 450 10e9/L Final      Creatinine   Date Value Ref Range Status   09/14/2022 0.94 0.67 - 1.17 mg/dL Final   02/25/2020 1.03 0.66 - 1.25 mg/dL Final     Lab Results   Component Value Date    ALKPHOS 66 06/17/2022    ALKPHOS 70 02/25/2020     AST   Date Value Ref Range Status   09/14/2022 17 10 - 50 U/L Final   02/25/2020 14 0 - 45 U/L Final     Lab Results   Component Value Date    ALT 21 06/17/2022    ALT 19 02/25/2020     Sed Rate   Date Value Ref Range Status   04/07/2015 7 0 - 15 mm/h Final     Erythrocyte Sedimentation Rate   Date Value Ref Range Status   09/14/2022 7 0 - 15 mm/hr Final   /  CRP Inflammation   Date Value Ref Range Status   09/14/2022 <3.00 <5.00 mg/L Final   04/07/2015 <2.9 0.0 - 8.0 mg/L Final     UA RESULTS:  No results for input(s): COLOR, APPEARANCE, URINEGLC, URINEBILI, URINEKETONE, SG, UBLD, URINEPH, PROTEIN, UROBILINOGEN, NITRITE, LEUKEST, RBCU, WBCU in the last [id number] hours.   MARVIN Screen by EIA   Date Value Ref Range Status   05/05/2015 <1.0  Interpretation:  Negative   <1.0 Final     DNA (ds) Antibody   Date Value Ref Range Status   09/14/2022 0.6 <10.0 IU/mL Final     Comment:     Negative     RNP Antibody IgG   Date Value Ref Range Status   09/14/2022 Negative Negative Final     Rheumatoid Factor   Date Value Ref Range Status   09/14/2022 <6 <12 IU/mL Final   05/05/2015 <20 <20 IU/mL Final     Cyclic Citrullinated Peptide Antibody IgG   Date Value Ref Range Status   09/14/2022 <0.4 <7.0 U/mL Final     Comment:     Negative     Myeloperoxidase Antibody IgG   Date Value Ref Range Status   09/14/2022 Negative Negative Final     Proteinase 3 Antibody IgG   Date Value Ref Range Status   09/14/2022 Negative Negative Final     Imaging  8/24/22  CT abdomen and pelvis with contrast at [facility] available in care everywhere  FINDINGS:   Lung bases clear     Small cyst left hepatic lobe near the dome.  A few other tiny cystic lesions   too small to characterize though likely also small cysts.     Unremarkable  gallbladder     Unremarkable spleen and adrenal glands.     In normal pancreas.     Normal perfusion of kidneys.  Unremarkable bladder     No inflamed or obstructed bowel.  No significant atherosclerotic   calcifications.  Proximal portion of the major midline vessels patent.     Minor ill-defined soft tissue density around the celiac artery, origin of the   superior mesenteric artery and around the proximal segment of the hepatic   arteries.  Mild narrowing at the origin of the celiac artery.     No enlarged lymph nodes.     Para-aortic fat appeared normal without findings of adenopathy or   retroperitoneal fibrosis around the aorta.  Negative for retroperitoneal mass.    Impression  1. Minor soft tissue density and fat stranding around origin of celiac artery,   right hepatic artery and to lesser extent superior mesenteric artery.  Some   component of retroperitoneal fibrosis, vasculitis or less likely   lymphoproliferative malignancy are differentials.  The mild narrowing of celiac   artery with slight inferior displacement at origin less likely to reflect a   component of median arcuate ligament syndrome.   2. Negative for adenopathy   3. Negative for findings of retroperitoneal fibrosis along aorta margins   4. No significant discrepancy with preliminary   5.  CODE 3:  Report contains unexpected findings requiring further evaluation   or follow-up.    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     COMPARISON: February 25, 2020     TECHNIQUE: Multiplanar multiformatted CTA images were obtained from  the lung apices through the chest, abdomen, and pelvis after the  uneventful administration of Isovue-370 intravenous contrast given for  a total of 80 mL. Radiation dose for this scan was reduced using  automated exposure control, adjustment of the mA and/or kV according  to patient size, or iterative reconstruction technique.  Three-D  reformatted images were created at a separate workstation.     FINDINGS: The visible thyroid gland is unremarkable. No abnormally  enlarged mediastinal lymph nodes. Heart size is normal. No acute  osseous abnormality. No pulmonary masses.     The gallbladder is contracted. The liver has a small hypodensity in  the left hepatic lobe, measuring 1 cm in size, present on previous  exam, perhaps minimally increased since that time. The spleen, adrenal  glands, and pancreas are unremarkable. Both kidneys are normally  perfused. No hydronephrosis or nephrolithiasis. No intraperitoneal  fluid or air. The bladder is mostly decompressed. Left hydrocele is  partially visible. No dilated loops of bowel.     The ascending thoracic aorta is 2.6 x 2.7 cm. The aortic arch is 2 cm.  Origins of the great arteries are widely patent. Descending thoracic  aorta is normal in size and appearance. The celiac axis, SMA,  bilateral renal, and inferior mesenteric arteries are patent. The  abdominal aorta is normal in size and appearance. No wall thickening  or surrounding inflammatory change. Both common iliac, internal iliac,  external iliac, and common femoral arteries are patent.                                                                      IMPRESSION:  1. Patent thoracoabdominal aorta. No aneurysmal dilatation or  suggestion of inflammation. No stenosis.  2. Visceral arteries are patent and appear unremarkable.  3. Tiny hypodensity in the left hepatic lobe is 1 cm, minimally  increased in size from previous exam.    A/P  42 year old male was referred to rheumatology for evaluation of vasculitis in the setting of a recent ED evaluation on 8/24/22 for severe abdominal pain during which a CT abdomen/pelvis with contrast was obtained and showed minor soft tissue density and fat stranding around the origin of the celiac artery, right hepatic artery and to a lesser extent superior mesenteric artery.     His history is notable  "for  1. Recurrent oral ulcerations: 5-12 at one time. Occur at least monthly. Infectious work-up negative. Started around age 22  2. Recurrent nasal ulcerations: occur at the same time as his oral ulcers  3. 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 Has had scarring on his scrotum due to prior lesions  4. Recurrent abdominal pain associated with bloody stool (sometimes bright red sometimes black/tarry): occurs at the same time as his oral/nasal ulcers.  5. Has a history of what sounds like a TIA. Was told it was a \"mini stroke\". This was in the [place]. No records are available to review in our system.     No evidence of cutaneous lesions or occular inflammation by history/exam/review of the record.     Taken together I did have concern for vasculitis and more specifically behcets given his clinical history/findings. Additional work-up to ensure no other process was driving these findings was pursued and included  CBC, CMP, ESR, CRP, C3, C4, cryo, MARVIN, SSA, SSB, Smith, RNP, RF, CCP, ANCA, MPO, PR3, IgG subclasses, treponema with reflex, Hep B/C/HIV/quant gold, ACE, 1,25 dihydroxyvitamin D all of which was negative/normal. I also obtained a TPMT enzyme which was normal.  He presented to me on prednisone 40mg daily which was started in the ED and taper plan established: 9/7- 9/20 40mg daily then 9/21- 10/4 30mg daily then 10/5-10/18 20 mg daily then 10/19-11/1 17.5 mg daily then 11/2-11/15  15 mg daily then 11/16-11/29 12.5mg daily then  11/30 start 10mg daily then 7.5mg daily x2 weeks then 5mg daily x2 weeks then 2.5mg daily x2 weeks then stop. We obtained repeat imaging on 10/5/22 with CTA chest abdomen pelvis which showed complete resolution of the previously noted inflammatory findings concerning for vasculitis. He was started on colchicine 0.6mg BID and he successfully tapered off of prednisone. Though has had interval flare of vasculitis requiring prednisone " taper outlined below.     We will follow-up in about 12 weeks from now or sooner with new progressive symptoms.     PLAN  -continue colchicine 0.6mg BID  -labs in the next 1-2 weeks  -prednisone script ordered for flare, with decreasing dose q3 days instead of 5days as prior, to see if sufficient.   -Will let me know if he flares again and uses prednisone. If he does, will order/start apremilast.    Jimmy Vann MD  Rheumatology    I spent a total of 25 minutes on the date of service on chart review, patient encounter, , documentation.

## 2023-01-16 ENCOUNTER — TELEPHONE (OUTPATIENT)
Dept: RHEUMATOLOGY | Facility: CLINIC | Age: 43
End: 2023-01-16

## 2023-01-16 NOTE — TELEPHONE ENCOUNTER
[facility] to schedule 12 wk follow up and labs with pt.  Left clinic # to schedule at his convenience.

## 2023-01-20 ENCOUNTER — MYC MEDICAL ADVICE (OUTPATIENT)
Dept: RHEUMATOLOGY | Facility: CLINIC | Age: 43
End: 2023-01-20
Payer: COMMERCIAL

## 2023-01-20 DIAGNOSIS — M35.2 BEHCET'S DISEASE (H): ICD-10-CM

## 2023-01-26 ENCOUNTER — E-VISIT (OUTPATIENT)
Dept: INTERNAL MEDICINE | Facility: CLINIC | Age: 43
End: 2023-01-26
Payer: COMMERCIAL

## 2023-01-26 DIAGNOSIS — Z53.9 ERRONEOUS ENCOUNTER--DISREGARD: Primary | ICD-10-CM

## 2023-01-27 ENCOUNTER — E-VISIT (OUTPATIENT)
Dept: FAMILY MEDICINE | Facility: CLINIC | Age: 43
End: 2023-01-27
Payer: COMMERCIAL

## 2023-01-27 DIAGNOSIS — R41.840 POOR CONCENTRATION: Primary | ICD-10-CM

## 2023-01-27 PROCEDURE — 99421 OL DIG E/M SVC 5-10 MIN: CPT | Performed by: FAMILY MEDICINE

## 2023-01-27 NOTE — TELEPHONE ENCOUNTER
Patient updating he had a flare of symptoms including mouth sores, sore back and joints, abdominal discomfort all starting around Jan 19.    After 3-4 days w/ symptoms persisting he started the prednisone taper and states symptoms are gone, except mouth ulcers but they are much less painful and almost gone.    Per review of last plan, patient has followed instructions.  Update to Dr. Vann and for order of apremalist.    Haley Brink RN  Rheumatology Clinic

## 2023-01-27 NOTE — TELEPHONE ENCOUNTER
Provider E-Visit time total (minutes): 6 minutes    Taylor Oneal MD, [facility]

## 2023-01-30 ENCOUNTER — TELEPHONE (OUTPATIENT)
Dept: RHEUMATOLOGY | Facility: CLINIC | Age: 43
End: 2023-01-30
Payer: COMMERCIAL

## 2023-01-30 RX ORDER — PREDNISONE 10 MG/1
TABLET ORAL
Qty: 47 TABLET | Refills: 0 | Status: SHIPPED | OUTPATIENT
Start: 2023-01-30 | End: 2023-02-12

## 2023-01-30 NOTE — TELEPHONE ENCOUNTER
Ran test claim on Otezla and Insurance has preferred meds Cimzia, Humira, Simponi, or Stelara.  Is pt contraindicated for any of those alternatives?

## 2023-01-30 NOTE — TELEPHONE ENCOUNTER
PA Initiation    Medication: Otezla PA  Insurance Company: [employer] ([facility]) - Phone [phone] Fax [phone]  Pharmacy Filling the Rx:    Filling Pharmacy Phone:    Filling Pharmacy Fax:    Start Date: 1/30/2023    Key: [id number]

## 2023-01-30 NOTE — CONFIDENTIAL NOTE
Per Dr. Vann's message:    1) please refill his prednisone that I ordered on 1/13 so he can have it on hand should he flare again     2) please order apremilast for behcets. There are starter and maintenance doses with the following instructions:   Oral: Initial: 10 mg in the morning on day 1. Titrate upward by additional 10 mg per day on days 2 to 5 as follows: Day 2: 10 mg twice daily; Day 3: 10 mg in the morning and 20 mg in the evening; Day 4: 20 mg twice daily; Day 5: 20 mg in the morning and 30 mg in the evening. Maintenance dose: 30 mg twice daily starting on day 6.

## 2023-01-30 NOTE — TELEPHONE ENCOUNTER
Prior Authorization Approval    Authorization Effective Date: 1/30/2023  Authorization Expiration Date: 1/30/2024  Medication: Otezla PA  Approved Dose/Quantity: otezla starter and maintenance  Reference #: Key: [id number]   Insurance Company: [employer] ([facility]) - Phone [phone] Fax [phone]  Expected CoPay: $150.00     CoPay Card Available: Yes     Foundation Assistance Needed: [email or web address]://[email or web address]/supportplus/[email or web address]-cost-and-copay or [phone]  Which Pharmacy is filling the prescription (Not needed for infusion/clinic administered): [facility] - [place], [place] - [address]  Pharmacy Notified: Yes  Patient Notified: Yes

## 2023-02-12 ENCOUNTER — OFFICE VISIT (OUTPATIENT)
Dept: URGENT CARE | Facility: URGENT CARE | Age: 43
End: 2023-02-12
Payer: COMMERCIAL

## 2023-02-12 VITALS
DIASTOLIC BLOOD PRESSURE: 74 MMHG | HEART RATE: 80 BPM | OXYGEN SATURATION: 97 % | TEMPERATURE: 97.3 F | SYSTOLIC BLOOD PRESSURE: 126 MMHG

## 2023-02-12 DIAGNOSIS — M54.2 NECK PAIN: ICD-10-CM

## 2023-02-12 DIAGNOSIS — M62.838 NECK MUSCLE SPASM: Primary | ICD-10-CM

## 2023-02-12 PROCEDURE — 99214 OFFICE O/P EST MOD 30 MIN: CPT | Performed by: FAMILY MEDICINE

## 2023-02-12 RX ORDER — CYCLOBENZAPRINE HCL 10 MG
10 TABLET ORAL 3 TIMES DAILY PRN
Qty: 21 TABLET | Refills: 0 | Status: SHIPPED | OUTPATIENT
Start: 2023-02-12 | End: 2023-06-01

## 2023-02-12 ASSESSMENT — PAIN SCALES - GENERAL: PAINLEVEL: SEVERE PAIN (7)

## 2023-02-12 NOTE — LETTER
[facility] [place]  [address]  [address]  [place] [id number] [phone]      February 12, 2023    RE:  Serjio Farris                                                                                                                                                       [address] [address]            To whom it may concern:    Serjio Farris is under my professional care at the [facility] on February 12, 2023, He has a left-sided posterior neck muscle spasm.  Because of the severe pain, please excuse his absence from work on February 13, 2023. ,  .He  may return to work once his neck starts to feel better.            Sincerely,        Charly Buckner MD    [facility]

## 2023-02-13 NOTE — PROGRESS NOTES
SUBJECTIVE:   Serjio Farris is a 42 year old male presenting with a chief complaint of awakening with Left posterior neck pain (pain ratin out of 10).  .Patient had slept on his right side last night. No new pillows.  There has been shooting pain at the left upper back and near the left upper shoulder blade.  No shooting pain down the left arm/left hand. No weakness/numbness in the left arm/left hand.    Treatment measures tried include a year-old Flexeril pill without any pain relief.  Jose-Thao did not help.  Patient tried Paracetamoll with Codeine at 2:30 pm today without much pain relief.  Patient has applied ice with partial relief of the pain.      ..    Past Medical History:   Diagnosis Date     Depressive disorder     Due to my stomach issues and not being sorted. OK now     Stroke (H) age 25 approx    possible   Patient had a stroke in his 20s that caused problems with the face and left arm.      Current Outpatient Medications   Medication Sig Dispense Refill     Apremilast (OTEZLA) 10 & 20 & 30 MG TBPK 10 mg in the morning on day 1. Day 2: 10 mg twice daily; Day 3: 10 mg in the morning and 20 mg in the evening; Day 4: 20 mg twice daily; Day 5: 20 mg in the morning and 30 mg in the evening 1 each 0     apremilast (OTEZLA) 30 MG tablet Take 1 tablet (30 mg) by mouth 2 times daily Hold for signs of infection, and seek medical attention. 60 tablet 5     gabapentin (NEURONTIN) 300 MG capsule Take 1 capsule (300 mg) by mouth At Bedtime 90 capsule 0     MITIGARE 0.6 MG capsule Take 1 capsule (0.6 mg) by mouth 2 times daily 60 capsule 5     predniSONE (DELTASONE) 10 MG tablet take 50 mg (5 tabs) for 5 days; 40 mg (4 tabs) for 5 days; 30 mg (3tabs) for 5 days; 20 mg (2 tabs)  for 5 days; 10 mg (1 tab) for 5 days; then stop. 75 tablet 1     Multiple Vitamin (MULTIVITAMIN ADULT PO)        sertraline (ZOLOFT) 50 MG tablet Take 1 tablet (50 mg) by mouth daily 90 tablet 1     triamcinolone (KENALOG) 0.1  % paste Take by mouth 2 times daily 5 g 1     Social History     Tobacco Use     Smoking status: Former     Packs/day: 0.10     Years: 10.00     Pack years: 1.00     Types: Cigarettes     Start date: 1999     Quit date: 2014     Years since quittin.4     Smokeless tobacco: Never     Tobacco comments:     3 cigs day 13 years   Substance Use Topics     Alcohol use: Yes     Comment: Extremely rarely (major occassasions)       ROS:  CONSTITUTIONAL:negative for fevers.    MUSCULOSKELETAL:  Positive for neck pain,  NEURO:  Negative for weakness/numbness.      OBJECTIVE:  /74   Pulse 80   Temp 97.3  F (36.3  C) (Tympanic)   SpO2 97%   GENERAL APPEARANCE: healthy, alert and in pain.    NECK: no tenderness over the spinous processes of the cervical spine.  There are spasm and tenderness over the muscles of the left posterior neck.    There is increased pain and decreased ROM, especially when turning the neck to the left and when looking up.    BACK: There is spasm and tenderness over the muscles of the left trapezius muscle and left rhomboid muscles  NEURO: Normal strength and tone, sensory exam grossly normal, mentation intact, speech normal, normal strength throughout the left arm and left hand (normal function of the radial, median and ulnar nerves).    SKIN: no cyanosis at the left arm and left hand.      ASSESSMENT:  Left posterior neck spasm and pain, most likely due to irregular sleeping position last night.      PLAN:  Rx:  Flexeril  Rx:  Tylenol #3 (don't take this medication if driving or if drinking alcohol).    Neck exercises.     Place warmth onto the painful areas of the left neck and upper back for 20 minutes at a time, every 2 hours while awake     Sleep on a pillow that is not too high and not too low so that the spine of the neck is aligned with the rest of the spine.      Massage the neck if possible.      Follow up if not better in 7-10 days.      Charly Buckner MD

## 2023-02-14 NOTE — PROGRESS NOTES
Continued Stay SW/CM Assessment/Plan of Care Note     Discharge Destination: Home     Patient's discharge planning goal: Home  Discharge Services: home oxygen (with sleep)- [facility]  Prior Services: none  Baseline Level of Care: ambulates with walker, independent with ADLs, laundry, transportation and medication management; granddaughter assists with meals and cleaning  Transportation: son Cooper at discharge  SDOH Triggers: N/A  PCP: Jey Oliveros MD   DME: has walker  Advanced Directives:  Current document on file. First agent, Alex Enrique, .  COVID Test: N/A  IMM/MOON: IMM 23 at 1020  Important Phone Numbers:          Active Substitute Decision Maker (SDM)    There are no active Substitute Decision Maker (SDM) on file.           Progress note:  Chart reviewed, collaborated with team.    Pt from home with granddaughter and is independent with ADLs and some IADLs (granddaughter assists with some IADLs).     Per Pulmonology note: Nocturnal hypoxemia was demonstrated on overnight pulse oximetry. She will be prescribed supplemental oxygen in the meantime. Patient can be discharged today and go home after sleep study in a.m.    1020 Met with pt at bedside. Pt alert and oriented. Discussed discharge plan. Plan continues to be to return home via son at discharge; pt confirms this plan. Pt states her son is aware that she will need to be picked up tomorrow morning after sleep study overnight. Discussed home oxygen providers and offered choice. Pt prefers [facility] for home oxygen. Pt denies any additional discharge needs at this time and states she has support for her recovery.    1027 Secure chat sent to [facility] home care liaison regarding home oxygen. Will need home oxygen order placed.    1031 Secure chat sent to Dr. Eduardo informing that pt prefers [facility] for home oxygen. MD to place home oxygen order.      Confirmed home oxygen order is entered. Home care liaison aware.  Serjio is a 42 year old who is being evaluated via a billable video visit.      How would you like to obtain your AVS? MyChart  If the video visit is dropped, the invitation should be resent by: Text to cell phone: [phone]  Will anyone else be joining your video visit? No          Assessment & Plan     Moderate episode of recurrent major depressive disorder (H)  - improving with sertraline. Continue with current regimen   - sertraline (ZOLOFT) 50 MG tablet; Take 1 tablet (50 mg) by mouth daily    Generalized anxiety disorder  - improving  - sertraline (ZOLOFT) 50 MG tablet; Take 1 tablet (50 mg) by mouth daily         See Patient Instructions    No follow-ups on file.   Follow-up Visit   Expected date:  Nov 29, 2022 (Approximate)      Follow Up Appointment Details:     Follow-up with whom?: Me    Follow-Up for what?: Chronic Disease f/u    Chronic Disease f/u:  Depression  Anxiety       How?: In Person or Virtual                    Taylor Walsh MD  [facility]    Dameon Bassett is a 42 year old, presenting for the following health issues:  Depression and Anxiety      Anxiety    History of Present Illness       Mental Health Follow-up:  Patient presents to follow-up on Depression & Anxiety.Patient's depression since last visit has been:  Better  The patient is not having other symptoms associated with depression.  Patient's anxiety since last visit has been:  Good  The patient is not having other symptoms associated with anxiety.  Any significant life events: grief or loss  Patient is feeling anxious or having panic attacks.  Patient has no concerns about alcohol or drug use.    He eats 2-3 servings of fruits and vegetables daily.He consumes 1 sweetened beverage(s) daily.He exercises with enough effort to increase his heart rate 20 to 29 minutes per day.  He exercises with enough effort to increase his heart rate 5 days per week.   He is taking medications  "regularly.    Today's PHQ-9         PHQ-9 Total Score: 12    PHQ-9 Q9 Thoughts of better off dead/self-harm past 2 weeks :   Not at all    How difficult have these problems made it for you to do your work, take care of things at home, or get along with other people: Somewhat difficult  Today's WILMAR-7 Score: 11     Patient reports improvement in mood since starting on sertraline. He was seeing a therapist however the provider recently moved out of town.     He was seen in the ER last week for abdominal pain and was noted to have arteritis. There is a question if he has undiagnosed behcet's due to recurrent large cold sores his mouth. He is awaiting follow up with rheumatology.       Review of Systems   Psychiatric/Behavioral: The patient is nervous/anxious.      Objective    Vitals - Patient Reported  Weight (Patient Reported): 88.5 kg (195 lb)  Height (Patient Reported): 193 cm (6' 4\")  BMI (Based on Pt Reported Ht/Wt): 23.74      Vitals:  No vitals were obtained today due to virtual visit.    Physical Exam   GENERAL: Healthy, alert and no distress  EYES: Eyes grossly normal to inspection.  No discharge or erythema, or obvious scleral/conjunctival abnormalities.  RESP: No audible wheeze, cough, or visible cyanosis.  No visible retractions or increased work of breathing.    PSYCH: Mentation appears normal, affect normal/bright, judgement and insight intact, normal speech and appearance well-groomed.            Video-Visit Details    Video Start Time: 4:01 PM    Type of service:  Video Visit    Video End Time:4:08 pm     Originating Location (pt. Location): Home    Distant Location (provider location):  [facility]     Platform used for Video Visit: [facility]    .  ..  " Added [facility] information to AVS.     1105 Informed by home care liaison that: Medicare will not cover nocturnal oxygen for patients with known or suspected CAMILO. Pt can choose to self pay for about $380/month.    Met with pt at bedside. Pt alert and oriented. Discussed home oxygen and that Medicare will not cover nocturnal oxygen for patients with known or suspected CAMILO. Informed her that she can choose to self pay for about $380/month. Pt is agreeable to paying for home oxygen. Pt aware [facility] will contact her for delivery of home oxygen. Updated home care liaison and MD. Per home care liaison, home oxygen to be delivered; home care liaison aware of discharge date/plan.    0648 Update from home care liaison: order is being processed. Pt will be contacted for delivery.    Plan is for pt to return home via [facility].      See SW/CM flowsheets for other objective data.    Disposition Recommendations:  Preliminary discharge destination: Planned Discharge Destination: Home/apartment  SW/CM recommendation for discharge: Home    Discharge Plan/Needs:     Continued Care and Services - Admitted Since 2/8/2023     Home Medical Care Coordination complete.    Service Provider Request Status Selected Services Address Phone Fax    [facility]  Selected In-Home Nursing Care [address] [facility] [address] [phone] --                Prior To Hospitalization:    Living Situation: Family members and residing at House.    Support Systems: Family members, Children   Home Devices/Equipment: Blood glucose monitor, Mobility assist device   Mobility Assist Devices: Standard walker   Type of Service Prior to Hospitalization: None     Patient/Family discharge goal (s):  Home     Resources provided:           Therapy Recommendations for Discharge:   PT:      OT:       SLP:      Mobility Equipment Recommended for Discharge:        Barriers to Discharge  Identified Barriers to  Discharge/Transition Planning: Medical necessity for acute care

## 2023-02-20 ENCOUNTER — VIRTUAL VISIT (OUTPATIENT)
Dept: PHARMACY | Facility: CLINIC | Age: 43
End: 2023-02-20
Payer: COMMERCIAL

## 2023-02-20 DIAGNOSIS — M54.9 BACK PAIN, UNSPECIFIED BACK LOCATION, UNSPECIFIED BACK PAIN LATERALITY, UNSPECIFIED CHRONICITY: ICD-10-CM

## 2023-02-20 DIAGNOSIS — F33.1 MODERATE EPISODE OF RECURRENT MAJOR DEPRESSIVE DISORDER (H): ICD-10-CM

## 2023-02-20 DIAGNOSIS — Z78.9 TAKES DIETARY SUPPLEMENTS: ICD-10-CM

## 2023-02-20 DIAGNOSIS — M35.2 BEHCET'S DISEASE (H): Primary | ICD-10-CM

## 2023-02-20 PROCEDURE — 99207 PR NO CHARGE LOS: CPT

## 2023-02-20 ASSESSMENT — PATIENT HEALTH QUESTIONNAIRE - PHQ9: SUM OF ALL RESPONSES TO PHQ QUESTIONS 1-9: 13

## 2023-02-20 NOTE — PROGRESS NOTES
Medication Therapy Management (MTM) Encounter    ASSESSMENT:                            Medication Adherence/Access: No issues identified    Behcet's Disease: Per prescribing information, depressed mood and increased depression symptoms occur in less than 1% of patients taking Otezla but are a possible side effect. Due to symptoms occurring at the same time of Otezla start, depression symptoms likely due to side effect and not ineffectiveness of sertraline. Would benefit from decreasing Otezla dose to 30 mg daily and reassessing depression symptoms in 2 weeks.    Depression: Patient is not meeting PHQ9 goal <5 and score has increased since last score done in November. Increased symptoms likely due to Otezla side effect (see Behcet's Disease assessment). If symptoms continue to be increased after lower dose of Otezla could consider increasing sertraline to 100 mg daily.     Back Pain: Stable.    Supplements: Stable.    PLAN:                            1. Decrease Otezla to 30 mg daily to see if depression symptoms improve.    2. Continue sertraline 50 mg daily.    Follow-up: Return in about 2 weeks (around 3/6/2023) for using a [employer] message.    SUBJECTIVE/OBJECTIVE:                          Serjio Farris is a 42 year old male called for an initial visit. He was referred to me from Jimmy Vann MD.      Reason for visit: Increased depression symptoms on Otezla    Allergies/ADRs: Reviewed in chart  Past Medical History: Reviewed in chart  Tobacco: He reports that he quit smoking about 8 years ago. His smoking use included cigarettes. He started smoking about 24 years ago. He has a 1.00 pack-year smoking history. He has never used smokeless tobacco.  Alcohol: not currently using    Medication Adherence/Access: no issues reported    Behcet's Disease: Currently taking Otezla 30 mg twice daily, colchicine 0.6 mg twice daily. Has prednisone taper prescribed if needed. Reports that these medications are overall working  well - no new ulcers noted. Has been on Otezla 30 mg twice daily for about 2 weeks after completing taper up to maintenance dose. Has been noticing increased depression symptoms within the last 1-2 weeks. Has had no other medication changes during this time.    Liver Function Studies - Recent Labs   Lab Test 09/14/22  1517   PROTTOTAL 7.9   ALBUMIN 4.7   BILITOTAL 0.6   ALKPHOS 73   AST 17   ALT 22     CBC RESULTS: Recent Labs   Lab Test 09/14/22  1517   WBC 10.2   RBC 5.26   HGB 15.8   HCT 47.3   MCV 90   MCH 30.0   MCHC 33.4   RDW 12.2        Creatinine   Date Value Ref Range Status   09/14/2022 0.94 0.67 - 1.17 mg/dL Final   02/25/2020 1.03 0.66 - 1.25 mg/dL Final     Depression:  Current medications include: sertraline 50 mg daily. Reports this medication had been helping to manage depression symptoms quite well until the last 1-2 weeks. Feels he overall feels down and has less enjoyment in doing activities he previously enjoyed. No side effects noted. Wondering if he needs to go up on his sertraline dose.    PHQ 8/28/2022 11/22/2022 2/20/2023   PHQ-9 Total Score 12 10 13   Q9: Thoughts of better off dead/self-harm past 2 weeks Not at all Not at all Not at all     Back Pain: Currently taking gabapentin 300 mg nightly. Feels this is helping with pain overnight so he can sleep better - no side effects noted. Did take cyclobenzaprine for a neck muscle spasm last week but this has improved and he is not currently taking cyclobenzaprine any longer.     Supplements: Currently taking multivitamin daily. No reported issues at this time.    Today's Vitals: There were no vitals taken for this visit.  ----------------    I spent 21 minutes with this patient today. All changes were made via collaborative practice agreement with Jimmy Vann MD. A copy of the visit note was provided to the patient's provider(s).    A summary of these recommendations was sent via [employer].    Fidelia Álvarez PharmD  Medication  Therapy Management Pharmacist  [facility]  Phone: [phone]    Telemedicine Visit Details  Type of service:  Telephone visit  Start Time: 11:30 AM  End Time: 11:51 AM     Medication Therapy Recommendations  Behcet's disease (H)    Current Medication: apremilast (OTEZLA) 30 MG tablet   Rationale: Undesirable effect - Adverse medication event - Safety   Recommendation: Decrease Dose - 30 mg daily x 2 weeks   Status: Accepted per CPA

## 2023-02-26 ENCOUNTER — MYC REFILL (OUTPATIENT)
Dept: FAMILY MEDICINE | Facility: CLINIC | Age: 43
End: 2023-02-26
Payer: COMMERCIAL

## 2023-02-26 DIAGNOSIS — M35.2 BEHCET'S DISEASE (H): ICD-10-CM

## 2023-02-26 DIAGNOSIS — M54.50 MIDLINE LOW BACK PAIN WITHOUT SCIATICA, UNSPECIFIED CHRONICITY: ICD-10-CM

## 2023-02-26 DIAGNOSIS — F41.1 GENERALIZED ANXIETY DISORDER: ICD-10-CM

## 2023-02-28 RX ORDER — GABAPENTIN 300 MG/1
300 CAPSULE ORAL AT BEDTIME
Qty: 90 CAPSULE | Refills: 0 | Status: SHIPPED | OUTPATIENT
Start: 2023-02-28 | End: 2023-05-04

## 2023-03-06 NOTE — PATIENT INSTRUCTIONS
"Recommendations from today's MTM visit:                                                       1. Decrease Otezla to 30 mg daily to see if depression symptoms improve.    2. Continue sertraline 50 mg daily.    Follow-up: Return in about 2 weeks (around 3/6/2023) for using a [employer] message.    It was great speaking with you today.  I value your experience and would be very thankful for your time in providing feedback in our clinic survey. In the next few days, you may receive an email or text message from [employer] with a link to a survey related to your  clinical pharmacist.\"     To schedule another MTM appointment, please call the clinic directly or you may call the MTM scheduling line at [phone] or toll-free at [phone].     My Clinical Pharmacist's contact information:                                                      Please feel free to contact me with any questions or concerns you have.      Fidelia Álvarez, PharmD  Medication Therapy Management Pharmacist  [facility]  Phone: [phone]     "

## 2023-03-10 ASSESSMENT — ANXIETY QUESTIONNAIRES
2. NOT BEING ABLE TO STOP OR CONTROL WORRYING: SEVERAL DAYS
5. BEING SO RESTLESS THAT IT IS HARD TO SIT STILL: SEVERAL DAYS
4. TROUBLE RELAXING: SEVERAL DAYS
3. WORRYING TOO MUCH ABOUT DIFFERENT THINGS: SEVERAL DAYS
1. FEELING NERVOUS, ANXIOUS, OR ON EDGE: SEVERAL DAYS
GAD7 TOTAL SCORE: 6
6. BECOMING EASILY ANNOYED OR IRRITABLE: SEVERAL DAYS
8. IF YOU CHECKED OFF ANY PROBLEMS, HOW DIFFICULT HAVE THESE MADE IT FOR YOU TO DO YOUR WORK, TAKE CARE OF THINGS AT HOME, OR GET ALONG WITH OTHER PEOPLE?: SOMEWHAT DIFFICULT
7. FEELING AFRAID AS IF SOMETHING AWFUL MIGHT HAPPEN: NOT AT ALL
7. FEELING AFRAID AS IF SOMETHING AWFUL MIGHT HAPPEN: NOT AT ALL
GAD7 TOTAL SCORE: 6
GAD7 TOTAL SCORE: 6
IF YOU CHECKED OFF ANY PROBLEMS ON THIS QUESTIONNAIRE, HOW DIFFICULT HAVE THESE PROBLEMS MADE IT FOR YOU TO DO YOUR WORK, TAKE CARE OF THINGS AT HOME, OR GET ALONG WITH OTHER PEOPLE: SOMEWHAT DIFFICULT

## 2023-03-10 ASSESSMENT — PATIENT HEALTH QUESTIONNAIRE - PHQ9
10. IF YOU CHECKED OFF ANY PROBLEMS, HOW DIFFICULT HAVE THESE PROBLEMS MADE IT FOR YOU TO DO YOUR WORK, TAKE CARE OF THINGS AT HOME, OR GET ALONG WITH OTHER PEOPLE: SOMEWHAT DIFFICULT
SUM OF ALL RESPONSES TO PHQ QUESTIONS 1-9: 11
SUM OF ALL RESPONSES TO PHQ QUESTIONS 1-9: 11

## 2023-03-13 ENCOUNTER — VIRTUAL VISIT (OUTPATIENT)
Dept: FAMILY MEDICINE | Facility: CLINIC | Age: 43
End: 2023-03-13
Payer: COMMERCIAL

## 2023-03-13 DIAGNOSIS — F33.1 MODERATE EPISODE OF RECURRENT MAJOR DEPRESSIVE DISORDER (H): ICD-10-CM

## 2023-03-13 DIAGNOSIS — G47.9 SLEEP DISTURBANCE: Primary | ICD-10-CM

## 2023-03-13 PROCEDURE — 99214 OFFICE O/P EST MOD 30 MIN: CPT | Mod: VID | Performed by: FAMILY MEDICINE

## 2023-03-13 ASSESSMENT — ENCOUNTER SYMPTOMS
ABDOMINAL PAIN: 0
HEADACHES: 0
SLEEP DISTURBANCE: 1
NERVOUS/ANXIOUS: 1
PALPITATIONS: 0
CONSTITUTIONAL NEGATIVE: 1

## 2023-03-13 ASSESSMENT — ANXIETY QUESTIONNAIRES: GAD7 TOTAL SCORE: 6

## 2023-03-13 ASSESSMENT — PATIENT HEALTH QUESTIONNAIRE - PHQ9
SUM OF ALL RESPONSES TO PHQ QUESTIONS 1-9: 11
10. IF YOU CHECKED OFF ANY PROBLEMS, HOW DIFFICULT HAVE THESE PROBLEMS MADE IT FOR YOU TO DO YOUR WORK, TAKE CARE OF THINGS AT HOME, OR GET ALONG WITH OTHER PEOPLE: SOMEWHAT DIFFICULT

## 2023-03-13 NOTE — PROGRESS NOTES
Serjio is a 42 year old who is being evaluated via a billable video visit.      How would you like to obtain your AVS? MyChart  If the video visit is dropped, the invitation should be resent by: Text to cell phone: [phone]  Will anyone else be joining your video visit? No    Assessment and Plan    (G47.9) Sleep disturbance  (primary encounter diagnosis)  Comment:   Plan: Adult Sleep Eval & Management          Referral            (F33.1) Moderate episode of recurrent major depressive disorder (H)  Comment:   Plan: increase sertraline to 100mg daily, may stop gabapentin    RTC in 1m f/u. CPE    Sabas Cerna MD      Subjective   Serjio is a 42 year old, presenting for the following health issues:  No chief complaint on file.      History of Present Illness       Mental Health Follow-up:  Patient presents to follow-up on Depression & Anxiety.Patient's depression since last visit has been:  Good  The patient is having other symptoms associated with depression.  Patient's anxiety since last visit has been:  Medium  The patient is having other symptoms associated with anxiety.  Any significant life events: No  Patient is not feeling anxious or having panic attacks.  Patient has no concerns about alcohol or drug use.    He eats 2-3 servings of fruits and vegetables daily.He consumes 1 sweetened beverage(s) daily.He exercises with enough effort to increase his heart rate 20 to 29 minutes per day.  He exercises with enough effort to increase his heart rate 3 or less days per week.   He is taking medications regularly.    Today's PHQ-9         PHQ-9 Total Score: 11    PHQ-9 Q9 Thoughts of better off dead/self-harm past 2 weeks :   Not at all    How difficult have these problems made it for you to do your work, take care of things at home, or get along with other people: Somewhat difficult  Today's WILMAR-7 Score: 6       Medication Followup of sertraline 50 MG     Taking Medication as prescribed: yes    Side  Effects:  None    Medication Helping Symptoms:  yes    Needs to establish care as recent PCP.  Mostly concerned with sleep issues.  Mood overall is pretty good, finds sertraline helpful.  Still having a bit of anxiety.    Notes that wife reports that when he wakes he seems to be gasping for air.  He doesn't recall this, but notes that once he's awake he has a lot of trouble falling asleep.  He does snore.    Review of Systems   Constitutional: Negative.    Cardiovascular: Negative for palpitations.   Gastrointestinal: Negative for abdominal pain.   Neurological: Negative for headaches.   Psychiatric/Behavioral: Positive for sleep disturbance. Negative for mood changes and suicidal ideas. The patient is nervous/anxious.             Objective           Vitals:  No vitals were obtained today due to virtual visit.    Physical Exam   GENERAL: Healthy, alert and no distress  EYES: Eyes grossly normal to inspection.  No discharge or erythema, or obvious scleral/conjunctival abnormalities.  RESP: No audible wheeze, cough, or visible cyanosis.  No visible retractions or increased work of breathing.    SKIN: Visible skin clear. No significant rash, abnormal pigmentation or lesions.  NEURO: Cranial nerves grossly intact.  Mentation and speech appropriate for age.  PSYCH: Mentation appears normal, affect normal/bright, judgement and insight intact, normal speech and appearance well-groomed.                Video-Visit Details    Type of service:  Video Visit     Originating Location (pt. Location): Home  Distant Location (provider location):  Off-site  Platform used for Video Visit: [employer]

## 2023-03-16 ENCOUNTER — MYC MEDICAL ADVICE (OUTPATIENT)
Dept: PHARMACY | Facility: CLINIC | Age: 43
End: 2023-03-16
Payer: COMMERCIAL

## 2023-04-14 ENCOUNTER — VIRTUAL VISIT (OUTPATIENT)
Dept: RHEUMATOLOGY | Facility: CLINIC | Age: 43
End: 2023-04-14
Attending: INTERNAL MEDICINE
Payer: COMMERCIAL

## 2023-04-14 DIAGNOSIS — K21.9 GASTROESOPHAGEAL REFLUX DISEASE, UNSPECIFIED WHETHER ESOPHAGITIS PRESENT: ICD-10-CM

## 2023-04-14 DIAGNOSIS — M35.2 BEHCET'S DISEASE (H): Primary | ICD-10-CM

## 2023-04-14 DIAGNOSIS — Z79.899 HIGH RISK MEDICATION USE: ICD-10-CM

## 2023-04-14 PROCEDURE — 99214 OFFICE O/P EST MOD 30 MIN: CPT | Mod: VID | Performed by: INTERNAL MEDICINE

## 2023-04-14 RX ORDER — OMEPRAZOLE 40 MG/1
40 CAPSULE, DELAYED RELEASE ORAL DAILY
Qty: 90 CAPSULE | Refills: 3 | Status: SHIPPED | OUTPATIENT
Start: 2023-04-14 | End: 2024-03-15

## 2023-04-14 RX ORDER — PREDNISONE 10 MG/1
TABLET ORAL
Qty: 45 TABLET | Refills: 0 | Status: SHIPPED | OUTPATIENT
Start: 2023-04-14 | End: 2023-06-09

## 2023-04-14 NOTE — NURSING NOTE
Is the patient currently in the state of [place]? YES    Visit mode:VIDEO    If the visit is dropped, the patient can be reconnected by: VIDEO VISIT: Text to cell phone: [phone]    Will anyone else be joining the visit? NO      How would you like to obtain your AVS? MyChart    Are changes needed to the allergy or medication list? NO    Reason for visit: Follow Up    Rica Granger on 4/14/2023 at 12:53 PM

## 2023-04-14 NOTE — LETTER
4/14/2023       RE: Serjio Farris  [address]  [facility] [address]     Dear Colleague,    Thank you for referring your patient, Serjio Farris, to the [facility] [place] [facility]. Please see a copy of my visit note below.    Virtual Visit Details    Type of service:  Video Visit     Joined the call at 4/14/2023, 1:00:10 pm.  Left the call at 4/14/2023, 1:19:02 pm.  You were on the call for 18 minutes 51 seconds .    Originating Location (pt. Location): home    Distant Location (provider location):  off site  Platform used for Video Visit: [facility] Outpatient Rheumatology Follow-up  Date of service: 4/14/23  Patient name: Serjio Farris  YOB: 1980  MRN: [id number]    Reason for follow-up: [place]    HPI:  -2-3 clusters of sores develop much less severe. Resolve over a period of days. Same with GI symptoms. Also in nose. Over 4 days will improve and resolve  -No significant, if any, blood in the stool  -Joints about the same. Has been seeing chiropractor. Hands still achy.   -Has not used steroids because his flares have not been severe  -Has continued on colchicine 0.6mg BID.   -Has been having reflux/GERD symptoms which have been more pronounced recently.   -Has continued on otezla 30mg BID, however has been having reflux/GERD symptoms and GI upset which has been an issue in the past, however much more severe over the last month or so  -No new/progressive fevers/chills/night sweats  -no unintentional weight loss  -no interval infections  -no new rashes  14 point ROS collected and negative if not documented above.     Interval history 1/13/23  -middle of dec had very mild flare of 1-2 oral lesions  -at same time mild GI symptoms consistent with prior flares  -otherwise his disease has been more quiet/no other new/evolving symptoms  -has been on colchicine BID and the above flare was more  "mild in re: to severity and resolved more quickly  -last dose of prednisone was last week for his recent flare. He was started on prednisone taper for his flare (starting at 50mg daily and decreasing by 10mg every 5 days). Tolerated prednisone without noticeable side effects.   -No GI upset/diarrhea with colchicine  -No interval infections/new or progressive rash/fevers/chills/night sweats  -No interval progressive occular symptoms.   14 point ROS collected and otherwise negative.      Interval history:  10/21/22  -2-3 GI flares (took naproxen which resolves them in 2-3 days). Reports that all episodes were associated with blood in his stool  -1 oral flare of ulcers.  -No genital ulcers/eye symptoms since his last visit  -Feels that his flares (specific oral ulcers) have been milder while being on steroids. Currently on 17.5mg of prednisone. Has noticed increased appetite. Initially some trouble sleeping in the first 1-2 weeks, then no issues. Has developed upper acne on back/neck/chest/arms with steroids. No other rashes  -No interval infections.     14 point review of systems collected and negative if not documented above.    HPI from initial consultation  At age 22 would develop 5-6 ulcers back of throat last for 10 days or so. 1-2 times per month. Severely painful. Would try mouthwashes/losanges. Nothing was much helpful. Difficult to eat during these periods. These have continued. Then at age [age], woke up with droopy face on the left. Was told he had a \"mini stroke\"/ TIA. No stroke found on MRI. Symptoms resolved after about 24 hours.     Once he met his wife, has had numerous stomach issues. Has had multiple EGDs/Colonoscopies. Multiple bloody stools. Has had this ongoing since 2009/2010. Was on mezavant XL. Would be weak and fatigued during these episodes. Flares would last for days. [place] the [place] in [place] this therapy was stopped. Did food allergy testing. Started on FODMAP diet. He still has GI " flares about once per month. Lasting for about 5 days or so. Stool can have bright red blood or be black/tarry.    In the last year has had 2 episodes of genital ulcers. 8-12 oral ulcers. He has found that the flares of his oral ulcers and GI flares with blood occur at the same time, though the genital ulcers are independent. He also has developed nasal ulcers which correspond to his oral/GI ulcers. Has had thorough infectious work-up with his recurrent oral/nasal/genital ulcers all of which has been unrevealing.     3 days prior to going to the ED last month and what ultimately prompted referral to rheumatology today, he reports he had a typical evening. Went to bed. Woke up around midnight with epigastric pain. Severe/unbearable. Was brought to the ED and eval done at that time included CT abdomen/ pelvis with contrast which showed minor soft tissue density and fat stranding around the origin of the celial artery, right hepatic artery, and to a lesser extent the superior mesenteric artery. He was started on prednisone 80mg once daily with a taper over 2 months. He is currently on 40mg once daily today. His abdominal symptoms have resolved. Since starting prednisone he has not had interval oral/genital/GI bleeding.     No history of occular inflammation. Does have back pain/CMC/greater MTP pain.     Has pain in the bottom of his back, bilateral lumbar spine. Worst in the AM before he gets up out of bed. He has alternating between left/right at times. It has woken him up in the second half of the night.     No symptoms consistent with dactylitis.     Has a history of bradycardia. No history of DVT/PE. No symptoms consistent with raynauds.     Over the last few months he has noticed that he has been diaphoretic. He has a normal temperature. During flares he does have fatigue/chills.     Has had aching/shaking of his left hand/arm. Ongoing. No wrist drop/foot drop     14 point ROS collected and negative if not  documented above.     Past Medical History:  Past Medical History:   Diagnosis Date    Depressive disorder 2012/2013    Due to my stomach issues and not being sorted. OK now    Stroke (H) age 25 approx    possible     Past Surgical History:  Past Surgical History:   Procedure Laterality Date    COLONOSCOPY N/A 04/27/2015    Procedure: COLONOSCOPY;  Surgeon: Pako Iqbal MD;  Location: [facility] OR    COLONOSCOPY WITH CO2 INSUFFLATION N/A 04/27/2015    Procedure: COLONOSCOPY WITH CO2 INSUFFLATION;  Surgeon: Pako Iqbal MD;  Location: [facility] OR    ESOPHAGOSCOPY, GASTROSCOPY, DUODENOSCOPY (EGD), COMBINED Left 05/28/2015    Procedure: COMBINED ESOPHAGOSCOPY, GASTROSCOPY, DUODENOSCOPY (EGD), BIOPSY SINGLE OR MULTIPLE;  Surgeon: Pako Iqbal MD;  Location: [facility]    HC BREATH HYDROGEN TEST N/A 09/11/2015    Procedure: HYDROGEN BREATH TEST;  Surgeon: Pako Iqbal MD;  Location: [facility]    HC TOOTH EXTRACTION W/FORCEP      age 15    testicular torsion  2010    UROLOGY PROCEDURE                         DATE:  01/01/2012    Testicular Torsion     Medications:  Current Outpatient Medications   Medication    apremilast (OTEZLA) 30 MG tablet    cyclobenzaprine (FLEXERIL) 10 MG tablet    gabapentin (NEURONTIN) 300 MG capsule    MITIGARE 0.6 MG capsule    Multiple Vitamin (MULTIVITAMIN ADULT PO)    sertraline (ZOLOFT) 50 MG tablet    triamcinolone (KENALOG) 0.1 % paste     No current facility-administered medications for this visit.   Currently on 40mg of prednisone daily    Allergies:  Allergies   Allergen Reactions    Ibuprofen GI Disturbance     Family history:  No family history of autoimmune disease, though sister does have oral ulcers.     Social History:  , no children  ETOH: rare  Smoking: Quit smoking in 2014  Drug use: No IVDU  Occupation: Works for [employer]    Objective:  There were no vitals taken for this visit.  NAD, sitting up unassisted, pleasant and interactive as always.   No facial rash, sclera  appear clear  Breathing comfortably no use of accessory muscles no audible wheeze  Lower extremity dependent edema    WBC   Date Value Ref Range Status   02/25/2020 6.2 4.0 - 11.0 10e9/L Final     WBC Count   Date Value Ref Range Status   09/14/2022 10.2 4.0 - 11.0 10e3/uL Final     Hemoglobin   Date Value Ref Range Status   09/14/2022 15.8 13.3 - 17.7 g/dL Final   02/25/2020 14.7 13.3 - 17.7 g/dL Final     Platelet Count   Date Value Ref Range Status   09/14/2022 212 150 - 450 10e3/uL Final   02/25/2020 152 150 - 450 10e9/L Final     Creatinine   Date Value Ref Range Status   09/14/2022 0.94 0.67 - 1.17 mg/dL Final   02/25/2020 1.03 0.66 - 1.25 mg/dL Final     Lab Results   Component Value Date    ALKPHOS 66 06/17/2022    ALKPHOS 70 02/25/2020     AST   Date Value Ref Range Status   09/14/2022 17 10 - 50 U/L Final   02/25/2020 14 0 - 45 U/L Final     Lab Results   Component Value Date    ALT 21 06/17/2022    ALT 19 02/25/2020     Sed Rate   Date Value Ref Range Status   04/07/2015 7 0 - 15 mm/h Final     Erythrocyte Sedimentation Rate   Date Value Ref Range Status   09/14/2022 7 0 - 15 mm/hr Final   /  CRP Inflammation   Date Value Ref Range Status   04/07/2015 <2.9 0.0 - 8.0 mg/L Final     UA RESULTS:  No results for input(s): COLOR, APPEARANCE, URINEGLC, URINEBILI, URINEKETONE, SG, UBLD, URINEPH, PROTEIN, UROBILINOGEN, NITRITE, LEUKEST, RBCU, WBCU in the last [id number] hours.   MARVIN Screen by EIA   Date Value Ref Range Status   05/05/2015 <1.0  Interpretation:  Negative   <1.0 Final     DNA (ds) Antibody   Date Value Ref Range Status   09/14/2022 0.6 <10.0 IU/mL Final     Comment:     Negative     RNP Antibody IgG   Date Value Ref Range Status   09/14/2022 Negative Negative Final     Rheumatoid Factor   Date Value Ref Range Status   09/14/2022 <6 <12 IU/mL Final   05/05/2015 <20 <20 IU/mL Final     Cyclic Citrullinated Peptide Antibody IgG   Date Value Ref Range Status   09/14/2022 <0.4 <7.0 U/mL Final     Comment:      Negative     Myeloperoxidase Antibody IgG   Date Value Ref Range Status   09/14/2022 Negative Negative Final     Proteinase 3 Antibody IgG   Date Value Ref Range Status   09/14/2022 Negative Negative Final     Imaging  8/24/22  CT abdomen and pelvis with contrast at [facility] available in care everywhere  FINDINGS:   Lung bases clear     Small cyst left hepatic lobe near the dome.  A few other tiny cystic lesions   too small to characterize though likely also small cysts.     Unremarkable gallbladder     Unremarkable spleen and adrenal glands.     In normal pancreas.     Normal perfusion of kidneys.  Unremarkable bladder     No inflamed or obstructed bowel.  No significant atherosclerotic   calcifications.  Proximal portion of the major midline vessels patent.     Minor ill-defined soft tissue density around the celiac artery, origin of the   superior mesenteric artery and around the proximal segment of the hepatic   arteries.  Mild narrowing at the origin of the celiac artery.     No enlarged lymph nodes.     Para-aortic fat appeared normal without findings of adenopathy or   retroperitoneal fibrosis around the aorta.  Negative for retroperitoneal mass.    Impression  1. Minor soft tissue density and fat stranding around origin of celiac artery,   right hepatic artery and to lesser extent superior mesenteric artery.  Some   component of retroperitoneal fibrosis, vasculitis or less likely   lymphoproliferative malignancy are differentials.  The mild narrowing of celiac   artery with slight inferior displacement at origin less likely to reflect a   component of median arcuate ligament syndrome.   2. Negative for adenopathy   3. Negative for findings of retroperitoneal fibrosis along aorta margins   4. No significant discrepancy with preliminary   5.  CODE 3:  Report contains unexpected findings requiring further evaluation   or follow-up.    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     COMPARISON: February 25, 2020     TECHNIQUE: Multiplanar multiformatted CTA images were obtained from  the lung apices through the chest, abdomen, and pelvis after the  uneventful administration of Isovue-370 intravenous contrast given for  a total of 80 mL. Radiation dose for this scan was reduced using  automated exposure control, adjustment of the mA and/or kV according  to patient size, or iterative reconstruction technique. Three-D  reformatted images were created at a separate workstation.     FINDINGS: The visible thyroid gland is unremarkable. No abnormally  enlarged mediastinal lymph nodes. Heart size is normal. No acute  osseous abnormality. No pulmonary masses.     The gallbladder is contracted. The liver has a small hypodensity in  the left hepatic lobe, measuring 1 cm in size, present on previous  exam, perhaps minimally increased since that time. The spleen, adrenal  glands, and pancreas are unremarkable. Both kidneys are normally  perfused. No hydronephrosis or nephrolithiasis. No intraperitoneal  fluid or air. The bladder is mostly decompressed. Left hydrocele is  partially visible. No dilated loops of bowel.     The ascending thoracic aorta is 2.6 x 2.7 cm. The aortic arch is 2 cm.  Origins of the great arteries are widely patent. Descending thoracic  aorta is normal in size and appearance. The celiac axis, SMA,  bilateral renal, and inferior mesenteric arteries are patent. The  abdominal aorta is normal in size and appearance. No wall thickening  or surrounding inflammatory change. Both common iliac, internal iliac,  external iliac, and common femoral arteries are patent.                                                                      IMPRESSION:  1. Patent thoracoabdominal aorta. No aneurysmal dilatation or  suggestion of inflammation. No stenosis.  2. Visceral arteries are patent and appear unremarkable.  3. Tiny hypodensity in the left  "hepatic lobe is 1 cm, minimally  increased in size from previous exam.    A/P  42 year old male was referred to rheumatology for evaluation of vasculitis in the setting of a ED evaluation on 8/24/22 for severe abdominal pain during which a CT abdomen/pelvis with contrast was obtained and showed minor soft tissue density and fat stranding around the origin of the celiac artery, right hepatic artery and to a lesser extent superior mesenteric artery.     His history is notable for  1. Recurrent oral ulcerations: 5-12 at one time. Occur at least monthly. Infectious work-up negative. Started around age 22  2. Recurrent nasal ulcerations: occur at the same time as his oral ulcers  3. 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 Has had scarring on his scrotum due to prior lesions  4. Recurrent abdominal pain associated with bloody stool (sometimes bright red sometimes black/tarry): occurs at the same time as his oral/nasal ulcers.  5. Has a history of what sounds like a TIA. Was told it was a \"mini stroke\". This was in the [place]. No records are available to review in our system.     No evidence of cutaneous lesions or occular inflammation by history/exam/review of the record.     Taken together concern for vasculitis and more specifically behcets given his clinical history/findings. Additional work-up to ensure no other process was driving these findings was pursued and included  CBC, CMP, ESR, CRP, C3, C4, cryo, MARVIN, SSA, SSB, Smith, RNP, RF, CCP, ANCA, MPO, PR3, IgG subclasses, treponema with reflex, Hep B/C/HIV/quant gold, ACE, 1,25 dihydroxyvitamin D all of which was negative/normal. I also obtained a TPMT enzyme which was normal.  He presented to me on prednisone 40mg daily which was started in the ED and taper plan established: 9/7- 9/20 40mg daily then 9/21- 10/4 30mg daily then 10/5-10/18 20 mg daily then 10/19-11/1 17.5 mg daily then 11/2-11/15  15 mg daily then " 11/16-11/29 12.5mg daily then  11/30 start 10mg daily then 7.5mg daily x2 weeks then 5mg daily x2 weeks then 2.5mg daily x2 weeks then stop. We obtained repeat imaging on 10/5/22 with CTA chest abdomen pelvis which showed complete resolution of the previously noted inflammatory findings concerning for vasculitis. He was started on colchicine 0.6mg BID and he successfully tapered off of prednisone.  Due to ongoing active symptoms consistent with prior flares which were occurring through colchicine 0.6 mg twice daily he was started on apremilast 30 mg twice daily.  He continues on this now.  He has had  Response to this with significant reduction in the frequency and severity of his oral nasal recurrent ulcerations.  His GI symptoms are much improved as well.  However he started to have worsening of reflux GERD and GI upset.  Common side effect associated with apremilast.  However the reflux component may be mitigated with addition of a proton pump inhibitor.  This will be our first step as we attempt to continue on his current therapy which appears to be working well for him.  If his reflux symptoms do not improve after starting Prilosec and then our plan will be to reduce his dose of apremilast from 30 mg twice daily down to 30 mg once daily while continuing on Prilosec.  If we need to transition away from apremilast our plan will be to use combination azathioprine along with adalimumab.  He will update me in the next few weeks and let me know how his symptoms are going.    Have ordered prednisone for him to have on hand should he flare. He knows to reach out should he use it so that I can track along with severe flares in between appointments/refill.     High risk medication use: Apremilast and colchicine  - Risk benefits of both medications discussed to include infection, GI/bladder toxicity, bone marrow suppression, rash, GI upset, malignancy among others.  He is agreeable to continue given the benefit he is  already achieved and lack of side effects at this time though unable to determine whether the current GI upset is secondary to either the colchicine or apremilast as we try to determine this with the addition of proton pump inhibitor as outlined above.  - He is overdue for routine screening drug toxicity monitoring labs.  Standing orders are in place for CBC and CMP.  He knows to have these done.  We will call him to schedule his labs now and follow-up with me in about 3 months time.    Labs in the next few weeks and follow-up with me in 3-4 months.     Jimmy Vann MD  Rheumatology

## 2023-04-14 NOTE — Clinical Note
Please call and schedule 1) lab appointment in the next week or two 2) follow-up with me in august/sept (video OK, can use FABIAN slot if needed)

## 2023-04-14 NOTE — PROGRESS NOTES
Virtual Visit Details    Type of service:  Video Visit     Joined the call at 4/14/2023, 1:00:10 pm.  Left the call at 4/14/2023, 1:19:02 pm.  You were on the call for 18 minutes 51 seconds .    Originating Location (pt. Location): home    Distant Location (provider location):  off site  Platform used for Video Visit: [facility]  Date of service: 4/14/23  Patient name: Serjio Farris  YOB: 1980  MRN: [id number]    Reason for follow-up: Behcets    HPI:  -2-3 clusters of sores develop much less severe. Resolve over a period of days. Same with GI symptoms. Also in nose. Over 4 days will improve and resolve  -No significant, if any, blood in the stool  -Joints about the same. Has been seeing chiropractor. Hands still achy.   -Has not used steroids because his flares have not been severe  -Has continued on colchicine 0.6mg BID.   -Has been having reflux/GERD symptoms which have been more pronounced recently.   -Has continued on otezla 30mg BID, however has been having reflux/GERD symptoms and GI upset which has been an issue in the past, however much more severe over the last month or so  -No new/progressive fevers/chills/night sweats  -no unintentional weight loss  -no interval infections  -no new rashes  14 point ROS collected and negative if not documented above.     Interval history 1/13/23  -middle of dec had very mild flare of 1-2 oral lesions  -at same time mild GI symptoms consistent with prior flares  -otherwise his disease has been more quiet/no other new/evolving symptoms  -has been on colchicine BID and the above flare was more mild in re: to severity and resolved more quickly  -last dose of prednisone was last week for his recent flare. He was started on prednisone taper for his flare (starting at 50mg daily and decreasing by 10mg every 5 days). Tolerated prednisone without noticeable side effects.   -No GI upset/diarrhea with  "colchicine  -No interval infections/new or progressive rash/fevers/chills/night sweats  -No interval progressive occular symptoms.   14 point ROS collected and otherwise negative.      Interval history:  10/21/22  -2-3 GI flares (took naproxen which resolves them in 2-3 days). Reports that all episodes were associated with blood in his stool  -1 oral flare of ulcers.  -No genital ulcers/eye symptoms since his last visit  -Feels that his flares (specific oral ulcers) have been milder while being on steroids. Currently on 17.5mg of prednisone. Has noticed increased appetite. Initially some trouble sleeping in the first 1-2 weeks, then no issues. Has developed upper acne on back/neck/chest/arms with steroids. No other rashes  -No interval infections.     14 point review of systems collected and negative if not documented above.    HPI from initial consultation  At age 22 would develop 5-6 ulcers back of throat last for 10 days or so. 1-2 times per month. Severely painful. Would try mouthwashes/losanges. Nothing was much helpful. Difficult to eat during these periods. These have continued. Then at age [age], woke up with droopy face on the left. Was told he had a \"mini stroke\"/ TIA. No stroke found on MRI. Symptoms resolved after about 24 hours.     Once he met his wife, has had numerous stomach issues. Has had multiple EGDs/Colonoscopies. Multiple bloody stools. Has had this ongoing since 2009/2010. Was on mezavant XL. Would be weak and fatigued during these episodes. Flares would last for days. [place] came to the [place] in [place] this therapy was stopped. Did food allergy testing. Started on FODMAP diet. He still has GI flares about once per month. Lasting for about 5 days or so. Stool can have bright red blood or be black/tarry.    In the last year has had 2 episodes of genital ulcers. 8-12 oral ulcers. He has found that the flares of his oral ulcers and GI flares with blood occur at the same time, though the genital " ulcers are independent. He also has developed nasal ulcers which correspond to his oral/GI ulcers. Has had thorough infectious work-up with his recurrent oral/nasal/genital ulcers all of which has been unrevealing.     3 days prior to going to the ED last month and what ultimately prompted referral to rheumatology today, he reports he had a typical evening. Went to bed. Woke up around midnight with epigastric pain. Severe/unbearable. Was brought to the ED and eval done at that time included CT abdomen/ pelvis with contrast which showed minor soft tissue density and fat stranding around the origin of the celial artery, right hepatic artery, and to a lesser extent the superior mesenteric artery. He was started on prednisone 80mg once daily with a taper over 2 months. He is currently on 40mg once daily today. His abdominal symptoms have resolved. Since starting prednisone he has not had interval oral/genital/GI bleeding.     No history of occular inflammation. Does have back pain/CMC/greater MTP pain.     Has pain in the bottom of his back, bilateral lumbar spine. Worst in the AM before he gets up out of bed. He has alternating between left/right at times. It has woken him up in the second half of the night.     No symptoms consistent with dactylitis.     Has a history of bradycardia. No history of DVT/PE. No symptoms consistent with raynauds.     Over the last few months he has noticed that he has been diaphoretic. He has a normal temperature. During flares he does have fatigue/chills.     Has had aching/shaking of his left hand/arm. Ongoing. No wrist drop/foot drop     14 point ROS collected and negative if not documented above.     Past Medical History:  Past Medical History:   Diagnosis Date     Depressive disorder 2012/2013    Due to my stomach issues and not being sorted. OK now     Stroke (H) age 25 approx    possible     Past Surgical History:  Past Surgical History:   Procedure Laterality Date     COLONOSCOPY  N/A 04/27/2015    Procedure: COLONOSCOPY;  Surgeon: Pako Iqbal MD;  Location: [facility] OR     COLONOSCOPY WITH CO2 INSUFFLATION N/A 04/27/2015    Procedure: COLONOSCOPY WITH CO2 INSUFFLATION;  Surgeon: Pako Iqbal MD;  Location: [facility] OR     ESOPHAGOSCOPY, GASTROSCOPY, DUODENOSCOPY (EGD), COMBINED Left 05/28/2015    Procedure: COMBINED ESOPHAGOSCOPY, GASTROSCOPY, DUODENOSCOPY (EGD), BIOPSY SINGLE OR MULTIPLE;  Surgeon: Pako Iqbal MD;  Location: [facility]     HC BREATH HYDROGEN TEST N/A 09/11/2015    Procedure: HYDROGEN BREATH TEST;  Surgeon: Pako Iqbal MD;  Location: [facility]     HC TOOTH EXTRACTION W/FORCEP      age 15     testicular torsion  2010     UROLOGY PROCEDURE                         DATE:  01/01/2012    Testicular Torsion     Medications:  Current Outpatient Medications   Medication     apremilast (OTEZLA) 30 MG tablet     cyclobenzaprine (FLEXERIL) 10 MG tablet     gabapentin (NEURONTIN) 300 MG capsule     MITIGARE 0.6 MG capsule     Multiple Vitamin (MULTIVITAMIN ADULT PO)     sertraline (ZOLOFT) 50 MG tablet     triamcinolone (KENALOG) 0.1 % paste     No current facility-administered medications for this visit.   Currently on 40mg of prednisone daily    Allergies:  Allergies   Allergen Reactions     Ibuprofen GI Disturbance     Family history:  No family history of autoimmune disease, though sister does have oral ulcers.     Social History:  , no children  ETOH: rare  Smoking: Quit smoking in 2014  Drug use: No IVDU  Occupation: Works for [employer]    Objective:  There were no vitals taken for this visit.  NAD, sitting up unassisted, pleasant and interactive as always.   No facial rash, sclera appear clear  Breathing comfortably no use of accessory muscles no audible wheeze  Lower extremity dependent edema    WBC   Date Value Ref Range Status   02/25/2020 6.2 4.0 - 11.0 10e9/L Final     WBC Count   Date Value Ref Range Status   09/14/2022 10.2 4.0 - 11.0 10e3/uL Final     Hemoglobin    Date Value Ref Range Status   09/14/2022 15.8 13.3 - 17.7 g/dL Final   02/25/2020 14.7 13.3 - 17.7 g/dL Final     Platelet Count   Date Value Ref Range Status   09/14/2022 212 150 - 450 10e3/uL Final   02/25/2020 152 150 - 450 10e9/L Final     Creatinine   Date Value Ref Range Status   09/14/2022 0.94 0.67 - 1.17 mg/dL Final   02/25/2020 1.03 0.66 - 1.25 mg/dL Final     Lab Results   Component Value Date    ALKPHOS 66 06/17/2022    ALKPHOS 70 02/25/2020     AST   Date Value Ref Range Status   09/14/2022 17 10 - 50 U/L Final   02/25/2020 14 0 - 45 U/L Final     Lab Results   Component Value Date    ALT 21 06/17/2022    ALT 19 02/25/2020     Sed Rate   Date Value Ref Range Status   04/07/2015 7 0 - 15 mm/h Final     Erythrocyte Sedimentation Rate   Date Value Ref Range Status   09/14/2022 7 0 - 15 mm/hr Final   /  CRP Inflammation   Date Value Ref Range Status   04/07/2015 <2.9 0.0 - 8.0 mg/L Final     UA RESULTS:  No results for input(s): COLOR, APPEARANCE, URINEGLC, URINEBILI, URINEKETONE, SG, UBLD, URINEPH, PROTEIN, UROBILINOGEN, NITRITE, LEUKEST, RBCU, WBCU in the last [id number] hours.   MARVIN Screen by EIA   Date Value Ref Range Status   05/05/2015 <1.0  Interpretation:  Negative   <1.0 Final     DNA (ds) Antibody   Date Value Ref Range Status   09/14/2022 0.6 <10.0 IU/mL Final     Comment:     Negative     RNP Antibody IgG   Date Value Ref Range Status   09/14/2022 Negative Negative Final     Rheumatoid Factor   Date Value Ref Range Status   09/14/2022 <6 <12 IU/mL Final   05/05/2015 <20 <20 IU/mL Final     Cyclic Citrullinated Peptide Antibody IgG   Date Value Ref Range Status   09/14/2022 <0.4 <7.0 U/mL Final     Comment:     Negative     Myeloperoxidase Antibody IgG   Date Value Ref Range Status   09/14/2022 Negative Negative Final     Proteinase 3 Antibody IgG   Date Value Ref Range Status   09/14/2022 Negative Negative Final     Imaging  8/24/22  CT abdomen and pelvis with contrast at [facility] available in  care everywhere  FINDINGS:   Lung bases clear     Small cyst left hepatic lobe near the dome.  A few other tiny cystic lesions   too small to characterize though likely also small cysts.     Unremarkable gallbladder     Unremarkable spleen and adrenal glands.     In normal pancreas.     Normal perfusion of kidneys.  Unremarkable bladder     No inflamed or obstructed bowel.  No significant atherosclerotic   calcifications.  Proximal portion of the major midline vessels patent.     Minor ill-defined soft tissue density around the celiac artery, origin of the   superior mesenteric artery and around the proximal segment of the hepatic   arteries.  Mild narrowing at the origin of the celiac artery.     No enlarged lymph nodes.     Para-aortic fat appeared normal without findings of adenopathy or   retroperitoneal fibrosis around the aorta.  Negative for retroperitoneal mass.    Impression  1. Minor soft tissue density and fat stranding around origin of celiac artery,   right hepatic artery and to lesser extent superior mesenteric artery.  Some   component of retroperitoneal fibrosis, vasculitis or less likely   lymphoproliferative malignancy are differentials.  The mild narrowing of celiac   artery with slight inferior displacement at origin less likely to reflect a   component of median arcuate ligament syndrome.   2. Negative for adenopathy   3. Negative for findings of retroperitoneal fibrosis along aorta margins   4. No significant discrepancy with preliminary   5.  CODE 3:  Report contains unexpected findings requiring further evaluation   or follow-up.    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     COMPARISON: February 25, 2020     TECHNIQUE: Multiplanar multiformatted CTA images were obtained from  the lung apices through the chest, abdomen, and pelvis after the  uneventful administration of Isovue-370 intravenous contrast given  for  a total of 80 mL. Radiation dose for this scan was reduced using  automated exposure control, adjustment of the mA and/or kV according  to patient size, or iterative reconstruction technique. Three-D  reformatted images were created at a separate workstation.     FINDINGS: The visible thyroid gland is unremarkable. No abnormally  enlarged mediastinal lymph nodes. Heart size is normal. No acute  osseous abnormality. No pulmonary masses.     The gallbladder is contracted. The liver has a small hypodensity in  the left hepatic lobe, measuring 1 cm in size, present on previous  exam, perhaps minimally increased since that time. The spleen, adrenal  glands, and pancreas are unremarkable. Both kidneys are normally  perfused. No hydronephrosis or nephrolithiasis. No intraperitoneal  fluid or air. The bladder is mostly decompressed. Left hydrocele is  partially visible. No dilated loops of bowel.     The ascending thoracic aorta is 2.6 x 2.7 cm. The aortic arch is 2 cm.  Origins of the great arteries are widely patent. Descending thoracic  aorta is normal in size and appearance. The celiac axis, SMA,  bilateral renal, and inferior mesenteric arteries are patent. The  abdominal aorta is normal in size and appearance. No wall thickening  or surrounding inflammatory change. Both common iliac, internal iliac,  external iliac, and common femoral arteries are patent.                                                                      IMPRESSION:  1. Patent thoracoabdominal aorta. No aneurysmal dilatation or  suggestion of inflammation. No stenosis.  2. Visceral arteries are patent and appear unremarkable.  3. Tiny hypodensity in the left hepatic lobe is 1 cm, minimally  increased in size from previous exam.    A/P  42 year old male was referred to rheumatology for evaluation of vasculitis in the setting of a ED evaluation on 8/24/22 for severe abdominal pain during which a CT abdomen/pelvis with contrast was obtained and  "showed minor soft tissue density and fat stranding around the origin of the celiac artery, right hepatic artery and to a lesser extent superior mesenteric artery.     His history is notable for  1. Recurrent oral ulcerations: 5-12 at one time. Occur at least monthly. Infectious work-up negative. Started around age 22  2. Recurrent nasal ulcerations: occur at the same time as his oral ulcers  3. 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 Has had scarring on his scrotum due to prior lesions  4. Recurrent abdominal pain associated with bloody stool (sometimes bright red sometimes black/tarry): occurs at the same time as his oral/nasal ulcers.  5. Has a history of what sounds like a TIA. Was told it was a \"mini stroke\". This was in the [place]. No records are available to review in our system.     No evidence of cutaneous lesions or occular inflammation by history/exam/review of the record.     Taken together concern for vasculitis and more specifically behcets given his clinical history/findings. Additional work-up to ensure no other process was driving these findings was pursued and included  CBC, CMP, ESR, CRP, C3, C4, cryo, MARVIN, SSA, SSB, Smith, RNP, RF, CCP, ANCA, MPO, PR3, IgG subclasses, treponema with reflex, Hep B/C/HIV/quant gold, ACE, 1,25 dihydroxyvitamin D all of which was negative/normal. I also obtained a TPMT enzyme which was normal.  He presented to me on prednisone 40mg daily which was started in the ED and taper plan established: 9/7- 9/20 40mg daily then 9/21- 10/4 30mg daily then 10/5-10/18 20 mg daily then 10/19-11/1 17.5 mg daily then 11/2-11/15  15 mg daily then 11/16-11/29 12.5mg daily then  11/30 start 10mg daily then 7.5mg daily x2 weeks then 5mg daily x2 weeks then 2.5mg daily x2 weeks then stop. We obtained repeat imaging on 10/5/22 with CTA chest abdomen pelvis which showed complete resolution of the previously noted inflammatory findings " concerning for vasculitis. He was started on colchicine 0.6mg BID and he successfully tapered off of prednisone.  Due to ongoing active symptoms consistent with prior flares which were occurring through colchicine 0.6 mg twice daily he was started on apremilast 30 mg twice daily.  He continues on this now.  He has had  Response to this with significant reduction in the frequency and severity of his oral nasal recurrent ulcerations.  His GI symptoms are much improved as well.  However he started to have worsening of reflux GERD and GI upset.  Common side effect associated with apremilast.  However the reflux component may be mitigated with addition of a proton pump inhibitor.  This will be our first step as we attempt to continue on his current therapy which appears to be working well for him.  If his reflux symptoms do not improve after starting Prilosec and then our plan will be to reduce his dose of apremilast from 30 mg twice daily down to 30 mg once daily while continuing on Prilosec.  If we need to transition away from apremilast our plan will be to use combination azathioprine along with adalimumab.  He will update me in the next few weeks and let me know how his symptoms are going.    Have ordered prednisone for him to have on hand should he flare. He knows to reach out should he use it so that I can track along with severe flares in between appointments/refill.     High risk medication use: Apremilast and colchicine  - Risk benefits of both medications discussed to include infection, GI/bladder toxicity, bone marrow suppression, rash, GI upset, malignancy among others.  He is agreeable to continue given the benefit he is already achieved and lack of side effects at this time though unable to determine whether the current GI upset is secondary to either the colchicine or apremilast as we try to determine this with the addition of proton pump inhibitor as outlined above.  - He is overdue for routine screening  drug toxicity monitoring labs.  Standing orders are in place for CBC and CMP.  He knows to have these done.  We will call him to schedule his labs now and follow-up with me in about 3 months time.    Labs in the next few weeks and follow-up with me in 3-4 months.     Jimmy Vann MD  Rheumatology

## 2023-05-02 ASSESSMENT — ENCOUNTER SYMPTOMS
PARESTHESIAS: 0
NAUSEA: 1
SHORTNESS OF BREATH: 0
FEVER: 0
HEARTBURN: 1
COUGH: 0
NERVOUS/ANXIOUS: 0
MYALGIAS: 0
HEADACHES: 0
HEMATOCHEZIA: 0
ABDOMINAL PAIN: 0
HEMATURIA: 0
PALPITATIONS: 0
WEAKNESS: 0
EYE PAIN: 0
DIZZINESS: 0
DYSURIA: 0
CONSTIPATION: 0
DIARRHEA: 0
ARTHRALGIAS: 1
JOINT SWELLING: 1
CHILLS: 0
FREQUENCY: 0
SORE THROAT: 0

## 2023-05-04 ENCOUNTER — OFFICE VISIT (OUTPATIENT)
Dept: FAMILY MEDICINE | Facility: CLINIC | Age: 43
End: 2023-05-04
Payer: COMMERCIAL

## 2023-05-04 VITALS
TEMPERATURE: 98.8 F | OXYGEN SATURATION: 97 % | HEART RATE: 83 BPM | RESPIRATION RATE: 16 BRPM | HEIGHT: 76 IN | DIASTOLIC BLOOD PRESSURE: 71 MMHG | SYSTOLIC BLOOD PRESSURE: 117 MMHG | WEIGHT: 200 LBS | BODY MASS INDEX: 24.36 KG/M2

## 2023-05-04 DIAGNOSIS — M35.2 BEHCET'S DISEASE (H): ICD-10-CM

## 2023-05-04 DIAGNOSIS — F33.1 MODERATE EPISODE OF RECURRENT MAJOR DEPRESSIVE DISORDER (H): ICD-10-CM

## 2023-05-04 DIAGNOSIS — F41.1 GENERALIZED ANXIETY DISORDER: ICD-10-CM

## 2023-05-04 DIAGNOSIS — Z00.00 ENCOUNTER FOR PREVENTIVE CARE: Primary | ICD-10-CM

## 2023-05-04 PROCEDURE — 99396 PREV VISIT EST AGE 40-64: CPT | Mod: 25 | Performed by: FAMILY MEDICINE

## 2023-05-04 PROCEDURE — 90471 IMMUNIZATION ADMIN: CPT | Performed by: FAMILY MEDICINE

## 2023-05-04 PROCEDURE — 99213 OFFICE O/P EST LOW 20 MIN: CPT | Mod: 25 | Performed by: FAMILY MEDICINE

## 2023-05-04 PROCEDURE — 90715 TDAP VACCINE 7 YRS/> IM: CPT | Performed by: FAMILY MEDICINE

## 2023-05-04 RX ORDER — SERTRALINE HYDROCHLORIDE 100 MG/1
50 TABLET, FILM COATED ORAL DAILY
Qty: 90 TABLET | Refills: 1 | Status: SHIPPED | OUTPATIENT
Start: 2023-05-04 | End: 2023-11-21

## 2023-05-04 RX ORDER — TRAZODONE HYDROCHLORIDE 50 MG/1
50 TABLET, FILM COATED ORAL AT BEDTIME
Qty: 30 TABLET | Refills: 0 | Status: SHIPPED | OUTPATIENT
Start: 2023-05-04 | End: 2023-06-27

## 2023-05-04 RX ORDER — GABAPENTIN 300 MG/1
300 CAPSULE ORAL AT BEDTIME
Qty: 90 CAPSULE | Refills: 0 | Status: SHIPPED | OUTPATIENT
Start: 2023-05-04 | End: 2023-06-01

## 2023-05-04 ASSESSMENT — PAIN SCALES - GENERAL: PAINLEVEL: NO PAIN (0)

## 2023-05-04 ASSESSMENT — ENCOUNTER SYMPTOMS
HEMATURIA: 0
NAUSEA: 1
HEARTBURN: 1
FEVER: 0
FREQUENCY: 0
SORE THROAT: 0
HEADACHES: 0
HEMATOCHEZIA: 0
DIARRHEA: 0
EYE PAIN: 0
COUGH: 0
JOINT SWELLING: 1
WEAKNESS: 0
CONSTIPATION: 0
CHILLS: 0
SHORTNESS OF BREATH: 0
PALPITATIONS: 0
DIZZINESS: 0
DYSURIA: 0
ARTHRALGIAS: 1
ABDOMINAL PAIN: 0
MYALGIAS: 0
PARESTHESIAS: 0
NERVOUS/ANXIOUS: 0

## 2023-05-04 ASSESSMENT — PATIENT HEALTH QUESTIONNAIRE - PHQ9: SUM OF ALL RESPONSES TO PHQ QUESTIONS 1-9: 5

## 2023-05-04 NOTE — PROGRESS NOTES
SUBJECTIVE:   CC: Andrew is an 42 year old who presents for preventative health visit.       2023     2:44 PM   Additional Questions   Roomed by Valorie HARRIS   Accompanied by no one         2023     2:44 PM   Patient Reported Additional Medications   Patient reports taking the following new medications none   Patient has been advised of split billing requirements and indicates understanding: Yes  Healthy Habits:     Getting at least 3 servings of Calcium per day:  Yes    Bi-annual eye exam:  Yes    Dental care twice a year:  NO    Sleep apnea or symptoms of sleep apnea:  Sleep apnea    Diet:  Regular (no restrictions)    Frequency of exercise:  None    Taking medications regularly:  Yes    Medication side effects:  Other    PHQ-2 Total Score: 0    Additional concerns today:  No              Today's PHQ-2 Score:       2023     6:02 PM   PHQ-2 (  [employer])   Q1: Little interest or pleasure in doing things 1   Q2: Feeling down, depressed or hopeless 0   PHQ-2 Score 1   Q1: Little interest or pleasure in doing things Several days   Q2: Feeling down, depressed or hopeless Not at all   PHQ-2 Score 1       Have you ever done Advance Care Planning? (For example, a Health Directive, POLST, or a discussion with a medical provider or your loved ones about your wishes): Yes, patient states has an Advance Care Planning document and will bring a copy to the clinic.    Social History     Tobacco Use     Smoking status: Former     Packs/day: 0.10     Years: 10.00     Pack years: 1.00     Types: Cigarettes     Start date: 1999     Quit date: 2014     Years since quittin.6     Smokeless tobacco: Never     Tobacco comments:     3 cigs day 13 years   Vaping Use     Vaping status: Never Used   Substance Use Topics     Alcohol use: Yes     Comment: Extremely rarely (major occassasions) bad affects on stomach             2023     6:02 PM   Alcohol Use   Prescreen: >3 drinks/day or >7 drinks/week? No       Last  "PSA: No results found for: PSA    Reviewed orders with patient. Reviewed health maintenance and updated orders accordingly - Yes  Lab work is in process  Labs reviewed in EPIC    Reviewed and updated as needed this visit by clinical staff   Tobacco  Allergies  Meds              Reviewed and updated as needed this visit by Provider                 Is not currently fasting.     Mood is better on 100mg of sertraline, but still finds he needs gabapentin or else he wakes in the night and can't fall back asleep.    Review of Systems   Constitutional: Negative for chills and fever.   HENT: Negative for congestion, ear pain, hearing loss and sore throat.    Eyes: Negative for pain and visual disturbance.   Respiratory: Negative for cough and shortness of breath.    Cardiovascular: Negative for chest pain, palpitations and peripheral edema.   Gastrointestinal: Positive for heartburn and nausea. Negative for abdominal pain, constipation, diarrhea and hematochezia.   Genitourinary: Negative for dysuria, frequency, genital sores, hematuria, impotence, penile discharge and urgency.   Musculoskeletal: Positive for arthralgias and joint swelling. Negative for myalgias.   Skin: Negative for rash.   Neurological: Negative for dizziness, weakness, headaches and paresthesias.   Psychiatric/Behavioral: Negative for mood changes. The patient is not nervous/anxious.          OBJECTIVE:   /71 (BP Location: Right arm, Patient Position: Sitting, Cuff Size: Adult Large)   Pulse 83   Temp 98.8  F (37.1  C) (Oral)   Resp 16   Ht 1.93 m (6' 4\")   Wt 90.7 kg (200 lb)   SpO2 97%   BMI 24.34 kg/m      Physical Exam  Vitals and nursing note reviewed.   Constitutional:       Appearance: He is well-developed.   HENT:      Head: Normocephalic and atraumatic.      Right Ear: Tympanic membrane, ear canal and external ear normal.      Left Ear: Tympanic membrane, ear canal and external ear normal.   Eyes:      Pupils: Pupils are equal, " round, and reactive to light.   Neck:      Thyroid: No thyromegaly.   Cardiovascular:      Rate and Rhythm: Normal rate and regular rhythm.      Heart sounds: Normal heart sounds.   Pulmonary:      Effort: Pulmonary effort is normal.      Breath sounds: Normal breath sounds.   Abdominal:      General: Bowel sounds are normal.      Palpations: Abdomen is soft. There is no mass.   Musculoskeletal:         General: No signs of injury. Normal range of motion.   Lymphadenopathy:      Cervical: No cervical adenopathy.   Skin:     General: Skin is warm and dry.      Findings: No rash.   Neurological:      Mental Status: He is alert and oriented to person, place, and time.   Psychiatric:         Judgment: Judgment normal.           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 His smoking use included cigarettes. He started smoking about 24 years ago. He has a 1.00 pack-year smoking history. He has never used smokeless tobacco.        Sabas Cerna MD  [facility]

## 2023-05-04 NOTE — PROGRESS NOTES
Prior to immunization administration, verified patients identity using patient s name and date of birth. Please see Immunization Activity for additional information.     Screening Questionnaire for Adult Immunization    Are you sick today?   No   Do you have allergies to medications, food, a vaccine component or latex?   No   Have you ever had a serious reaction after receiving a vaccination?   No   Do you have a long-term health problem with heart, lung, kidney, or metabolic disease (e.g., diabetes), asthma, a blood disorder, no spleen, complement component deficiency, a cochlear implant, or a spinal fluid leak?  Are you on long-term aspirin therapy?   No   Do you have cancer, leukemia, HIV/AIDS, or any other immune system problem?   No   Do you have a parent, brother, or sister with an immune system problem?   No   In the past 3 months, have you taken medications that affect  your immune system, such as prednisone, other steroids, or anticancer drugs; drugs for the treatment of rheumatoid arthritis, Crohn s disease, or psoriasis; or have you had radiation treatments?   No   Have you had a seizure, or a brain or other nervous system problem?   No   During the past year, have you received a transfusion of blood or blood    products, or been given immune (gamma) globulin or antiviral drug?   No   For women: Are you pregnant or is there a chance you could become       pregnant during the next month?   No   Have you received any vaccinations in the past 4 weeks?   No     Immunization questionnaire answers were all negative.      Injection of Tdap given by Valorie Redman MA. Patient instructed to remain in clinic for 15 minutes afterwards, and to report any adverse reactions.     Screening performed by Valorie Redman MA on 5/4/2023 at 3:38 PM.

## 2023-05-09 ENCOUNTER — MYC REFILL (OUTPATIENT)
Dept: FAMILY MEDICINE | Facility: CLINIC | Age: 43
End: 2023-05-09
Payer: COMMERCIAL

## 2023-05-09 DIAGNOSIS — M35.2 BEHCET'S DISEASE (H): ICD-10-CM

## 2023-05-09 DIAGNOSIS — F41.1 GENERALIZED ANXIETY DISORDER: ICD-10-CM

## 2023-05-10 RX ORDER — GABAPENTIN 300 MG/1
300 CAPSULE ORAL AT BEDTIME
Qty: 90 CAPSULE | Refills: 0 | OUTPATIENT
Start: 2023-05-10

## 2023-05-17 ENCOUNTER — LAB (OUTPATIENT)
Dept: LAB | Facility: CLINIC | Age: 43
End: 2023-05-17
Payer: COMMERCIAL

## 2023-05-17 DIAGNOSIS — Z00.00 ENCOUNTER FOR PREVENTIVE CARE: ICD-10-CM

## 2023-05-17 LAB
ANION GAP SERPL CALCULATED.3IONS-SCNC: 10 MMOL/L (ref 7–15)
BUN SERPL-MCNC: 19.9 MG/DL (ref 6–20)
CALCIUM SERPL-MCNC: 9.6 MG/DL (ref 8.6–10)
CHLORIDE SERPL-SCNC: 107 MMOL/L (ref 98–107)
CREAT SERPL-MCNC: 1.08 MG/DL (ref 0.67–1.17)
DEPRECATED HCO3 PLAS-SCNC: 23 MMOL/L (ref 22–29)
GFR SERPL CREATININE-BSD FRML MDRD: 88 ML/MIN/1.73M2
GLUCOSE SERPL-MCNC: 95 MG/DL (ref 70–99)
POTASSIUM SERPL-SCNC: 4 MMOL/L (ref 3.4–5.3)
SODIUM SERPL-SCNC: 140 MMOL/L (ref 136–145)

## 2023-05-17 PROCEDURE — 36415 COLL VENOUS BLD VENIPUNCTURE: CPT

## 2023-05-17 PROCEDURE — 80048 BASIC METABOLIC PNL TOTAL CA: CPT

## 2023-05-21 ASSESSMENT — PATIENT HEALTH QUESTIONNAIRE - PHQ9
SUM OF ALL RESPONSES TO PHQ QUESTIONS 1-9: 6
10. IF YOU CHECKED OFF ANY PROBLEMS, HOW DIFFICULT HAVE THESE PROBLEMS MADE IT FOR YOU TO DO YOUR WORK, TAKE CARE OF THINGS AT HOME, OR GET ALONG WITH OTHER PEOPLE: SOMEWHAT DIFFICULT
SUM OF ALL RESPONSES TO PHQ QUESTIONS 1-9: 6

## 2023-05-22 ENCOUNTER — VIRTUAL VISIT (OUTPATIENT)
Dept: PSYCHOLOGY | Facility: CLINIC | Age: 43
End: 2023-05-22
Attending: FAMILY MEDICINE
Payer: COMMERCIAL

## 2023-05-22 DIAGNOSIS — F41.1 GENERALIZED ANXIETY DISORDER: Primary | ICD-10-CM

## 2023-05-22 DIAGNOSIS — F90.2 ATTENTION DEFICIT HYPERACTIVITY DISORDER, COMBINED TYPE: ICD-10-CM

## 2023-05-22 PROCEDURE — 90834 PSYTX W PT 45 MINUTES: CPT | Mod: VID | Performed by: PSYCHOLOGIST

## 2023-05-23 NOTE — PROGRESS NOTES
[facility]   Mental Health & Addiction Services     Progress Note - Initial Visit    Patient  Name:  Serjio Farris Date: 23         Service Type: Individual     Visit Start Time: 4:00pm Visit End Time: 4:52pm    Visit #: 1 52 minutes    Attendees: Client attended alone    Service Modality:  Video Visit:      Provider verified identity through the following two step process.  Patient provided:  Patient photo and Patient     Telemedicine Visit: The patient's condition can be safely assessed and treated via synchronous audio and visual telemedicine encounter.      Reason for Telemedicine Visit: Services only offered telehealth    Originating Site (Patient Location): Patient's home    Distant Site (Provider Location): Provider Remote Setting- Home Office    Consent:  The patient/guardian has verbally consented to: the potential risks and benefits of telemedicine (video visit) versus in person care; bill my insurance or make self-payment for services provided; and responsibility for payment of non-covered services.     Patient would like the video invitation sent by:  Send to e-mail at: [email or web address]    Mode of Communication:  Video Conference via [facility]    Distant Location (Provider):  Off-site    As the provider I attest to compliance with applicable laws and regulations related to telemedicine.       DATA:   Interactive Complexity: No   Crisis: No     Presenting Concerns/  Current Stressors:   Client struggles with sustaining attention, concentration, and focus.      ASSESSMENT:  Mental Status Assessment:  Appearance:   Appropriate   Eye Contact:   Good   Psychomotor Behavior: Normal   Attitude:   Cooperative  Friendly Pleasant  Orientation:   All  Speech   Rate / Production: Normal/ Responsive   Volume:  Normal   Mood:    Anxious  Normal  Affect:    Appropriate   Thought Content:  Clear   Thought Form:  Coherent   Insight:    Good  and Fair       Safety Issues and Plan for  Safety and Risk Management:   [place] Suicide Severity Rating Scale (Lifetime/Recent)      5/22/2023     4:13 PM   [place] Suicide Severity Rating (Lifetime/Recent)   1. Wish to be Dead (Lifetime) N   2. Non-Specific Active Suicidal Thoughts (Lifetime) N   Actual Attempt (Lifetime) N   Has subject engaged in non-suicidal self-injurious behavior? (Lifetime) N   Interrupted Attempts (Lifetime) N   Aborted or Self-Interrupted Attempt (Lifetime) N   Preparatory Acts or Behavior (Lifetime) N   Calculated C-SSRS Risk Score (Lifetime/Recent) No Risk Indicated     Patient denies current fears or concerns for personal safety.  Patient denies current or recent suicidal ideation or behaviors.  Patient denies current or recent homicidal ideation or behaviors.  Patient denies current or recent self injurious behavior or ideation.  Patient denies other safety concerns.  Recommended that patient call 911 or go to the local ED should there be a change in any of these risk factors.  Patient reports there are no firearms in the house.     Diagnostic Criteria:  Generalized Anxiety Disorder  A. Excessive anxiety and worry about a number of events or activities (such as work or school performance).   B. The person finds it difficult to control the worry.  C. Select 3 or more symptoms (required for diagnosis). Only one item is required in children.   - Restlessness or feeling keyed up or on edge.    - Being easily fatigued.    - Difficulty concentrating or mind going blank.    - Irritability.    - Sleep disturbance (difficulty falling or staying asleep, or restless unsatisfying sleep).   D. The focus of the anxiety and worry is not confined to features of an Axis I disorder.  E. The anxiety, worry, or physical symptoms cause clinically significant distress or impairment in social, occupational, or other important areas of functioning.   F. The disturbance is not due to the direct physiological effects of a substance (e.g., a drug of  abuse, a medication) or a general medical condition (e.g., hyperthyroidism) and does not occur exclusively during a Mood Disorder, a Psychotic Disorder, or a Pervasive Developmental Disorder.    - The aformentioned symptoms began 15 year(s) ago and occurs 2 days per week and is experienced as moderate.      DSM5 Diagnoses: (Sustained by DSM5 Criteria Listed Above)  Diagnoses: 300.02 (F41.1) Generalized Anxiety Disorder  Psychosocial & Contextual Factors: Client lives with his wife and has a fairly new job.  Assessments completed prior to visit:  The following assessments were completed by patient for this visit:  PHQ9:       7/12/2022    10:39 PM 8/28/2022     4:11 PM 11/22/2022     6:31 PM 2/20/2023     3:47 PM 3/10/2023     3:34 PM 5/4/2023     2:47 PM 5/21/2023     7:44 PM   PHQ-9 SCORE   PHQ-9 Total Score MyChart 18 (Moderately severe depression) 12 (Moderate depression) 10 (Moderate depression)  11 (Moderate depression)  6 (Mild depression)   PHQ-9 Total Score 18 12 10 13 11 5 6     GAD7:       7/12/2022    10:39 PM 8/28/2022     4:06 PM 11/22/2022     6:31 PM 3/10/2023     3:34 PM 5/22/2023     4:11 PM   WILMAR-7 SCORE   Total Score 15 (severe anxiety) 11 (moderate anxiety) 7 (mild anxiety) 6 (mild anxiety)    Total Score 15 11 7 6 6     CAGE-AID:       5/15/2023     8:46 PM   CAGE-AID Total Score   Total Score 0   Total Score MyChart 0 (A total score of 2 or greater is considered clinically significant)     PROMIS 10-Global Health (all questions and answers displayed):       5/15/2023     8:45 PM   PROMIS 10   In general, would you say your health is: Good   In general, would you say your quality of life is: Good   In general, how would you rate your physical health? Fair   In general, how would you rate your mental health, including your mood and your ability to think? Good   In general, how would you rate your satisfaction with your social activities and relationships? Fair   In general, please rate how well  you carry out your usual social activities and roles Fair   To what extent are you able to carry out your everyday physical activities such as walking, climbing stairs, carrying groceries, or moving a chair? Moderately   In the past 7 days, how often have you been bothered by emotional problems such as feeling anxious, depressed, or irritable? Rarely   In the past 7 days, how would you rate your fatigue on average? Mild   In the past 7 days, how would you rate your pain on average, where 0 means no pain, and 10 means worst imaginable pain? 3   In general, would you say your health is: 3   In general, would you say your quality of life is: 3   In general, how would you rate your physical health? 2   In general, how would you rate your mental health, including your mood and your ability to think? 3   In general, how would you rate your satisfaction with your social activities and relationships? 2   In general, please rate how well you carry out your usual social activities and roles. (This includes activities at home, at work and in your community, and responsibilities as a parent, child, spouse, employee, friend, etc.) 2   To what extent are you able to carry out your everyday physical activities such as walking, climbing stairs, carrying groceries, or moving a chair? 3   In the past 7 days, how often have you been bothered by emotional problems such as feeling anxious, depressed, or irritable? 2   In the past 7 days, how would you rate your fatigue on average? 2   In the past 7 days, how would you rate your pain on average, where 0 means no pain, and 10 means worst imaginable pain? 3   Global Mental Health Score 12   Global Physical Health Score 13   PROMIS TOTAL - SUBSCORES 25     [place] Suicide Severity Rating Scale (Lifetime/Recent)      5/22/2023     4:13 PM   [place] Suicide Severity Rating (Lifetime/Recent)   1. Wish to be Dead (Lifetime) N   2. Non-Specific Active Suicidal Thoughts (Lifetime) N   Actual  Attempt (Lifetime) N   Has subject engaged in non-suicidal self-injurious behavior? (Lifetime) N   Interrupted Attempts (Lifetime) N   Aborted or Self-Interrupted Attempt (Lifetime) N   Preparatory Acts or Behavior (Lifetime) N   Calculated C-SSRS Risk Score (Lifetime/Recent) No Risk Indicated       WHODAS 2.0 (12 item):        View : No data to display.              Intervention:   Psychoeducation; Skill review/identification & recommendation; Pattern recognition; Socratic Questioning; Active listening, validation, and other rapport building skills; Administer and explain screeners; Rescheduled second session for earlier date  Collateral Reports Completed:  Not Applicable      PLAN: (Homework, other):  1. Provider will continue Diagnostic Assessment.  Patient was given the following to do until next session:  The client was sent the MMPI-2, CNS Vital Signs, and ADHD questionnaires to complete and return by next session, if possible.    2. Provider recommended the following referrals: None at this time.      3.  Suicide Risk and Safety Concerns were assessed for Serjio Farris.    Patient meets the following risk assessment and triage: Patient denied any current/recent/lifetime history of suicidal ideation and/or behaviors.  No safety plan indicated at this time.       Sarath Ryder PsyD  May 22, 2023

## 2023-05-25 ENCOUNTER — TELEPHONE (OUTPATIENT)
Dept: RHEUMATOLOGY | Facility: CLINIC | Age: 43
End: 2023-05-25
Payer: COMMERCIAL

## 2023-05-25 NOTE — TELEPHONE ENCOUNTER
Call back received from pt and wife: lab appt scheduled 5/31/23, follow-up with Dr. Vann scheduled 9/15/23.

## 2023-05-25 NOTE — TELEPHONE ENCOUNTER
PAUL and sudheer message senrt for the patient to call back and schedule the following:    Appointment type: Return  Provider: Dr. Vann  Return date: August/September 2023  Specialty phone number: [phone]  Additional appointment(s) needed:You will need a lab appointment in the next week or two.   Additonal Notes: Follow-up with Dr. Vann in August/September (video OK, can use FABIAN slot if needed)

## 2023-05-30 ENCOUNTER — LAB (OUTPATIENT)
Dept: LAB | Facility: CLINIC | Age: 43
End: 2023-05-30
Payer: COMMERCIAL

## 2023-05-30 ENCOUNTER — MYC MEDICAL ADVICE (OUTPATIENT)
Dept: FAMILY MEDICINE | Facility: CLINIC | Age: 43
End: 2023-05-30

## 2023-05-30 DIAGNOSIS — M35.2 BEHCET'S DISEASE (H): ICD-10-CM

## 2023-05-30 DIAGNOSIS — I77.6 AORTITIS (H): ICD-10-CM

## 2023-05-30 LAB
ALBUMIN SERPL BCG-MCNC: 4.3 G/DL (ref 3.5–5.2)
ALP SERPL-CCNC: 58 U/L (ref 40–129)
ALT SERPL W P-5'-P-CCNC: 21 U/L (ref 10–50)
ANION GAP SERPL CALCULATED.3IONS-SCNC: 12 MMOL/L (ref 7–15)
AST SERPL W P-5'-P-CCNC: 23 U/L (ref 10–50)
BASOPHILS # BLD AUTO: 0 10E3/UL (ref 0–0.2)
BASOPHILS NFR BLD AUTO: 0 %
BILIRUB SERPL-MCNC: 0.4 MG/DL
BUN SERPL-MCNC: 16.3 MG/DL (ref 6–20)
CALCIUM SERPL-MCNC: 9.2 MG/DL (ref 8.6–10)
CHLORIDE SERPL-SCNC: 105 MMOL/L (ref 98–107)
CREAT SERPL-MCNC: 1.24 MG/DL (ref 0.67–1.17)
CRP SERPL-MCNC: <3 MG/L
DEPRECATED HCO3 PLAS-SCNC: 24 MMOL/L (ref 22–29)
EOSINOPHIL # BLD AUTO: 0.1 10E3/UL (ref 0–0.7)
EOSINOPHIL NFR BLD AUTO: 1 %
ERYTHROCYTE [DISTWIDTH] IN BLOOD BY AUTOMATED COUNT: 12.2 % (ref 10–15)
ERYTHROCYTE [SEDIMENTATION RATE] IN BLOOD BY WESTERGREN METHOD: 6 MM/HR (ref 0–15)
GFR SERPL CREATININE-BSD FRML MDRD: 75 ML/MIN/1.73M2
GLUCOSE SERPL-MCNC: 90 MG/DL (ref 70–99)
HCT VFR BLD AUTO: 37.2 % (ref 40–53)
HGB BLD-MCNC: 13.4 G/DL (ref 13.3–17.7)
IMM GRANULOCYTES # BLD: 0 10E3/UL
IMM GRANULOCYTES NFR BLD: 0 %
LYMPHOCYTES # BLD AUTO: 1.6 10E3/UL (ref 0.8–5.3)
LYMPHOCYTES NFR BLD AUTO: 24 %
MCH RBC QN AUTO: 31 PG (ref 26.5–33)
MCHC RBC AUTO-ENTMCNC: 36 G/DL (ref 31.5–36.5)
MCV RBC AUTO: 86 FL (ref 78–100)
MONOCYTES # BLD AUTO: 0.4 10E3/UL (ref 0–1.3)
MONOCYTES NFR BLD AUTO: 6 %
NEUTROPHILS # BLD AUTO: 4.5 10E3/UL (ref 1.6–8.3)
NEUTROPHILS NFR BLD AUTO: 69 %
PLATELET # BLD AUTO: 188 10E3/UL (ref 150–450)
POTASSIUM SERPL-SCNC: 3.8 MMOL/L (ref 3.4–5.3)
PROT SERPL-MCNC: 5.7 G/DL (ref 6.4–8.3)
RBC # BLD AUTO: 4.32 10E6/UL (ref 4.4–5.9)
SODIUM SERPL-SCNC: 141 MMOL/L (ref 136–145)
WBC # BLD AUTO: 6.6 10E3/UL (ref 4–11)

## 2023-05-30 PROCEDURE — 85025 COMPLETE CBC W/AUTO DIFF WBC: CPT

## 2023-05-30 PROCEDURE — 82784 ASSAY IGA/IGD/IGG/IGM EACH: CPT

## 2023-05-30 PROCEDURE — 82787 IGG 1 2 3 OR 4 EACH: CPT

## 2023-05-30 PROCEDURE — 80053 COMPREHEN METABOLIC PANEL: CPT

## 2023-05-30 PROCEDURE — 85652 RBC SED RATE AUTOMATED: CPT

## 2023-05-30 PROCEDURE — 86140 C-REACTIVE PROTEIN: CPT

## 2023-05-30 PROCEDURE — 36415 COLL VENOUS BLD VENIPUNCTURE: CPT

## 2023-06-02 LAB
IGA SERPL-MCNC: 207 MG/DL (ref 84–499)
IGG SERPL-MCNC: 750 MG/DL (ref 610–1616)
IGG SERPL-MCNC: 750 MG/DL (ref 610–1616)
IGG1 SER-MCNC: 367 MG/DL (ref 382–929)
IGG2 SER-MCNC: 245 MG/DL (ref 242–700)
IGG3 SER-MCNC: 27 MG/DL (ref 22–176)
IGG4 SER-MCNC: 31 MG/DL (ref 4–86)
IGM SERPL-MCNC: 103 MG/DL (ref 35–242)
SUBCLASSES, PERCENT: 89 %

## 2023-06-06 ENCOUNTER — MYC MEDICAL ADVICE (OUTPATIENT)
Dept: RHEUMATOLOGY | Facility: CLINIC | Age: 43
End: 2023-06-06
Payer: COMMERCIAL

## 2023-06-06 DIAGNOSIS — M35.2 BEHCET'S DISEASE (H): ICD-10-CM

## 2023-06-07 NOTE — TELEPHONE ENCOUNTER
"Patient reporting symptoms from Bechet's, \" Lots of sores in my mouth, nose, and pains/discomfort in my stomach\"   Reports he has used the Prednisone tapers for flares a couple of times since 5/09 when he had reduced the apremilast (Otezla) to 30mg daily.  And again, as recent as this last week because the symptoms were so bad.      Current Meds:   Mitgare 0.6mg twice daily   Omeprazole 40mg daily  Prednisone taper prn flares (50mg x3>40mg x3>30mg x3, etc).    4/14  If his reflux symptoms do not improve after starting Prilosec and then our plan will be to reduce his dose of apremilast from 30 mg twice daily down to 30 mg once daily while continuing on Prilosec.  If we need to transition away from apremilast our plan will be to use combination azathioprine along with adalimumab.  He will update me in the next few weeks and let me know how his symptoms are going.      "

## 2023-06-08 ENCOUNTER — OFFICE VISIT (OUTPATIENT)
Dept: FAMILY MEDICINE | Facility: CLINIC | Age: 43
End: 2023-06-08
Payer: COMMERCIAL

## 2023-06-08 VITALS
WEIGHT: 193 LBS | RESPIRATION RATE: 16 BRPM | HEART RATE: 72 BPM | TEMPERATURE: 97.1 F | OXYGEN SATURATION: 97 % | SYSTOLIC BLOOD PRESSURE: 122 MMHG | DIASTOLIC BLOOD PRESSURE: 71 MMHG | BODY MASS INDEX: 23.5 KG/M2 | HEIGHT: 76 IN

## 2023-06-08 DIAGNOSIS — L03.012 INFECTION OF FINGERNAIL OF LEFT HAND: Primary | ICD-10-CM

## 2023-06-08 DIAGNOSIS — M35.2 BEHCET'S DISEASE (H): ICD-10-CM

## 2023-06-08 PROCEDURE — 99213 OFFICE O/P EST LOW 20 MIN: CPT

## 2023-06-08 RX ORDER — DOXYCYCLINE 100 MG/1
100 CAPSULE ORAL 2 TIMES DAILY
Qty: 14 CAPSULE | Refills: 0 | Status: SHIPPED | OUTPATIENT
Start: 2023-06-08 | End: 2023-06-15

## 2023-06-08 ASSESSMENT — PAIN SCALES - GENERAL: PAINLEVEL: MODERATE PAIN (5)

## 2023-06-08 NOTE — PROGRESS NOTES
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 Suspect may have development of abscess developing below the cuticle. Shared decision to defer I & D for now. Recent visit to  noting Doxycycline prescription for same fingernail infection, tolerated well given Behcet's disease. Patient would like to have same antibiotic given stomach sensitivity w/ Behcet's disease. Discussed medication risks and benefits of Doxycycline with patient in detail with patient verbal understanding. Discussed having patient follow up if not noting any improvement and at that point may have to consider I & D. Patient fully understands and is agreeable with plan of care, at this point patient will follow up as needed unless acute concerns arise in the meantime.  Plan: doxycycline hyclate (VIBRAMYCIN) 100 MG capsule          NATALIIA Jain CNP  [facility]    Dameon Morales is a 42 year old, presenting for the following health issues:  Finger (Infected fingernail)        6/8/2023     2:57 PM   Additional Questions   Roomed by Kristin OLIVEIRA CMA     History of Present Illness       Reason for visit:  Damaged nail    He eats 2-3 servings of fruits and vegetables daily.He consumes 2 sweetened beverage(s) daily.He exercises with enough effort to increase his heart rate 20 to 29 minutes per day.  He exercises with enough effort to increase his heart rate 4 days per week.   He is taking medications regularly.     Left ring finger infected. Was given antibiotics prior to this visit and it went away but now it is back and puffy again.   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   Denies fever, chills, myalgias, purulent drainage.     Review of Systems   Constitutional, GI, , musculoskeletal, skin, and psych systems are negative, except as otherwise noted.      Objective    BP  "122/71 (BP Location: Right arm, Patient Position: Sitting, Cuff Size: Adult Large)   Pulse 72   Temp 97.1  F (36.2  C) (Temporal)   Resp 16   Ht 1.93 m (6' 4\")   Wt 87.5 kg (193 lb)   SpO2 97%   BMI 23.49 kg/m    Body mass index is 23.49 kg/m .  Physical Exam  Vitals and nursing note reviewed.   Constitutional:       General: He is not in acute distress.     Appearance: Normal appearance. He is not ill-appearing.   Cardiovascular:      Rate and Rhythm: Normal rate.   Pulmonary:      Effort: Pulmonary effort is normal.   Skin:     General: Skin is warm and dry.      Findings: Wound present.      Comments: Left ring fingernail and DIP swollen, erythematous, and tender to touch   Neurological:      Mental Status: He is alert.   Psychiatric:         Mood and Affect: Mood normal.         Behavior: Behavior normal.         Thought Content: Thought content normal.         Judgment: Judgment normal.                "

## 2023-06-09 RX ORDER — PREDNISONE 10 MG/1
TABLET ORAL
Qty: 45 TABLET | Refills: 0 | Status: SHIPPED | OUTPATIENT
Start: 2023-06-09 | End: 2023-06-24

## 2023-06-09 NOTE — TELEPHONE ENCOUNTER
Discussed patient message with Dr. Vann and MyC message sent to patient to relay provider's message and update on the Pred taper refill per provider's verbal orders to refill.    Haley Brink RN  [facility]

## 2023-06-22 DIAGNOSIS — F41.1 GENERALIZED ANXIETY DISORDER: ICD-10-CM

## 2023-06-25 ENCOUNTER — MYC REFILL (OUTPATIENT)
Dept: FAMILY MEDICINE | Facility: CLINIC | Age: 43
End: 2023-06-25
Payer: COMMERCIAL

## 2023-06-25 DIAGNOSIS — F41.1 GENERALIZED ANXIETY DISORDER: ICD-10-CM

## 2023-06-25 RX ORDER — TRAZODONE HYDROCHLORIDE 50 MG/1
50 TABLET, FILM COATED ORAL AT BEDTIME
Qty: 30 TABLET | Refills: 0 | Status: CANCELLED | OUTPATIENT
Start: 2023-06-25

## 2023-06-26 ASSESSMENT — PATIENT HEALTH QUESTIONNAIRE - PHQ9
SUM OF ALL RESPONSES TO PHQ QUESTIONS 1-9: 5
SUM OF ALL RESPONSES TO PHQ QUESTIONS 1-9: 5
10. IF YOU CHECKED OFF ANY PROBLEMS, HOW DIFFICULT HAVE THESE PROBLEMS MADE IT FOR YOU TO DO YOUR WORK, TAKE CARE OF THINGS AT HOME, OR GET ALONG WITH OTHER PEOPLE: SOMEWHAT DIFFICULT

## 2023-06-27 ENCOUNTER — VIRTUAL VISIT (OUTPATIENT)
Dept: PSYCHOLOGY | Facility: CLINIC | Age: 43
End: 2023-06-27
Payer: COMMERCIAL

## 2023-06-27 DIAGNOSIS — F41.1 GENERALIZED ANXIETY DISORDER: Primary | ICD-10-CM

## 2023-06-27 DIAGNOSIS — F32.9: ICD-10-CM

## 2023-06-27 DIAGNOSIS — F90.0 ATTENTION DEFICIT HYPERACTIVITY DISORDER, INATTENTIVE TYPE: ICD-10-CM

## 2023-06-27 PROCEDURE — 90791 PSYCH DIAGNOSTIC EVALUATION: CPT | Mod: VID | Performed by: PSYCHOLOGIST

## 2023-06-27 RX ORDER — TRAZODONE HYDROCHLORIDE 50 MG/1
TABLET, FILM COATED ORAL
Qty: 90 TABLET | Refills: 1 | Status: SHIPPED | OUTPATIENT
Start: 2023-06-27 | End: 2023-11-21

## 2023-06-27 NOTE — TELEPHONE ENCOUNTER
Prescription approved per [facility] Refill Protocol.  Mary Jane Vazquez RN, BSN  [facility]

## 2023-06-27 NOTE — PROGRESS NOTES
[facility]    Provider Name:  Sarath Ryder PsyD     Credentials:  [facility] ADULT ADHD/Mental Health DIAGNOSTIC ASSESSMENT      Patient Name:  Serjio Farris  Date: 23  PREFERRED NAME: Andrew  PRONOUNS:   he/him/his    MRN: [id number]  : 1980  ADDRESS: [address]  [place] [address]  ACCT. NUMBER:  [id number]  PREFERRED PHONE: [phone]  May we leave a program related message: Yes         Service Type: Individual      Session Start Time: 7:02am Session End Time: 7:20am     Session Length: 18 minutes    Session #: 2    Attendees: Client attended alone    Service Modality:  Video Visit:      Provider verified identity through the following two step process.  Patient provided:  Patient photo    Telemedicine Visit: The patient's condition can be safely assessed and treated via synchronous audio and visual telemedicine encounter.      Reason for Telemedicine Visit: Services only offered telehealth    Originating Site (Patient Location): Patient's home    Distant Site (Provider Location): Provider Remote Setting- Home Office    Consent:  The patient/guardian has verbally consented to: the potential risks and benefits of telemedicine (video visit) versus in person care; bill my insurance or make self-payment for services provided; and responsibility for payment of non-covered services.     Patient would like the video invitation sent by:  Send to e-mail at: [email or web address]    Mode of Communication:  Video Conference via Amwell    Distant Location (Provider):  Off-site    As the provider I attest to compliance with applicable laws and regulations related to telemedicine.    DATA  Interactive Complexity: No  Crisis: No      Identifying Information:  Patient is a 42 year old,   individual.  Patient was referred for an assessment by family.  Patient attended the session alone.    Chief Complaint:   The purpose of this evaluation is to: clarify diagnosis.  "Patient reported seeking services at this time for diagnostic assessment and recommendations for treatment.  Patient reported that has not completed a previous ADHD diagnostic assessment.  Patient has not received a previous diagnosis of ADHD. Patient reported that medication has not been prescribed medication to address these problems.     Reason for Referral:  \"I lost my job about a year ago, or switched careers, that didn't work out.  I'm at a new job and loving it.  My sisters always told me I've got ADHD.  She's a teacher.  When I moved to the [place] [place] [facility]", we've got a family psychiatrist who thought I've got it.  I've got a few friends here who say I've got it.  I get distracted quite easily.  I've always been like that as a kid.  I didn't do that well because of that (inattention).  Listening to instructions or if someone's having a talk or something within a few minutes I'll be drifting off.  Or finishing a task, I'll do a full Solomon to get something done.  I'll go to the garage to do something and come out with something else.  I can never sit still.  I have to get up and sit back down again.\"    Social/Family History:  Patient reported they grew up in other [facility].  They were raised by biological parents.  Parents  / .  Around when I was 15, they .  My father got remarried when I was around 17 or 18.  Patient reported that their childhood was \"pretty normal.  Happy child, happy family.\"  Patient described their current relationships with family of origin as (1 older sister [5 years]):  \"Very good.  I get along with all of them.  My dad and my mother don't speak.\"     Patient described his childhood family environment as nurturing and stable.  As a child, patient reported that he failed to complete assigned chores in the home environment, had problems getting ready for school in the morning, had problems with organization and keeping track of items, misplaced or " "lost things, forgot school work or other items between home and school and needed frequent reminders by parents to be motivated or to complete work. Patient reported no difficulty with childhood peer relationships.     The patient describes their cultural background as .  Cultural influences and impact on patient's life structure, values, norms, and healthcare: [place], school was always challaging for me due me not being able to concentrate in lessons. These Issues seemed to follow through my work career. ;[place]. Im  with 2 dogs and a cat and are currently in the adoption process.  Contextual influences on patient's health include: None reported.    These factors will be addressed in the Preliminary Treatment plan.  Patient identified their preferred language to be English. Patient reported they does not need the assistance of an  or other support involved in therapy.     Patient reported had no significant delays in developmental tasks.   Patient's highest education level was associate degree / vocational certificate. Patient identified the following learning problems: attention, concentration and \"When it came to tests, I would do miserably.\"'.  Modifications will not be used to assist communication in therapy.  Patient reports they are able to understand written materials.    Patient did not receive tutoring services during the school years. Patient did not receive special education services. Patient  reported significant behavior and discipline problems including: disruptive classroom behavior and frequent tardiness or absences and failure to finish or complete homework. Patient did attend post secondary school.     Patient reported the following relationship history of \"15\" committed and significant relationships.  Patient's current relationship status is  for \"10 years\".   Patient identified their sexual orientation as " "heterosexual.  Patient reported having no child(dorothy). Patient identified mother; pets; spouse as part of their support system.  Patient identified the quality of these relationships as stable and meaningful.      Patient's current living/housing situation involves staying in own home/apartment. The immediate members of family and household include Danielle collado, 35,Wife, two dogs, and a cat and they report that housing is stable    Patient is currently employed fulltime.  Patient reports their finances are obtained through employment. The patient's work history includes: \" [place] the [place], I was a  for 14 years.  When I moved here I didn't want to be in that field anymore.  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  That didn't work out.  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  Client has been terminated from a place of employment (\"In my early years [[age]], I just wouldn't show up for work\").  Patient does not identify finances as a current stressor.      Patient reported that theyhave not been involved with the legal system.  Patient does not being under probation/ parole/ jurisdiction. They are not under any current court jurisdiction. .    Patient has received a 's license.  Patient has not received any moving violations.  Patient reported the following driving habits: attentive and cautious, gets lost easily and runs through stop signs.  According to client, other people are comfortable riding as passengers when he is driving.     Patient's Strengths and Limitations:  Patient identified the following strengths or resources that will help them succeed in treatment: family support and \"I currently plan out my days.\". Things that may interfere with the patient's success in treatment include: none identified.     Assessments:  The following assessments were completed by patient for this visit:  PHQ9:       " 8/28/2022     4:11 PM 11/22/2022     6:31 PM 2/20/2023     3:47 PM 3/10/2023     3:34 PM 5/4/2023     2:47 PM 5/21/2023     7:44 PM 6/26/2023    10:59 AM   PHQ-9 SCORE   PHQ-9 Total Score MyChart 12 (Moderate depression) 10 (Moderate depression)  11 (Moderate depression)  6 (Mild depression) 5 (Mild depression)   PHQ-9 Total Score 12 10 13 11 5 6 5     GAD7:       7/12/2022    10:39 PM 8/28/2022     4:06 PM 11/22/2022     6:31 PM 3/10/2023     3:34 PM 5/22/2023     4:11 PM   WILMAR-7 SCORE   Total Score 15 (severe anxiety) 11 (moderate anxiety) 7 (mild anxiety) 6 (mild anxiety)    Total Score 15 11 7 6 6     CAGE-AID:       5/15/2023     8:46 PM   CAGE-AID Total Score   Total Score 0   Total Score MyChart 0 (A total score of 2 or greater is considered clinically significant)     PROMIS 10-Global Health (all questions and answers displayed):       5/15/2023     8:45 PM   PROMIS 10   In general, would you say your health is: Good   In general, would you say your quality of life is: Good   In general, how would you rate your physical health? Fair   In general, how would you rate your mental health, including your mood and your ability to think? Good   In general, how would you rate your satisfaction with your social activities and relationships? Fair   In general, please rate how well you carry out your usual social activities and roles Fair   To what extent are you able to carry out your everyday physical activities such as walking, climbing stairs, carrying groceries, or moving a chair? Moderately   In the past 7 days, how often have you been bothered by emotional problems such as feeling anxious, depressed, or irritable? Rarely   In the past 7 days, how would you rate your fatigue on average? Mild   In the past 7 days, how would you rate your pain on average, where 0 means no pain, and 10 means worst imaginable pain? 3   In general, would you say your health is: 3   In general, would you say your quality of life is: 3    In general, how would you rate your physical health? 2   In general, how would you rate your mental health, including your mood and your ability to think? 3   In general, how would you rate your satisfaction with your social activities and relationships? 2   In general, please rate how well you carry out your usual social activities and roles. (This includes activities at home, at work and in your community, and responsibilities as a parent, child, spouse, employee, friend, etc.) 2   To what extent are you able to carry out your everyday physical activities such as walking, climbing stairs, carrying groceries, or moving a chair? 3   In the past 7 days, how often have you been bothered by emotional problems such as feeling anxious, depressed, or irritable? 2   In the past 7 days, how would you rate your fatigue on average? 2   In the past 7 days, how would you rate your pain on average, where 0 means no pain, and 10 means worst imaginable pain? 3   Global Mental Health Score 12   Global Physical Health Score 13   PROMIS TOTAL - SUBSCORES 25     Blackburn Suicide Severity Rating Scale (Lifetime/Recent)      5/22/2023     4:13 PM   Blackburn Suicide Severity Rating (Lifetime/Recent)   1. Wish to be Dead (Lifetime) N   2. Non-Specific Active Suicidal Thoughts (Lifetime) N   Actual Attempt (Lifetime) N   Has subject engaged in non-suicidal self-injurious behavior? (Lifetime) N   Interrupted Attempts (Lifetime) N   Aborted or Self-Interrupted Attempt (Lifetime) N   Preparatory Acts or Behavior (Lifetime) N   Calculated C-SSRS Risk Score (Lifetime/Recent) No Risk Indicated       Personal and Family Medical History:  Patient does not report a family history of mental health concerns.  Patient reports family history includes Asthma in his paternal grandfather; Breast Cancer in his maternal grandmother; Cerebrovascular Disease in his paternal grandmother; Depression in his father, maternal grandfather, maternal grandmother,  "mother, and sister; Diabetes in his paternal grandmother; Other - See Comments in his paternal grandmother; Thyroid Disease in his maternal grandmother..     Patient does report Mental Health Diagnosis and/or Treatment.  Patient Patient reported the following previous diagnoses which include(s): an Anxiety Disorder and Depression.  Patient reported symptoms began: Depression (single episode?): Intense depression one year ago is when the client lost his job, \"I like to be busy.  I did become very depressed.  Very down.  I quit the [employer] and was fine for 2-3 weeks and it got worse and worse.  So, I took sertraline and it took maybe 3 weeks to kick in. Pretty low.  Didn't want to go out.  Didn't want to socialize.  Lost interest.  Less sleep (more about anxiety).  No other episodes of depression prior or since (on meds).  Anxiety (WILMAR, Poss social phobia):  Started, \"I've always been a worrier.  I don't like big crowds or being with people I don't know.  Am I going to say the wrong thing, say something embarrassing, especially being from a different country, are they going to understand.\"  This has been going on since school, \"Presenting in front of class.  I think I've gotten better as I've gotten older.\"   Patient has received mental health services in the past: medication and therapy.  Psychiatric Hospitalizations: None.  Patient denies a history of civil commitment.  Patient is receiving other mental health services.  These include medications.         Patient has had a physical exam to rule out medical causes for current symptoms.  Date of last physical exam was within the past year. Client was encouraged to follow up with PCP if symptoms were to develop. The patient has a [facility] Primary Care Provider, who is named No Ref-Primary, Physician..  Patient reports no current medical and/or dental concerns.  Patient reports pain concerns including \"I do have oral mouth sores and joint pain when I do have " "flare-up I take prednisone.\".  Patient does not want help addressing pain concerns..   There are not significant appetite / nutritional concerns / weight changes.   Patient does not report a history of head injury / trauma / cognitive impairment.    Patient reports current meds as:   No outpatient medications have been marked as taking for the 6/27/23 encounter (Appointment) with Sarath Ryder PsyD.       Medication Adherence:  Patient reports taking.    Patient Allergies:    Allergies   Allergen Reactions     Ibuprofen GI Disturbance       Medical History:    Past Medical History:   Diagnosis Date     Depressive disorder 2012/2013    Due to my stomach issues and not being sorted. OK now     Stroke (H) age 25 approx    possible         Current Mental Status Exam:   Appearance:  Appropriate    Eye Contact:  Good   Psychomotor:  Normal       Gait / station:  Unable to observe via video session  Attitude / Demeanor: Cooperative  Pleasant  Speech      Rate / Production: Normal/ Responsive      Volume:  Normal  volume      Language:  intact  Mood:   Normal  Affect:   Appropriate    Thought Content: Clear   Thought Process: Coherent       Associations: No loosening of associations  Insight:   Good   Judgment:  Intact   Orientation:  All  Attention/concentration: Good      Substance Use:  Patient did not report a family history of substance use concerns; see medical history section for details.  Patient has not received chemical dependency treatment in the past.  Patient has not ever been to detox.      Patient is not currently receiving any chemical dependency treatment.           Substance History of use Age of first use Date of last use     Pattern and duration of use (include amounts and frequency)   Alcohol never used       REPORTS SUBSTANCE USE: reports using substance 2 times per year and has 1 beer or mixed drink at a time.   Patient reports heaviest use is current use.   Cannabis   never used     REPORTS " "SUBSTANCE USE: N/A     Amphetamines   never used     REPORTS SUBSTANCE USE: N/A   Cocaine/crack    never used       REPORTS SUBSTANCE USE: N/A   Hallucinogens never used         REPORTS SUBSTANCE USE: N/A   Inhalants never used         REPORTS SUBSTANCE USE: N/A   Heroin never used         REPORTS SUBSTANCE USE: N/A   Other Opiates never used     REPORTS SUBSTANCE USE: N/A   Benzodiazepine   never used     REPORTS SUBSTANCE USE: N/A   Barbiturates never used     REPORTS SUBSTANCE USE: N/A   Over the counter meds used in the past 42 02/15/23 REPORTS SUBSTANCE USE: N/A   Caffeine currently use 13   REPORTS SUBSTANCE USE: reports using substance 2 times per day and has 1 coffee at a time.   Patient reports heaviest use was \"when I was in my 20s.\".   Nicotine  used in the past 25 10/15/14 REPORTS SUBSTANCE USE: N/A   Other substances not listed above:  Identify:  never used     REPORTS SUBSTANCE USE: N/A     Patient reported the following problems as a result of their substance use: no problems, not applicable.    Substance Use: No symptoms    Based on the negative CAGE score and clinical interview there  are not indications of drug or alcohol abuse.      Significant Losses / Trauma / Abuse / Neglect Issues:   Patient did not serve in the .  There are indications or report of significant loss, trauma, abuse or neglect issues related to: are no indications and client denies any losses, trauma, abuse, or neglect concerns.  Concerns for possible neglect are not present.    Safety Assessment:   Patient denies current homicidal ideation and behaviors.  Patient denies current self-injurious ideation and behaviors.    Patient denied risk behaviors associated with substance use.  Patient denies any high risk behaviors associated with mental health symptoms.  Patient reports the following current concerns for their personal safety: None.  Patient reports there are not firearms in the house.    History of Safety " Concerns:  Patient denied a history of homicidal ideation.     Patient denied a history of personal safety concerns.    Patient denied a history of assaultive behaviors.    Patient denied a history of sexual assault behaviors.     Patient denied a history of risk behaviors associated with substance use.  Patient denies any history of high risk behaviors associated with mental health symptoms.  Patient reports the following protective factors: living with other people; structured day; healthy fear of risky behaviors or pain    Risk Plan:  See Recommendations for Safety and Risk Management Plan    Review of Symptoms per patient report:   Depression: Lack of interest, Change in energy level, Psychomotor slowing or agitation and Feeling sad, down, or depressed  Clarita:  No Symptoms  Psychosis: No Symptoms  Anxiety: Excessive worry and Nervousness  Panic:  No symptoms  Post Traumatic Stress Disorder:  No Symptoms   Eating Disorder: No Symptoms  ADD / ADHD:  Inattentive, Difficulties listening, Poor task completion, Poor organizational skills and Distractibility  Conduct Disorder: No symptoms  Autism Spectrum Disorder: No symptoms  Obsessive Compulsive Disorder: No Symptoms    Patient reports the following compulsive behaviors and treatment history: None reported.      Diagnostic Criteria:   Generalized Anxiety Disorder  A. Excessive anxiety and worry about a number of events or activities (such as work or school performance).   B. The person finds it difficult to control the worry.  C. Select 3 or more symptoms (required for diagnosis). Only one item is required in children.   - Restlessness or feeling keyed up or on edge.    - Being easily fatigued.    - Difficulty concentrating or mind going blank.    - Irritability.    - Sleep disturbance (difficulty falling or staying asleep, or restless unsatisfying sleep).   D. The focus of the anxiety and worry is not confined to features of an Axis I disorder.  E. The anxiety,  worry, or physical symptoms cause clinically significant distress or impairment in social, occupational, or other important areas of functioning.   F. The disturbance is not due to the direct physiological effects of a substance (e.g., a drug of abuse, a medication) or a general medical condition (e.g., hyperthyroidism) and does not occur exclusively during a Mood Disorder, a Psychotic Disorder, or a Pervasive Developmental Disorder.    - The aformentioned symptoms began 15 year(s) ago and occurs 2 days per week and is experienced as moderate. Unspecified Depressive Disorder Attention Deficit Hyperactivity Disorder  A) A persistent pattern of inattention and/or hyperactivity-impulsivity that interferes with functioning or development, as characterized by (1) Inattention and/or (2) Hyperactivity and Impulsivity  (1) Inattention: 6 or more of the following symptoms have persisted for at least 6 months to a degree that is inconsistent with developmental level and that negatively impacts directly on social and academic/occupational activities:  - Often has difficulty sustaining attention in tasks or play activities  - Often does not seem to listen when spoken to directly  - Often avoids, dislikes, or is reluctant to engage in tasks that require sustained mental effort  - Is often easily distractedby extraneous stimuli  - Is often forgetful in daily activities  E) The Symptoms do not occur exclusively during the course of schizophrenia or another psychotic disorder and are not better explained by another mental disorder    Functional Status:  Patient reports the following functional impairments:  management of the household and or completion of tasks, relationship(s) and work / vocational responsibilities.     Nonprogrammatic care:  Patient is requesting basic services to address current mental health concerns.    Clinical Summary:  1. Reason for assessment: ADHD assessment/clarify diagnosis.  2. Psychosocial, Cultural  and Contextual Factors: Client lived in  until 2014.  Works full-[employer] r and lives with his wife.  3. Principal DSM5 Diagnoses  (Sustained by DSM5 Criteria Listed Above):   300.02 (F41.1) Generalized Anxiety Disorder.  4. Other Diagnoses that is relevant to services:   311 (F32.9) Unspecified Depressive Disorder .  5. Provisional Diagnosis:  Attention-Deficit/Hyperactivity Disorder  314.00 (F90.0) Predominantly inattentive presentation as evidenced by difficulty sustaining attention and concentration; symptoms present since childhood.  6. Prognosis: Expect Improvement.  7. Likely consequences of symptoms if not treated: If left untreated, the client is likely to struggle with maintaining consistent gainful employment and will continue to struggle getting tasks done around the house.  8. Client strengths include:  employed, goal-focused, good listener, insightful, motivated, open to learning and open to suggestions / feedback .     Recommendations:     1. Plan for Safety and Risk Management:   Safety and Risk: Recommended that patient call 911 or go to the local ED should there be a change in any of these risk factors..          Report to child / adult protection services was NA.     2. Patient's identified no cultural infuences to address at this time.     3. Initial Treatment will focus on:    ADHD Testing:  Patient was given self and collaborative rating scales to be completed prior to the next appointment.  Depression and anxiety rating scales were completed.  Copies of (none) were requested. .     4. Resources/Service Plan:    services are not indicated.   Modifications to assist communication are not indicated.   Additional disability accommodations are not indicated.      5. Collaboration:   Collaboration / coordination of treatment will be initiated with the following  support professionals: None at this time.      6.  Referrals:   The following referral(s) will be initiated: None at this time.     Next Scheduled Appointment: July 12, 2023.      A Release of Information has been obtained for the following: None at this time.     Emergency Contact was confirmed.      Clinical Substantiation/medical necessity for the above recommendations:  N/A.    7. MACARIO:    MACARIO:  Discussed the general effects of drugs and alcohol on health and well-being. Provider gave patient printed information about the effects of chemical use on their health and well being. Recommendations:  Continue with ADHD assessment.     8. Records:   These were reviewed at time of assessment.   Information in this assessment was obtained from the medical record and provided by patient who is a good historian.      Patient will have open access to their mental health medical record.    9.   Interactive Complexity: No      Provider Name/ Credentials:  Sarath Ryder PsyD, DARREN  June 27, 2023

## 2023-07-03 NOTE — PROGRESS NOTES
[facility]    Provider Name:  Sarath Ryder PsyD     Credentials:  LP      ADHD Assessment Results, Summary, and Recommendations      PATIENT'S NAME: Serjio Farris    MRN: [id number]    ACCT. NUMBER:  [id number]    DATE OF SERVICE: 7/12/23    SERVICE MODALITY:  Video Visit:      Provider verified identity through the following two step process.  Patient provided:  Patient photo    Telemedicine Visit: The patient's condition can be safely assessed and treated via synchronous audio and visual telemedicine encounter.      Reason for Telemedicine Visit: Services only offered telehealth    Originating Site (Patient Location): Patient's home    Distant Site (Provider Location): Provider Remote Setting- Home Office    Consent:  The patient/guardian has verbally consented to: the potential risks and benefits of telemedicine (video visit) versus in person care; bill my insurance or make self-payment for services provided; and responsibility for payment of non-covered services.     Patient would like the video invitation sent by:  Send to e-mail at: [email or web address]    Mode of Communication:  Video Conference via [facility]    Distant Location (Provider):  Off-site    As the provider I attest to compliance with applicable laws and regulations related to telemedicine.     Date(s) of assessment:  Diagnostic Assessment 5/22/23 & 6/27/23, Ronny self-report and collateral measures scored and interpreted 7/2/23 and MMPI 7/9/23      Information about appointment:  Patient attended three sessions to aid in determining patient's mental health diagnosis or diagnoses and treatment recommendations that best address patient concerns. Patient records includingmedical were reviewed. A diagnostic assessment was conducted at the initial appointment. Patient completed several rating scales to assist in assessing attention-related and other mental health symptoms that may be causing impairments in functioning. Rating  "scales were also completed by a collateral contact.    Assessment tools:   Some measures may have been completed remotely due to virtual care, in which case observation of task completion was not possible.    Ronny Adult ADHD Rating Scale-IV: Self and Other Reports (BAARS-IV), Ronny Functional Impairment Scale: Self and Other Reports (BFIS), Ronny Deficits in Executive Functioning Scale: Self and Other Reports (BDEFS), Patient Health Questionnaire-9 (PHQ-9), Generalized Anxiety Disorder-7 (WILMAR-7), [place] Multiphasic Personality Inventory (MMPI) and CNS Vital Signs Neurocognitive Battery  [email or web address]     Assessment Results:    Ronny Adult ADHD Rating Scale-IV: Self and Other Reports (BAARS-IV)  The BAARS-IV assesses for symptoms of ADHD that are experienced in one's daily life. This assessment measure includes self and collateral rating scales designed to provide information regarding current and childhood symptoms of ADHD including inattention, hyperactivity, and impulsivity. Self-report scores are reported as percentiles. Scores at the 76th-83rd percentile are considered marginal, scores at the 84th-92nd percentile are considered borderline, scores at the 93rd-95th percentile are considered mild, scores at the 96th-98th percentile are considered moderate, and those at the 99th percentile are considered severe. Collateral or \"other\" rating scales are reported as number of symptoms observed in comparison to those reported by the patient. Norms and percentile scores are not available for collateral reports.     Current Symptoms Scale--Self Report:   Patient completed the self-report inventory of current symptoms. The results indicate that the patient's Total ADHD Score was 54 which places him in the 99th+ percentile for overall ADHD symptoms. In addition, the patient endorsed 9/9 (99th+ percentile) Inattention symptoms, 2/5 (91st percentile) Hyperactivity symptoms, 3/4 (95th " percentile) Impulsivity symptoms, and 6/9 (95th percentile) Sluggish Cognitive Tempo symptoms. Patient indicated that the reported symptoms have resulted in impaired functioning in school, home, work and social relationships. Overall, the results suggest the patient is expeiencing Severe ADHD symptoms.    Current Symptoms Scale--Other Report:  Patient's wife completed the collateral report inventory of current symptoms. Based on the collateral contact's observation of symptoms, the patient demonstrates 9/9 Inattention symptoms, 4/5 Hyperactivity symptoms, 0/4 Impulsivity symptoms, and 6/9 Sluggish Cognitive Tempo symptoms. The patient's Total ADHD Score was 53. The collateral contact indicated the patient demonstrates impaired functioning in school and home} The collateral- and self-report scores are not significantly different.    Childhood Symptoms Scale--Self-Report:  Patient completed the self-report inventory of childhood symptoms. The results indicate that the patient's Total ADHD Score was 62 which places him in the 99th+ percentile for overall ADHD symptoms in childhood. In addition, the patient endorsed 9/9 (99th+ percentile) Inattention symptoms and 7/9 (97th percentile) Hyperactivity-Impulsivity symptoms. Patient indicated that the reported symptoms resulted in impaired functioning in school, home and social relationships Overall, the results suggest the patient experienced  Severe symptoms of ADHD as a child.     Childhood Symptoms Scale--Other Report:  Patient's mother completed the collateral report inventory of childhood symptoms. Based on the collateral contact's recollection of patient's childhood symptoms, the patient demonstrated 8/9 Inattention symptoms and 5/9 Hyperactivity-Impulsivity symptoms. The patient's Total ADHD Score was 54. The collateral contact indicated the patient demonstrates impaired functioning in school and homeThe collateral- and self-report scores are not significantly  "different.                          Ronny Functional Impairment Scale: Self and Other Reports (BFIS)  The BFIS is used to assess the level of impairment in major life/daily activities that may be due to mental health symptoms. This assessment measure includes self and collateral rating scales. Self-report scores are reported as percentiles. Scores at the 76th-83rd percentile are considered marginal, scores at the 84th-92nd percentile are considered borderline, scores at the 93rd-95th percentile are considered mild, scores at the 96th-98th percentile are considered moderate, and those at the 99th percentile are considered severe.Collateral or \"other\" rating scales are reported as number of symptoms observed in comparison to those reported by the patient. Norms and percentile scores are not available for collateral reports.     Results indicate the patient identified impairment (scores at or greater than 93rd percentile) in the following areas: home-family, home-chores, work, social-strangers, social-friends, community activities, education, marriage/cohabiting/dating, money management, self-care routines, health maintenance and childrearing The patient's Mean Impairment Score was 6.8 (99th+ percentile) indicating the patient is reporting Severe impairment in functioning across domains. Patient's wife completed the collateral rating scale, which indicated discrepant results. The collateral contact's scores were generally lower than the patient's report.     Ronny Deficits in Executive Functioning Scale (BDEFS)  The BDEFS is a measure used for evaluating adult executive functioning in daily activities.This assessment measure includes self and collateral rating scales. Self-report scores are reported as percentiles. Scores at the 76th-83rd percentile are considered marginal, scores at the 84th-92nd percentile are considered borderline, scores at the 93rd-95th percentile are considered mild, scores at the 96th-98th " "percentile are considered moderate, and those at the 99th percentile are considered severe.Collateral or \"other\" rating scales are reported as number of symptoms observed in comparison to those reported by the patient. Norms and percentile scores are not available for collateral reports.     Results indicate the patient's Total Executive Functioning Score was 239 (95th percentile). The ADHD-Executive Functioning Index score was 30 (97th percentile). These scores suggest the patient has Mild deficits in executive functioning. These deficits are likely to be due to ADHD. Results indicate the patient identified significant deficits in the following areas: self-management to time 97th percentile , self-organization/problem-solving 96th percentile,  self-restraint 93rd percentile and self-motivation 94th percentile. Patient's wife completed the collateral rating scale, which indicated similar results. The collateral contact's scores were generally higher than the patient's report.    CNS Vital Signs:  Reviewed results of CNS Vital Signs (CNS VS) testing, a neurocognitive test, revealed an above average Neurocognition Index - a form of a global score of overall neurocognitive functioning.  Relative strengths of the client's, based on \"Above Average\" scores, were in the areas of: Psychomotor Speed measures how well a subject perceives, attends, responds, to visual-perceptual information, and performs motor speed and fine motor coordination; and Motor Speed measures one's ability to perform movements to produce and satisfy an intention towards a manual action and goal.     There were areas of relative weakness, based on below average scores:  Verbal Memory which measure how well a person can recognize, remember, and retrieve words.;  Reaction Time which measures how quickly a person can react to a simple and increasingly complex direction set; Complex Attention which measure how well a person can track and respond to a " variety of stimuli over lengthy periods of time and/or perform mental tasks requiring vigilance quickly and accurately; Cognitive Flexibility which measures how well a person is able to adapt to rapidly changing and increasingly complex set of directions and/or to manipulate the information; Processing Speed which measures how well a person recognizes and processes information; Executive Functioning measures how well someone recognizes rules, categories, and manages or navigates rapid decision making; Working memory measures how well a person can perceive and attend to symbols using short-term memory processes; Sustained Attention measures how well a person can direct and focus cognitive activity on specific stimuli; Simple Attention which is a measure of a person's ability to track and respond to a single defined stimulus over lengthy periods of time while performing vigilance and response inhibition quickly and accurately; and Motor Speed measures one's ability to perform movements to produce and satisfy an intention towards a manual action and goal.      [place] Multiphasic Personality Inventory-2 (MMPI-2):  8-7-(1)  Others with similar Validity Scale profiles answered the test questions in a consistent manner.  Some scores suggest distress and indicate unusual or markedly unconventional thinking and attitudes, as well as psychological distress.  These people are likely to be described as acevedo, changeable, restless, unstable, dissatisfied, talkative, and opinionated, but self-deprecating.  Psychopathology is long-standing and may be ego-syntonic in nature.   The profile suggest some exaggeration (of symptoms) and a tendency to present oneself from the perspective of feeling one's worst.  Some scores suggest concern and some rigidity over matters of self-control and moral values, as well as, a tendency to be conforming and conventional.  Such preoccupation with high moral standards usually is attached to a fear  of being found unacceptable by others, unless the person scrupulously adheres to rules.  The defense mechanism  Of denial may be characteristic of these individuals.  This score could also suggest an attempt to look one's best and to deny unacceptable human impulses. These people exhibit culturally appropriate emotional control and manifests a balance between emotional spontaneity and emotional constraint.  They generally feel in control of their emotional life.  Although there are indications of a slight exaggeration of symptoms, this MMPI-2 profile likely is a valid reflection of the individual's stable personality and will be interpreted.    This Clinical Scale profile is frequent among individuals in acute psychological turmoil who are seeking psychological help.  This code pattern is divided between psychotic and neurotic diagnoses.  These individuals are filled with worry, anxiety, tension, and excessive rumination.  Their excessive introspection and overideation may appear confused as well as highly emotional.  They are unable to think or concentrate efficiently, and their distress is so great that insomnia. Some are preoccupied with health problems and report multiple somatic complaints.  Others show notable impairment of functioning, but without psychotic behaviors.  Lacking in confidence, they are shy and feel inadequate and interpersonally sensitive, particularly where heterosexual relationships are concerned.  Poor sexual performance often occurs because of excessive anxiety.  Typically conscientious, their fears and excessive worry often lead them to have only mediocre or poor achievement.  They are extremely anxious and may show depersonalization and isolation from affect.  They have a profoundly negative self-image.  They are constantly on edge as if anticipating a disaster, and are extremely quick to feel criticized, judged, or somehow defective to others.  There is a deep sense of being  fundamentally damaged, and these persons appear to be constantly apprehensive that somehow they are going to be discovered and humiliated because of their perceived defects.    Others with similar supplementary scale scores These people report a variety of physical symptoms, including poor sleep and appetite, sweating and lack of energy, along with the mental difficulties of poor concentration, memory, comprehension, judgment, and work efficiency.  Scores suggest a lack of energy resulting in carrying out routine activities, reduced levels of interest and sociability, avoidance of confrontation, and nervousness.  Some scores indicate one who complains of poor concentration and sleep, restlessness, weakness, poor appetite, a reduced work efficiency, and tiring easily.  Some subscale scores indicate someone who complains of vague physical symptoms with multisite involvement, including headaches, jumping and twitching muscles, fainting and dizziness, and chest pains.  These people feel unhappy, hopeless, that they have lived the wrong kind of life, regret past behaviors, have problems with concentration, and report strange and peculiar experiences.  They are quite remorseful but blame others for their problems.  These individuals feel unloved and misunderstood, they avoid social relationships, and believe others are against them.  They feel they got a raw deal out of life and are punished without cause.  Other scores suggest depression, despair, and apathy.  They report life is a strain.  They report a strange thought process with strange and peculiar experiences.  Some scores reflect a fear of losing control over one's emotions, restlessness, irritability, and periods of dissociation.  They become easily excited, are touchy, would like to do something shocking, and fear people will hurt them.  These individuals may have crying spells.  They report unusual physical sensations (blank spells, twitching and jumping  muscles, peculiar odors, everything tasting the same, hear strange things at night when alone), and thoughts.  They report shyness and anxiety in social situations, feel uneasy around others, deny sociability, do not speak unless spoken to, get easily embarrassed, and do not make friends easily.  They have low self-esteem.  These people report an active effort to avoid social situations and escape from social events.  They report being unfriendly; dislike crowds, parties, dances, and social gatherings; are socially withdrawn; and avoid social groups. These individuals feel alienated from others, are apprehensive, mistrust people, and have low self-esteem and self-image.  They feel disappointed by others, give up easily when things go wrong, and shrink from problems or difficulties.  These individuals admit to a great deal of anger and irritability, but they also experience a sense of distress and perplexity about their reactions that may in some degree support the inhibition of behavioral outbursts.  They are more dysphoric than hostile and are more likely to express anger passive-aggressively (argumentativeness, disagreeableness, annoyance, frustration, stalling, pettiness, impatience, criticism).  Other scores indicated all negative self-attributions that convey a conviction that one is inferior and inadequate.  Some scales reflect passivity, a servile obedience to others, and by implication, an avoidance of responsibility.      Generalized Anxiety Disorder Questionnaire (WILMAR-7)  This self-report questionnaire is designed to assess for anxiety in adults. Patient s score of 4 indicates that he is experiencing minimal symptoms of anxiety.      Patient Health Questionnaire- 9 (PHQ-9)   This self-report questionnaire is designed to assess for depression in adults. Patient s score of 6 indicates that he is experiencing mild symptoms of depression.    Collateral Information   [facility] Questionnaires for  "Other-reporters.    Summary (based on clinical interview, review of records, test results):  Based on the evidence, Attention-Deficit/Hyperactivity Disorder (ADHD) will be diagnosed, as well as, Generalized Anxiety Disorder, and Unspecified Depressive Disorder.  Results of the Ronny Questionnaires were indicative of someone with ADHD.  Both the self- and collateral-reports were clinically significant (93rd percentile rank or higher) for symptoms of ADHD for current and childhood functioning. In addition, symptoms for Inattention, hyperactivity, and impulsivity were all clinically significant for ADHD by all (self,  Wife, and mother) reporters for current and childhood functioning.  These results were consistent with the the client's statements during the interview portion of the assessment.  The client made statements, such as:  \"I get distracted quite easily.  I've always been like that as a kid.  I didn't do that well because of that (inattention).\" Regarding tasks, the client stated, \"Or finishing a task, I'll do a full [place] to get something done.  I'll go to the garage to do something and come out with something else.  I can never sit still.\"  Also very indicative of someone with ADHD, the client's self- and collateral reports were clinically significant for every measure of Executive Functioning with the exception of Self-regulation of Emotion.  This is bolstered by the client's self-report of anxiety and depression being in the minimal to mild range, respectively, as he reports they are well managed by medications currently.     The results of the CNS Vital Signs, a neurocognitive test that evaluates the neurocognitive status of people, also demonstrates the client's struggles with executive functioning.  Overall, the client's score for overall executive functioning fell within the bottom 6% of those that normed the CNS VS.  In addition, of the two other domains that are most sensitive to ADHD, the was in " "the bottom 1% of those that normed the test (Simple Attention and Complex Attention).  The other two Domains that are most sensitive to ADHD are also most sensitive to depression (Cognitive Flexibility and Processing Speed).  Slow Processing Speed is widely known to be tied to ADHD via research.  Of note, the client's Verbal Memory (a measure how well a person can recognize, remember, and retrieve words).  Part of the verbal memory, as well as Visual memory, are the first tests client's take with the CNS VS.  To help test for recall, these two tests are also the last tests administered.  The client acknowledged \"messing up\" on the second time taking the Verbal Memory test helping explain why he recall zero words on the recall portion of this test.  This also explains why his score was so low.  Because of this, it is undetermined if the client does have problems with verbal memory.  The rest of the CNS VS profile is indicative of someone with ADHD, as well as, likely depression.    The MMPI-2 profile is frequent among individuals in acute psychological turmoil who are seeking psychological help.  This code pattern is divided between psychotic and neurotic diagnoses.  Others, such as this client, show notable impairment of functioning, but without psychotic behaviors. 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   Also prevalent with this profile is someone who is socially shy and complains of poor concentration and sleep, restlessness, weakness, poor appetite, a reduced work efficiency, and tiring easily.       Also part of the battery used for this ADHD Assessment was the [place] Sleepiness Scale (ESS) SF-8:  The ESS measures a person s general level of daytime sleepiness, or their average sleep propensity in daily life (ASP).  For the purposes of this questionnaire, " "Sleepiness (or sleep propensity) is defined as: The probability, ease or speed of making the transition from alert wakefulness, through drowsiness, to sleep under a given set of circumstances*.  ESS scores of 11-24 represent increasing levels of  excessive daytime sleepiness  (EDS).  The client obtained and ESS score of 3 which is in the Lower Normal Daytime Sleepiness range.     * As defined on the \"official website of the ESS on 3/11/23: https://Wizpert/sleepiness/.     In summary, test results provide evidence to support a diagnosis of ADHD, combined type. Symptoms of inattention, hyperactivity, and impulsivity are present across domains, along with deficits in processing speed and sustaining attention.  Patterns were noted indicating a slowed cognitive impairment and hyperactivity/impulsivity. Client is experiencing associated impairments in daily functioning.     We discussed the possibility that Client may experience some improvement in anxiety symptoms with treatment of attention problems.  Client will consult with his PCP about medication options.       Diagnosis:   Diagnostic Criteria:   Generalized Anxiety Disorder  A. Excessive anxiety and worry about a number of events or activities (such as work or school performance).   B. The person finds it difficult to control the worry.  C. Select 3 or more symptoms (required for diagnosis). Only one item is required in children.   - Restlessness or feeling keyed up or on edge.    - Being easily fatigued.    - Difficulty concentrating or mind going blank.    - Irritability.    - Sleep disturbance (difficulty falling or staying asleep, or restless unsatisfying sleep).   D. The focus of the anxiety and worry is not confined to features of an Axis I disorder.  E. The anxiety, worry, or physical symptoms cause clinically significant distress or impairment in social, occupational, or other important areas of functioning.   F. The disturbance is not " "due to the direct physiological effects of a substance (e.g., a drug of abuse, a medication) or a general medical condition (e.g., hyperthyroidism) and does not occur exclusively during a Mood Disorder, a Psychotic Disorder, or a Pervasive Developmental Disorder.    - The aformentioned symptoms began 15 year(s) ago and occurs 2 days per week and is experienced as moderate. Unspecified Depressive Disorder Attention Deficit Hyperactivity Disorder  A) A persistent pattern of inattention and/or hyperactivity-impulsivity that interferes with functioning or development, as characterized by (1) Inattention and/or (2) Hyperactivity and Impulsivity  (1) Inattention: 6 or more of the following symptoms have persisted for at least 6 months to a degree that is inconsistent with developmental level and that negatively impacts directly on social and academic/occupational activities:  - Often fails to give close attention to details or makes careless mistakes in schoolwork, at work, or during other activities  - Often has difficulty sustaining attention in tasks or play activities  - Often has difficulty organizing tasks and activities  - Often avoids, dislikes, or is reluctant to engage in tasks that require sustained mental effort  - Is often easily distractedby extraneous stimuli  - Is often forgetful in daily activities  (2) Hyeractivity and Impulsivity: 6 or more of the following symptoms have persisted for at least 6 months to a degree that is inconsistent with developmental level and that negatively impacts directly on social and academic/occupational activities:  - Often fidgets with or taps hands or feet or squirms in seat  - Is often \"on the go,\" acting as if \"driven by a motor\"  - Often has difficulty waiting his or her turn  B) Several inattentive or hyperactive-impulsive symptoms were present prior to age 12 years  C) Several inattentive or hyperactive-impulsive symptoms are present in two or more settings  D) There " is clear evidence that the symptoms interfere with, or reduce the quality of, social academic, or occupational functioning      Recommendations:    Schedule an appointment with your physician to discuss a medication evaluation., Access resources through websites, books, and articles such as those provided  in the handout., Consider working with an ADHD  or individual therapist to learn skills to  assist with symptom management, as well as ways to improve relationships,  etc that may have been impacted by your symptoms. , Consider attending workshops or support groups through "[employer] ([place])., Schedule a follow-up appointment with me in about six weeks to review symptoms, treatment involvement, and struggles and/or successes. and and the client can also refer to the skills listed below to help manage some symptoms of ADHD, as well as, refer to the self-help websites listed below to learn more about ADHD and additional skills to try.  The client is recommended to continue to meet regularly with his prescribing professional to review medication effectiveness.     Online Self-help Websites for ADHD Symptoms:  [email or web address]  [employer]org  [employer].org  [email or web address]        Processing Speed:    Keep things the same: Establish clear routines and schedule to help increase speed of doing things.  The more automatic or routine something is, the more efficient you can be.    Slow down talk:  Be aware, and make others aware, not to provide too much information too fast.  Take one topic at a time.    Be aware of the time:  Be aware of the time to help with time management and not taking too long on any one task.    Use both visual and verbal cues:  Use both verbal and visual cues to help process information faster (write things down to help process).        Time Management:    Reduce distractions by turning off the phone and email alerts and move to a quieter environment.    Use daily  goal-setting to keep priorities at the front of the mind.    Limit the number of balls in the air at one time.  Learn to not start something before finishing something else.    Use a reminder (beeper, alarm, vibrating alarm) to remind you to consider if what you are currently doing is what you should be doing.    Use social pressure positively.  Make a commitment to someone else to get a certain number of things done in a given time period.    Work with someone else present - even if they are not directly involved in the process (their presence can be a constant reminder to stay on task).        Addressing Inattention:    Reduce extraneous stimuli:  Work to reduce clutter, noise, visual stimuli, or reminders of tasks that shouldn't be engaged in.    Amplify relevant stimuli:  For example, putting up a whiteboard in an easy-to-see place and writing reminder notes on it; not allowing what's important to do to fade into the background.  Decrease the strength of undesirable stimuli and increasing the strength of the desired ones.    Increase external aids (to help with maintaining a higher level of functioning):  Use of supplemental aids to help bolster areas of weakness:  ? Calendars (wall or desk calendar can help)  ? Planners  ? Clocks, Alarms, and Reminders  ? Use of Digital Voice Recorders (even on Smart phone)        Procrastination/Avoidance:  Tasks are typically avoided because they are either too boring in inspire the person to work on it or there is something about the task that arouses anxiety.  The anxiety can be addressed in therapy to work on the underlying thoughts and feelings that arouse the anxiety.  Other skills are:    Keep in the mind the potentially high price for procrastinating.    If the problem is based on not knowing how to do something, then focus on learning the skills required.    Learn to break large projects into small pieces so it is not so overwhelming.  Give yourself a small reward for  "completing the smaller pieces in time.    Make a commitment to someone else to complete the task.    Intersperse boring and enjoyable activities.    If working on a perceived long, boring tasks, commit to working on it for 10 minutes and see how it goes.  Reassess after 10 minutes.    Severe procrastination may be a signal that the client should be doing something else, having someone else do that particular task or finding a new way to do it.     Distractibility:    Use reminders to keep on task.    Work to get into the habit of frequently asking yourself, \"What should I be doing now?\"  Cue yourself with the use of alarms.    Work in a quiet and less visually stimulating environment or use a sound machine or fan to provide white noise to screen out other sounds.  Noise canceling headphones would also work.    Try to keep an orderly work space or, at a minimum, clear some of the mess to one side to begin and schedule a later time to deal with the organizing.    When you find yourself off tangent, to back to the original task and finish that before moving on to the next.    If you have times of hyperfocusing, use it to your advantage by immersing yourself into a project and completing at least a big chunk of it.    Break work sessions into smaller segments with short breaks in between to reduce wandering attention or to prevent crashing and abandoning the work altogether.  Make sure to set an alarm to limit the break and getting back to work.    Find the right balance between enough stimulus in your environment to not get bored but not so much stimuli that you feel overwhelmed.    Set aside specific interruption free periods in your day for tasks that require extra focus (turn off phone, email alerts, closing open browsers, etc...).     Running Late:    Learn to create a schedule ahead of time for what needs to get done and when.    Avoid best-case scenario planning and build time for unexpected events, transitions, " "and breaks.  Then pad the plan with a little more time.    Clients can time how long different tasks take and write down the information.  Refer back to this when planning and scheduling activities.    Set an alarm for when you need to stop and/or start a task or getting ready.    Make a point to getting to bed on time and then getting out of bed on time so you don't start the day late.  Keep mornings simple by doing as much as you can the night before.    Learn to avoid distractions (like television, radio, going online).    Avoid starting an engrossing activity too close to a transition time.    Build slack time into the schedule to absorb unexpected overflow from other various activities.    Plan to be early to appointments or meetings.  Bring something to work on or read if you have extra time to avoid boredom (but not too engrossing).    Do not start tasks that will \"take only a minute\" because tasks don't take \"only a minute.\"      Sarath Ryder PsyD    Psychological Testing  Sample Billing/Services Summary       Testing Evaluation Services Base: 00958  (1st 60 mins) Add-on: 27696  (each addtl 60 mins)   Record Review and Clarify Referral Question   (Start/Stop), (Date, Day #) 0 minutes   Intra-Session Clinical Decision Making   (Start/Stop), (Date, Day #) 0 minutes   Patient Symptom Management   (Start/Stop), (Date, Day #) 0 minutes   Clinical Decision Making/Battery Modification   (Start/Stop), (Date, Day #) 0 minutes   Integration/Report Generation   (7:30pm/8:29pm), (7/2/23, Day #1); (8:27pm/9:12pm), (7/9/23, Day #2); (2:26pm/3:07pm), (7/16/23, Day #4) 145 minutes   Interactive Feedback Session  (6:00pm/7:02pm), (7/12/23, Day #3) 62 minutes   Post-Service Work   (Start/Stop), (Date, Day #) 0 minutes   Total Time: 207 minutes (3 hours, 27 minutes)   Total Units: 1 2       Test Administration and Scoring Base: 08375  (1st 30 mins) Add-on: [id number]  (each addtl 30 mins)   Test Administration " (Face-to-Face)  (Start/Stop); (Start/Stop), (Date, Day #) N/A minutes   Scoring (Non-Face-to-Face)   (Start/Stop), (Date, Day #) N/A minutes   Total Time: N/A minutes (N/A hours, N/A minutes)   Total Units: N/A N/A       Diagnosis(es): (ICD-10)  314.01  (F90.2)  Attention-Deficit/Hyperactivity Disorder, Combined presentation, Moderate  300.02  (F41.1)  Generalized Anxiety Disorder  311    (F32.9)     Unspecified Depressive Disorder

## 2023-07-12 ENCOUNTER — VIRTUAL VISIT (OUTPATIENT)
Dept: PSYCHOLOGY | Facility: CLINIC | Age: 43
End: 2023-07-12
Payer: COMMERCIAL

## 2023-07-12 DIAGNOSIS — F90.2 ATTENTION DEFICIT HYPERACTIVITY DISORDER, COMBINED TYPE: Primary | ICD-10-CM

## 2023-07-12 DIAGNOSIS — F41.1 GENERALIZED ANXIETY DISORDER: ICD-10-CM

## 2023-07-12 DIAGNOSIS — F32.9: ICD-10-CM

## 2023-07-12 PROCEDURE — 96131 PSYCL TST EVAL PHYS/QHP EA: CPT | Mod: VID | Performed by: PSYCHOLOGIST

## 2023-07-12 PROCEDURE — 96130 PSYCL TST EVAL PHYS/QHP 1ST: CPT | Mod: VID | Performed by: PSYCHOLOGIST

## 2023-07-12 PROCEDURE — 99207 PR NO CHARGE LOS: CPT | Mod: VID | Performed by: PSYCHOLOGIST

## 2023-07-14 ENCOUNTER — VIRTUAL VISIT (OUTPATIENT)
Dept: RHEUMATOLOGY | Facility: CLINIC | Age: 43
End: 2023-07-14
Attending: INTERNAL MEDICINE
Payer: COMMERCIAL

## 2023-07-14 VITALS — WEIGHT: 195 LBS | BODY MASS INDEX: 23.75 KG/M2 | HEIGHT: 76 IN

## 2023-07-14 DIAGNOSIS — M35.2 BEHCET'S DISEASE (H): Primary | ICD-10-CM

## 2023-07-14 DIAGNOSIS — Z79.899 HIGH RISK MEDICATION USE: ICD-10-CM

## 2023-07-14 PROCEDURE — 99214 OFFICE O/P EST MOD 30 MIN: CPT | Mod: VID | Performed by: INTERNAL MEDICINE

## 2023-07-14 RX ORDER — PREDNISONE 10 MG/1
TABLET ORAL
Qty: 45 TABLET | Refills: 0 | Status: SHIPPED | OUTPATIENT
Start: 2023-07-14 | End: 2023-08-10

## 2023-07-14 ASSESSMENT — PAIN SCALES - GENERAL: PAINLEVEL: NO PAIN (0)

## 2023-07-14 NOTE — PROGRESS NOTES
Virtual Visit Details    Type of service:  Video Visit     Joined the call at 7/14/2023, 2:49:46 pm.  Left the call at 7/14/2023, 3:13:05 pm.  You were on the call for 23 minutes 18 seconds .    Originating Location (pt. Location): home    Distant Location (provider location):  off site  Platform used for Video Visit: [facility] Outpatient Rheumatology Follow-up  Date of service: 7/14/23  Date of last visit: 4/14/23  Patient name: Serjio Farris  YOB: 1980  MRN: [id number]    Reason for follow-up: Behcets    HPI:  July 14, 2023  -last we talked cut down to 1 tab of otezla. Some improved, but then by 4 weeks severe GI upset so stopped this  -prednisone x2 courses required since last visit due to severe flares  -still on colchicine 0.6mg BID  -severe flares have included oral ulcers/GI bleeding  -no fevers/chills/night sweats  -no rashes  -his oral uclers last for 6-7 days, they are the longest lasting symptoms  -purpose of today's visit is to discuss steroid sparing therapy to replace otezla  -no rash  -no joint sypmptoms  -no progressive unintentional weight loss  14 point ROS collected and negative if not documented above    4/14/23  -2-3 clusters of sores develop much less severe. Resolve over a period of days. Same with GI symptoms. Also in nose. Over 4 days will improve and resolve  -No significant, if any, blood in the stool  -Joints about the same. Has been seeing chiropractor. Hands still achy.   -Has not used steroids because his flares have not been severe  -Has continued on colchicine 0.6mg BID.   -Has been having reflux/GERD symptoms which have been more pronounced recently.   -Has continued on otezla 30mg BID, however has been having reflux/GERD symptoms and GI upset which has been an issue in the past, however much more severe over the last month or so  -No new/progressive fevers/chills/night sweats  -no unintentional weight loss  -no interval infections  -no new  "rashes  14 point ROS collected and negative if not documented above.     Interval history 1/13/23  -middle of dec had very mild flare of 1-2 oral lesions  -at same time mild GI symptoms consistent with prior flares  -otherwise his disease has been more quiet/no other new/evolving symptoms  -has been on colchicine BID and the above flare was more mild in re: to severity and resolved more quickly  -last dose of prednisone was last week for his recent flare. He was started on prednisone taper for his flare (starting at 50mg daily and decreasing by 10mg every 5 days). Tolerated prednisone without noticeable side effects.   -No GI upset/diarrhea with colchicine  -No interval infections/new or progressive rash/fevers/chills/night sweats  -No interval progressive occular symptoms.   14 point ROS collected and otherwise negative.      Interval history:  10/21/22  -2-3 GI flares (took naproxen which resolves them in 2-3 days). Reports that all episodes were associated with blood in his stool  -1 oral flare of ulcers.  -No genital ulcers/eye symptoms since his last visit  -Feels that his flares (specific oral ulcers) have been milder while being on steroids. Currently on 17.5mg of prednisone. Has noticed increased appetite. Initially some trouble sleeping in the first 1-2 weeks, then no issues. Has developed upper acne on back/neck/chest/arms with steroids. No other rashes  -No interval infections.     14 point review of systems collected and negative if not documented above.    HPI from initial consultation  At age 22 would develop 5-6 ulcers back of throat last for 10 days or so. 1-2 times per month. Severely painful. Would try mouthwashes/losanges. Nothing was much helpful. Difficult to eat during these periods. These have continued. Then at age [age], woke up with droopy face on the left. Was told he had a \"mini stroke\"/ TIA. No stroke found on MRI. Symptoms resolved after about 24 hours.     Once he met his wife, has had " numerous stomach issues. Has had multiple EGDs/Colonoscopies. Multiple bloody stools. Has had this ongoing since 2009/2010. Was on mezavant XL. Would be weak and fatigued during these episodes. Flares would last for days. [place] the [place] in [place] this therapy was stopped. Did food allergy testing. Started on FODMAP diet. He still has GI flares about once per month. Lasting for about 5 days or so. Stool can have bright red blood or be black/tarry.    In the last year has had 2 episodes of genital ulcers. 8-12 oral ulcers. He has found that the flares of his oral ulcers and GI flares with blood occur at the same time, though the genital ulcers are independent. He also has developed nasal ulcers which correspond to his oral/GI ulcers. Has had thorough infectious work-up with his recurrent oral/nasal/genital ulcers all of which has been unrevealing.     3 days prior to going to the ED last month and what ultimately prompted referral to rheumatology today, he reports he had a typical evening. Went to bed. Woke up around midnight with epigastric pain. Severe/unbearable. Was brought to the ED and eval done at that time included CT abdomen/ pelvis with contrast which showed minor soft tissue density and fat stranding around the origin of the celial artery, right hepatic artery, and to a lesser extent the superior mesenteric artery. He was started on prednisone 80mg once daily with a taper over 2 months. He is currently on 40mg once daily today. His abdominal symptoms have resolved. Since starting prednisone he has not had interval oral/genital/GI bleeding.     No history of occular inflammation. Does have back pain/CMC/greater MTP pain.     Has pain in the bottom of his back, bilateral lumbar spine. Worst in the AM before he gets up out of bed. He has alternating between left/right at times. It has woken him up in the second half of the night.     No symptoms consistent with dactylitis.     Has a history of  bradycardia. No history of DVT/PE. No symptoms consistent with raynauds.     Over the last few months he has noticed that he has been diaphoretic. He has a normal temperature. During flares he does have fatigue/chills.     Has had aching/shaking of his left hand/arm. Ongoing. No wrist drop/foot drop     14 point ROS collected and negative if not documented above.     Past Medical History:  Past Medical History:   Diagnosis Date    Depressive disorder 2012/2013    Due to my stomach issues and not being sorted. OK now    Stroke (H) age 25 approx    possible     Past Surgical History:  Past Surgical History:   Procedure Laterality Date    COLONOSCOPY N/A 04/27/2015    Procedure: COLONOSCOPY;  Surgeon: Pako Iqbal MD;  Location: [facility] OR    COLONOSCOPY WITH CO2 INSUFFLATION N/A 04/27/2015    Procedure: COLONOSCOPY WITH CO2 INSUFFLATION;  Surgeon: Pako Iqbal MD;  Location: [facility] OR    ESOPHAGOSCOPY, GASTROSCOPY, DUODENOSCOPY (EGD), COMBINED Left 05/28/2015    Procedure: COMBINED ESOPHAGOSCOPY, GASTROSCOPY, DUODENOSCOPY (EGD), BIOPSY SINGLE OR MULTIPLE;  Surgeon: Pako Iqbal MD;  Location: [facility]    HC BREATH HYDROGEN TEST N/A 09/11/2015    Procedure: HYDROGEN BREATH TEST;  Surgeon: Pako Iqbal MD;  Location: [facility]    HC TOOTH EXTRACTION W/FORCEP      age 15    testicular torsion  2010    UROLOGY PROCEDURE                         DATE:  01/01/2012    Testicular Torsion     Medications:  Current Outpatient Medications   Medication    apremilast (OTEZLA) 30 MG tablet    MITIGARE 0.6 MG capsule    omeprazole (PRILOSEC) 40 MG DR capsule    sertraline (ZOLOFT) 100 MG tablet    traZODone (DESYREL) 50 MG tablet     No current facility-administered medications for this visit.   Currently on 40mg of prednisone daily    Allergies:  Allergies   Allergen Reactions    Ibuprofen GI Disturbance     Family history:  No family history of autoimmune disease, though sister does have oral ulcers.     Social History:  ,  no children  ETOH: rare  Smoking: Quit smoking in 2014  Drug use: No IVDU  Occupation: Works for [employer]    Objective:  No vitals or exams for today's video visit  Sitting up unassisted NAD, pleasant and interactive as always  No facial rash  No current oral ulcers  Sclera clear  Breathing comfortably without use of accessory muscles, no audible wheeze, no cough  Fluid movement of bilateral shoulders elbows wrists MCPs PIPs Dips throughout the encounter today  No acute cutaneous lesions appreciated on exposed skin     WBC   Date Value Ref Range Status   02/25/2020 6.2 4.0 - 11.0 10e9/L Final     WBC Count   Date Value Ref Range Status   05/30/2023 6.6 4.0 - 11.0 10e3/uL Final     Hemoglobin   Date Value Ref Range Status   05/30/2023 13.4 13.3 - 17.7 g/dL Final   02/25/2020 14.7 13.3 - 17.7 g/dL Final     Platelet Count   Date Value Ref Range Status   05/30/2023 188 150 - 450 10e3/uL Final   02/25/2020 152 150 - 450 10e9/L Final     Creatinine   Date Value Ref Range Status   05/30/2023 1.24 (H) 0.67 - 1.17 mg/dL Final   02/25/2020 1.03 0.66 - 1.25 mg/dL Final     Lab Results   Component Value Date    ALKPHOS 66 06/17/2022    ALKPHOS 70 02/25/2020     AST   Date Value Ref Range Status   05/30/2023 23 10 - 50 U/L Final   02/25/2020 14 0 - 45 U/L Final     Lab Results   Component Value Date    ALT 21 06/17/2022    ALT 19 02/25/2020     Sed Rate   Date Value Ref Range Status   04/07/2015 7 0 - 15 mm/h Final     Erythrocyte Sedimentation Rate   Date Value Ref Range Status   05/30/2023 6 0 - 15 mm/hr Final   /  CRP Inflammation   Date Value Ref Range Status   04/07/2015 <2.9 0.0 - 8.0 mg/L Final     UA RESULTS:  No results for input(s): COLOR, APPEARANCE, URINEGLC, URINEBILI, URINEKETONE, SG, UBLD, URINEPH, PROTEIN, UROBILINOGEN, NITRITE, LEUKEST, RBCU, WBCU in the last [id number] hours.   MARVIN Screen by EIA   Date Value Ref Range Status   05/05/2015 <1.0  Interpretation:  Negative   <1.0 Final     DNA (ds) Antibody   Date  Value Ref Range Status   09/14/2022 0.6 <10.0 IU/mL Final     Comment:     Negative     RNP Antibody IgG   Date Value Ref Range Status   09/14/2022 Negative Negative Final     Rheumatoid Factor   Date Value Ref Range Status   09/14/2022 <6 <12 IU/mL Final   05/05/2015 <20 <20 IU/mL Final     Cyclic Citrullinated Peptide Antibody IgG   Date Value Ref Range Status   09/14/2022 <0.4 <7.0 U/mL Final     Comment:     Negative     Myeloperoxidase Antibody IgG   Date Value Ref Range Status   09/14/2022 Negative Negative Final     Proteinase 3 Antibody IgG   Date Value Ref Range Status   09/14/2022 Negative Negative Final     Imaging  8/24/22  CT abdomen and pelvis with contrast at [facility] ED available in care everywhere  FINDINGS:   Lung bases clear     Small cyst left hepatic lobe near the dome.  A few other tiny cystic lesions   too small to characterize though likely also small cysts.     Unremarkable gallbladder     Unremarkable spleen and adrenal glands.     In normal pancreas.     Normal perfusion of kidneys.  Unremarkable bladder     No inflamed or obstructed bowel.  No significant atherosclerotic   calcifications.  Proximal portion of the major midline vessels patent.     Minor ill-defined soft tissue density around the celiac artery, origin of the   superior mesenteric artery and around the proximal segment of the hepatic   arteries.  Mild narrowing at the origin of the celiac artery.     No enlarged lymph nodes.     Para-aortic fat appeared normal without findings of adenopathy or   retroperitoneal fibrosis around the aorta.  Negative for retroperitoneal mass.    Impression  1. Minor soft tissue density and fat stranding around origin of celiac artery,   right hepatic artery and to lesser extent superior mesenteric artery.  Some   component of retroperitoneal fibrosis, vasculitis or less likely   lymphoproliferative malignancy are differentials.  The mild narrowing of celiac   artery with slight inferior  displacement at origin less likely to reflect a   component of median arcuate ligament syndrome.   2. Negative for adenopathy   3. Negative for findings of retroperitoneal fibrosis along aorta margins   4. No significant discrepancy with preliminary   5.  CODE 3:  Report contains unexpected findings requiring further evaluation   or follow-up.    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     COMPARISON: February 25, 2020     TECHNIQUE: Multiplanar multiformatted CTA images were obtained from  the lung apices through the chest, abdomen, and pelvis after the  uneventful administration of Isovue-370 intravenous contrast given for  a total of 80 mL. Radiation dose for this scan was reduced using  automated exposure control, adjustment of the mA and/or kV according  to patient size, or iterative reconstruction technique. Three-D  reformatted images were created at a separate workstation.     FINDINGS: The visible thyroid gland is unremarkable. No abnormally  enlarged mediastinal lymph nodes. Heart size is normal. No acute  osseous abnormality. No pulmonary masses.     The gallbladder is contracted. The liver has a small hypodensity in  the left hepatic lobe, measuring 1 cm in size, present on previous  exam, perhaps minimally increased since that time. The spleen, adrenal  glands, and pancreas are unremarkable. Both kidneys are normally  perfused. No hydronephrosis or nephrolithiasis. No intraperitoneal  fluid or air. The bladder is mostly decompressed. Left hydrocele is  partially visible. No dilated loops of bowel.     The ascending thoracic aorta is 2.6 x 2.7 cm. The aortic arch is 2 cm.  Origins of the great arteries are widely patent. Descending thoracic  aorta is normal in size and appearance. The celiac axis, SMA,  bilateral renal, and inferior mesenteric arteries are patent. The  abdominal aorta is normal in size and appearance. No  "wall thickening  or surrounding inflammatory change. Both common iliac, internal iliac,  external iliac, and common femoral arteries are patent.                                                                      IMPRESSION:  1. Patent thoracoabdominal aorta. No aneurysmal dilatation or  suggestion of inflammation. No stenosis.  2. Visceral arteries are patent and appear unremarkable.  3. Tiny hypodensity in the left hepatic lobe is 1 cm, minimally  increased in size from previous exam.    A/P  42 year old male was referred to rheumatology for evaluation of vasculitis in the setting of a ED evaluation on 8/24/22 for severe abdominal pain during which a CT abdomen/pelvis with contrast was obtained and showed minor soft tissue density and fat stranding around the origin of the celiac artery, right hepatic artery and to a lesser extent superior mesenteric artery.     His history is notable for  1. Recurrent oral ulcerations: 5-12 at one time. Occur at least monthly. Infectious work-up negative. Started around age 22  2. Recurrent nasal ulcerations: occur at the same time as his oral ulcers  3. 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 Has had scarring on his scrotum due to prior lesions  4. Recurrent abdominal pain associated with bloody stool (sometimes bright red sometimes black/tarry): occurs at the same time as his oral/nasal ulcers.  5. Has a history of what sounds like a TIA. Was told it was a \"mini stroke\". This was in the [place] prior to [place]. No records are available to review in our system.     No evidence of cutaneous lesions or occular inflammation by history/exam/review of the record.     Taken together concern for vasculitis and more specifically behcets given his clinical history/findings. Additional work-up to ensure no other process was driving these findings was pursued and included  CBC, CMP, ESR, CRP, C3, C4, cryo, MARVIN, SSA, SSB, Smith, RNP, RF, CCP, ANCA, " MPO, PR3, IgG subclasses, treponema with reflex, Hep B/C/HIV/quant gold, ACE, 1,25 dihydroxyvitamin D all of which was negative/normal. I also obtained a TPMT enzyme which was normal.  He presented to me on prednisone 40mg daily which was started in the ED and taper plan established: 9/7- 9/20 40mg daily then 9/21- 10/4 30mg daily then 10/5-10/18 20 mg daily then 10/19-11/1 17.5 mg daily then 11/2-11/15  15 mg daily then 11/16-11/29 12.5mg daily then  11/30 start 10mg daily then 7.5mg daily x2 weeks then 5mg daily x2 weeks then 2.5mg daily x2 weeks then stop. We obtained repeat imaging on 10/5/22 with CTA chest abdomen pelvis which showed complete resolution of the previously noted inflammatory findings concerning for vasculitis. He was started on colchicine 0.6mg BID and he successfully tapered off of prednisone.  Due to ongoing active symptoms consistent with prior flares which were occurring through colchicine 0.6 mg twice daily he was started on apremilast 30 mg twice daily.  He developed significant GI/GERD symptoms on apremilast and so we tried to decrease the dose of this to once daily which was initially helpful, but his GI upset/side effects returned and so this was the stopped as well. He continues on colchicine 0.6mg BID.     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 If he should continue to have active disease despite addition of TNF inhibitor, will plan to add AZA to his current regimen. Discussed this plan in detail with him today. He is in agreement with the plan. Risks and benefits of TNF inhibitor discussed today to include infection, injection site reactions, anaphylaxis, demyelinating disease, GI/hepatoxicity, bowel perforation, malignancy among others. Patient is agreeable to start.     High risk medication use: Apremilast stopped since last visit. On colchicine and continuing. Starting TNF inhibitor humira, see  discussion above.   - Risk benefits of both medications discussed to include infection, GI/bladder toxicity, bone marrow suppression, rash, GI upset, malignancy among others.  He is agreeable given the benefit he is already achieved and lack of side effects at this time though unable to determine whether the current GI upset is secondary to either the colchicine or apremilast as we try to determine this with the addition of proton pump inhibitor as outlined above.  - He is overdue for routine screening drug toxicity monitoring labs.  Standing orders are in place for CBC and CMP.  We will call him for check out and schedule lab appointment in the next 1-2 weeks.      Will follow-up with me in the next 3-4 months or sooner with new/progressive symptoms. Should he have a significant flare of vasculitis, he has prednisone on hand that he knows to start and let me know if he uses so that I can track along with frequency of flares in between visits and also refill.     Jimmy Vann MD  Rheumatology

## 2023-07-14 NOTE — LETTER
7/14/2023       RE: Serjio Farris  [address]  [facility] [address]     Dear Colleague,    Thank you for referring your patient, Serjio Farris, to the [facility] [place] at [facility]. Please see a copy of my visit note below.    Virtual Visit Details    Type of service:  Video Visit     Joined the call at 7/14/2023, 2:49:46 pm.  Left the call at 7/14/2023, 3:13:05 pm.  You were on the call for 23 minutes 18 seconds .    Originating Location (pt. Location): home    Distant Location (provider location):  off site  Platform used for Video Visit: [facility] Outpatient Rheumatology Follow-up  Date of service: 7/14/23  Date of last visit: 4/14/23  Patient name: Serjio Farris  YOB: 1980  MRN: [id number]    Reason for follow-up: Behcets    HPI:  July 14, 2023  -last we talked cut down to 1 tab of otezla. Some improved, but then by 4 weeks severe GI upset so stopped this  -prednisone x2 courses required since last visit due to severe flares  -still on colchicine 0.6mg BID  -severe flares have included oral ulcers/GI bleeding  -no fevers/chills/night sweats  -no rashes  -his oral uclers last for 6-7 days, they are the longest lasting symptoms  -purpose of today's visit is to discuss steroid sparing therapy to replace otezla  -no rash  -no joint sypmptoms  -no progressive unintentional weight loss  14 point ROS collected and negative if not documented above    4/14/23  -2-3 clusters of sores develop much less severe. Resolve over a period of days. Same with GI symptoms. Also in nose. Over 4 days will improve and resolve  -No significant, if any, blood in the stool  -Joints about the same. Has been seeing chiropractor. Hands still achy.   -Has not used steroids because his flares have not been severe  -Has continued on colchicine 0.6mg BID.   -Has been having reflux/GERD symptoms which have been more  pronounced recently.   -Has continued on otezla 30mg BID, however has been having reflux/GERD symptoms and GI upset which has been an issue in the past, however much more severe over the last month or so  -No new/progressive fevers/chills/night sweats  -no unintentional weight loss  -no interval infections  -no new rashes  14 point ROS collected and negative if not documented above.     Interval history 1/13/23  -middle of dec had very mild flare of 1-2 oral lesions  -at same time mild GI symptoms consistent with prior flares  -otherwise his disease has been more quiet/no other new/evolving symptoms  -has been on colchicine BID and the above flare was more mild in re: to severity and resolved more quickly  -last dose of prednisone was last week for his recent flare. He was started on prednisone taper for his flare (starting at 50mg daily and decreasing by 10mg every 5 days). Tolerated prednisone without noticeable side effects.   -No GI upset/diarrhea with colchicine  -No interval infections/new or progressive rash/fevers/chills/night sweats  -No interval progressive occular symptoms.   14 point ROS collected and otherwise negative.      Interval history:  10/21/22  -2-3 GI flares (took naproxen which resolves them in 2-3 days). Reports that all episodes were associated with blood in his stool  -1 oral flare of ulcers.  -No genital ulcers/eye symptoms since his last visit  -Feels that his flares (specific oral ulcers) have been milder while being on steroids. Currently on 17.5mg of prednisone. Has noticed increased appetite. Initially some trouble sleeping in the first 1-2 weeks, then no issues. Has developed upper acne on back/neck/chest/arms with steroids. No other rashes  -No interval infections.     14 point review of systems collected and negative if not documented above.    HPI from initial consultation  At age 22 would develop 5-6 ulcers back of throat last for 10 days or so. 1-2 times per month. Severely  "painful. Would try mouthwashes/losanges. Nothing was much helpful. Difficult to eat during these periods. These have continued. Then at age [age], woke up with droopy face on the left. Was told he had a \"mini stroke\"/ TIA. No stroke found on MRI. Symptoms resolved after about 24 hours.     Once he met his wife, has had numerous stomach issues. Has had multiple EGDs/Colonoscopies. Multiple bloody stools. Has had this ongoing since 2009/2010. Was on mezavant XL. Would be weak and fatigued during these episodes. Flares would last for days. [place] the [place] in [place] this therapy was stopped. Did food allergy testing. Started on FODMAP diet. He still has GI flares about once per month. Lasting for about 5 days or so. Stool can have bright red blood or be black/tarry.    In the last year has had 2 episodes of genital ulcers. 8-12 oral ulcers. He has found that the flares of his oral ulcers and GI flares with blood occur at the same time, though the genital ulcers are independent. He also has developed nasal ulcers which correspond to his oral/GI ulcers. Has had thorough infectious work-up with his recurrent oral/nasal/genital ulcers all of which has been unrevealing.     3 days prior to going to the ED last month and what ultimately prompted referral to rheumatology today, he reports he had a typical evening. Went to bed. Woke up around midnight with epigastric pain. Severe/unbearable. Was brought to the ED and eval done at that time included CT abdomen/ pelvis with contrast which showed minor soft tissue density and fat stranding around the origin of the celial artery, right hepatic artery, and to a lesser extent the superior mesenteric artery. He was started on prednisone 80mg once daily with a taper over 2 months. He is currently on 40mg once daily today. His abdominal symptoms have resolved. Since starting prednisone he has not had interval oral/genital/GI bleeding.     No history of occular inflammation. Does " have back pain/CMC/greater MTP pain.     Has pain in the bottom of his back, bilateral lumbar spine. Worst in the AM before he gets up out of bed. He has alternating between left/right at times. It has woken him up in the second half of the night.     No symptoms consistent with dactylitis.     Has a history of bradycardia. No history of DVT/PE. No symptoms consistent with raynauds.     Over the last few months he has noticed that he has been diaphoretic. He has a normal temperature. During flares he does have fatigue/chills.     Has had aching/shaking of his left hand/arm. Ongoing. No wrist drop/foot drop     14 point ROS collected and negative if not documented above.     Past Medical History:  Past Medical History:   Diagnosis Date    Depressive disorder 2012/2013    Due to my stomach issues and not being sorted. OK now    Stroke (H) age 25 approx    possible     Past Surgical History:  Past Surgical History:   Procedure Laterality Date    COLONOSCOPY N/A 04/27/2015    Procedure: COLONOSCOPY;  Surgeon: Pako Iqbal MD;  Location: [facility] OR    COLONOSCOPY WITH CO2 INSUFFLATION N/A 04/27/2015    Procedure: COLONOSCOPY WITH CO2 INSUFFLATION;  Surgeon: Pako Iqbal MD;  Location: [facility] OR    ESOPHAGOSCOPY, GASTROSCOPY, DUODENOSCOPY (EGD), COMBINED Left 05/28/2015    Procedure: COMBINED ESOPHAGOSCOPY, GASTROSCOPY, DUODENOSCOPY (EGD), BIOPSY SINGLE OR MULTIPLE;  Surgeon: Pako Iqbal MD;  Location: [facility]    HC BREATH HYDROGEN TEST N/A 09/11/2015    Procedure: HYDROGEN BREATH TEST;  Surgeon: Pako Iqbal MD;  Location: [facility]    HC TOOTH EXTRACTION W/FORCEP      age 15    testicular torsion  2010    UROLOGY PROCEDURE                         DATE:  01/01/2012    Testicular Torsion     Medications:  Current Outpatient Medications   Medication    apremilast (OTEZLA) 30 MG tablet    MITIGARE 0.6 MG capsule    omeprazole (PRILOSEC) 40 MG DR capsule    sertraline (ZOLOFT) 100 MG tablet    traZODone (DESYREL) 50 MG  tablet     No current facility-administered medications for this visit.   Currently on 40mg of prednisone daily    Allergies:  Allergies   Allergen Reactions    Ibuprofen GI Disturbance     Family history:  No family history of autoimmune disease, though sister does have oral ulcers.     Social History:  , no children  ETOH: rare  Smoking: Quit smoking in 2014  Drug use: No IVDU  Occupation: Works for [employer]    Objective:  No vitals or exams for today's video visit  Sitting up unassisted NAD, pleasant and interactive as always  No facial rash  No current oral ulcers  Sclera clear  Breathing comfortably without use of accessory muscles, no audible wheeze, no cough  Fluid movement of bilateral shoulders elbows wrists MCPs PIPs Dips throughout the encounter today  No acute cutaneous lesions appreciated on exposed skin     WBC   Date Value Ref Range Status   02/25/2020 6.2 4.0 - 11.0 10e9/L Final     WBC Count   Date Value Ref Range Status   05/30/2023 6.6 4.0 - 11.0 10e3/uL Final     Hemoglobin   Date Value Ref Range Status   05/30/2023 13.4 13.3 - 17.7 g/dL Final   02/25/2020 14.7 13.3 - 17.7 g/dL Final     Platelet Count   Date Value Ref Range Status   05/30/2023 188 150 - 450 10e3/uL Final   02/25/2020 152 150 - 450 10e9/L Final     Creatinine   Date Value Ref Range Status   05/30/2023 1.24 (H) 0.67 - 1.17 mg/dL Final   02/25/2020 1.03 0.66 - 1.25 mg/dL Final     Lab Results   Component Value Date    ALKPHOS 66 06/17/2022    ALKPHOS 70 02/25/2020     AST   Date Value Ref Range Status   05/30/2023 23 10 - 50 U/L Final   02/25/2020 14 0 - 45 U/L Final     Lab Results   Component Value Date    ALT 21 06/17/2022    ALT 19 02/25/2020     Sed Rate   Date Value Ref Range Status   04/07/2015 7 0 - 15 mm/h Final     Erythrocyte Sedimentation Rate   Date Value Ref Range Status   05/30/2023 6 0 - 15 mm/hr Final   /  CRP Inflammation   Date Value Ref Range Status   04/07/2015 <2.9 0.0 - 8.0 mg/L Final     UA  RESULTS:  No results for input(s): COLOR, APPEARANCE, URINEGLC, URINEBILI, URINEKETONE, SG, UBLD, URINEPH, PROTEIN, UROBILINOGEN, NITRITE, LEUKEST, RBCU, WBCU in the last 92341 hours.   MARVIN Screen by EIA   Date Value Ref Range Status   05/05/2015 <1.0  Interpretation:  Negative   <1.0 Final     DNA (ds) Antibody   Date Value Ref Range Status   09/14/2022 0.6 <10.0 IU/mL Final     Comment:     Negative     RNP Antibody IgG   Date Value Ref Range Status   09/14/2022 Negative Negative Final     Rheumatoid Factor   Date Value Ref Range Status   09/14/2022 <6 <12 IU/mL Final   05/05/2015 <20 <20 IU/mL Final     Cyclic Citrullinated Peptide Antibody IgG   Date Value Ref Range Status   09/14/2022 <0.4 <7.0 U/mL Final     Comment:     Negative     Myeloperoxidase Antibody IgG   Date Value Ref Range Status   09/14/2022 Negative Negative Final     Proteinase 3 Antibody IgG   Date Value Ref Range Status   09/14/2022 Negative Negative Final     Imaging  8/24/22  CT abdomen and pelvis with contrast at [facility] ED available in care everywhere  FINDINGS:   Lung bases clear     Small cyst left hepatic lobe near the dome.  A few other tiny cystic lesions   too small to characterize though likely also small cysts.     Unremarkable gallbladder     Unremarkable spleen and adrenal glands.     In normal pancreas.     Normal perfusion of kidneys.  Unremarkable bladder     No inflamed or obstructed bowel.  No significant atherosclerotic   calcifications.  Proximal portion of the major midline vessels patent.     Minor ill-defined soft tissue density around the celiac artery, origin of the   superior mesenteric artery and around the proximal segment of the hepatic   arteries.  Mild narrowing at the origin of the celiac artery.     No enlarged lymph nodes.     Para-aortic fat appeared normal without findings of adenopathy or   retroperitoneal fibrosis around the aorta.  Negative for retroperitoneal mass.    Impression  1. Minor soft tissue  density and fat stranding around origin of celiac artery,   right hepatic artery and to lesser extent superior mesenteric artery.  Some   component of retroperitoneal fibrosis, vasculitis or less likely   lymphoproliferative malignancy are differentials.  The mild narrowing of celiac   artery with slight inferior displacement at origin less likely to reflect a   component of median arcuate ligament syndrome.   2. Negative for adenopathy   3. Negative for findings of retroperitoneal fibrosis along aorta margins   4. No significant discrepancy with preliminary   5.  CODE 3:  Report contains unexpected findings requiring further evaluation   or follow-up.    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     COMPARISON: February 25, 2020     TECHNIQUE: Multiplanar multiformatted CTA images were obtained from  the lung apices through the chest, abdomen, and pelvis after the  uneventful administration of Isovue-370 intravenous contrast given for  a total of 80 mL. Radiation dose for this scan was reduced using  automated exposure control, adjustment of the mA and/or kV according  to patient size, or iterative reconstruction technique. Three-D  reformatted images were created at a separate workstation.     FINDINGS: The visible thyroid gland is unremarkable. No abnormally  enlarged mediastinal lymph nodes. Heart size is normal. No acute  osseous abnormality. No pulmonary masses.     The gallbladder is contracted. The liver has a small hypodensity in  the left hepatic lobe, measuring 1 cm in size, present on previous  exam, perhaps minimally increased since that time. The spleen, adrenal  glands, and pancreas are unremarkable. Both kidneys are normally  perfused. No hydronephrosis or nephrolithiasis. No intraperitoneal  fluid or air. The bladder is mostly decompressed. Left hydrocele is  partially visible. No dilated loops of bowel.     The  "ascending thoracic aorta is 2.6 x 2.7 cm. The aortic arch is 2 cm.  Origins of the great arteries are widely patent. Descending thoracic  aorta is normal in size and appearance. The celiac axis, SMA,  bilateral renal, and inferior mesenteric arteries are patent. The  abdominal aorta is normal in size and appearance. No wall thickening  or surrounding inflammatory change. Both common iliac, internal iliac,  external iliac, and common femoral arteries are patent.                                                                      IMPRESSION:  1. Patent thoracoabdominal aorta. No aneurysmal dilatation or  suggestion of inflammation. No stenosis.  2. Visceral arteries are patent and appear unremarkable.  3. Tiny hypodensity in the left hepatic lobe is 1 cm, minimally  increased in size from previous exam.    A/P  42 year old male was referred to rheumatology for evaluation of vasculitis in the setting of a ED evaluation on 8/24/22 for severe abdominal pain during which a CT abdomen/pelvis with contrast was obtained and showed minor soft tissue density and fat stranding around the origin of the celiac artery, right hepatic artery and to a lesser extent superior mesenteric artery.     His history is notable for  1. Recurrent oral ulcerations: 5-12 at one time. Occur at least monthly. Infectious work-up negative. Started around age 22  2. Recurrent nasal ulcerations: occur at the same time as his oral ulcers  3. 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 Has had scarring on his scrotum due to prior lesions  4. Recurrent abdominal pain associated with bloody stool (sometimes bright red sometimes black/tarry): occurs at the same time as his oral/nasal ulcers.  5. Has a history of what sounds like a TIA. Was told it was a \"mini stroke\". This was in the [place]. No records are available to review in our system.     No evidence of cutaneous lesions or occular inflammation by " history/exam/review of the record.     Taken together concern for vasculitis and more specifically behcets given his clinical history/findings. Additional work-up to ensure no other process was driving these findings was pursued and included  CBC, CMP, ESR, CRP, C3, C4, cryo, MARVIN, SSA, SSB, Smith, RNP, RF, CCP, ANCA, MPO, PR3, IgG subclasses, treponema with reflex, Hep B/C/HIV/quant gold, ACE, 1,25 dihydroxyvitamin D all of which was negative/normal. I also obtained a TPMT enzyme which was normal.  He presented to me on prednisone 40mg daily which was started in the ED and taper plan established: 9/7- 9/20 40mg daily then 9/21- 10/4 30mg daily then 10/5-10/18 20 mg daily then 10/19-11/1 17.5 mg daily then 11/2-11/15  15 mg daily then 11/16-11/29 12.5mg daily then  11/30 start 10mg daily then 7.5mg daily x2 weeks then 5mg daily x2 weeks then 2.5mg daily x2 weeks then stop. We obtained repeat imaging on 10/5/22 with CTA chest abdomen pelvis which showed complete resolution of the previously noted inflammatory findings concerning for vasculitis. He was started on colchicine 0.6mg BID and he successfully tapered off of prednisone.  Due to ongoing active symptoms consistent with prior flares which were occurring through colchicine 0.6 mg twice daily he was started on apremilast 30 mg twice daily.  He developed significant GI/GERD symptoms on apremilast and so we tried to decrease the dose of this to once daily which was initially helpful, but his GI upset/side effects returned and so this was the stopped as well. He continues on colchicine 0.6mg BID.     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 If he should continue to have active disease despite addition of TNF inhibitor, will plan to add AZA to his current regimen. Discussed this plan in detail with him today. He is in agreement with the plan. Risks and benefits of TNF inhibitor  discussed today to include infection, injection site reactions, anaphylaxis, demyelinating disease, GI/hepatoxicity, bowel perforation, malignancy among others. Patient is agreeable to start.     High risk medication use: Apremilast stopped since last visit. On colchicine and continuing. Starting TNF inhibitor humira, see discussion above.   - Risk benefits of both medications discussed to include infection, GI/bladder toxicity, bone marrow suppression, rash, GI upset, malignancy among others.  He is agreeable given the benefit he is already achieved and lack of side effects at this time though unable to determine whether the current GI upset is secondary to either the colchicine or apremilast as we try to determine this with the addition of proton pump inhibitor as outlined above.  - He is overdue for routine screening drug toxicity monitoring labs.  Standing orders are in place for CBC and CMP.  We will call him for check out and schedule lab appointment in the next 1-2 weeks.      Will follow-up with me in the next 3-4 months or sooner with new/progressive symptoms. Should he have a significant flare of vasculitis, he has prednisone on hand that he knows to start and let me know if he uses so that I can track along with frequency of flares in between visits and also refill.     Jimmy Vann MD  Rheumatology

## 2023-07-14 NOTE — NURSING NOTE
Is the patient currently in the state of [place]? YES    Visit mode:VIDEO    If the visit is dropped, the patient can be reconnected by: VIDEO VISIT: Text to cell phone: [phone]    Will anyone else be joining the visit? NO      How would you like to obtain your AVS? MyChart    Are changes needed to the allergy or medication list? YES: pt is not taking Otezla 30mg per pt    Reason for visit: RECHECK      No other pt vitals to report per pt    Jennifer Hinson Vf

## 2023-07-15 DIAGNOSIS — M35.2 BEHCET'S DISEASE (H): ICD-10-CM

## 2023-07-17 ENCOUNTER — TELEPHONE (OUTPATIENT)
Dept: FAMILY MEDICINE | Facility: CLINIC | Age: 43
End: 2023-07-17

## 2023-07-17 ENCOUNTER — TELEPHONE (OUTPATIENT)
Dept: RHEUMATOLOGY | Facility: CLINIC | Age: 43
End: 2023-07-17

## 2023-07-17 NOTE — TELEPHONE ENCOUNTER
PA Initiation    Medication: HUMIRA *CF* PEN 40 MG/0.4ML SC PNKT  Insurance Company: [employer] ([facility]) - Phone [phone] Fax [phone]  Pharmacy Filling the Rx: [facility] - [place], [place] - [address]  Filling Pharmacy Phone:    Filling Pharmacy Fax:    Start Date: 7/17/2023

## 2023-07-17 NOTE — TELEPHONE ENCOUNTER
Called patient. They are scheduled for an in-person visit on 7/28. Records should be in MyChart through a Dr. Ryder.    Jeannine Elizondo

## 2023-07-17 NOTE — TELEPHONE ENCOUNTER
Patient had recent ADHD diagnosis and was told to follow-up at a primary clinic. Patient most recently sawe Dr. Cerna, who is scheduling out until late September. Ok to work into schedule?    Jeannine Elizondo

## 2023-07-17 NOTE — TELEPHONE ENCOUNTER
Yes.  Please also advise Andrew to upload paperwork documenting diagnosis through MyChart or arrange to sign LUCA.    Sabas Cerna MD

## 2023-07-18 ENCOUNTER — TELEPHONE (OUTPATIENT)
Dept: RHEUMATOLOGY | Facility: CLINIC | Age: 43
End: 2023-07-18
Payer: COMMERCIAL

## 2023-07-18 RX ORDER — COLCHICINE 0.6 MG/1
0.6 CAPSULE ORAL 2 TIMES DAILY
Qty: 60 CAPSULE | Refills: 5 | Status: SHIPPED | OUTPATIENT
Start: 2023-07-18 | End: 2023-12-26

## 2023-07-18 NOTE — TELEPHONE ENCOUNTER
Medication/Dose: MITIGARE 0.6 MG capsule       Last Written:  11/8/22  Last Fill Quantity: 60,   # refills: 5  Last Office Visit : 7/14/23  Future Office visit:  9/15/23    CBC RESULTS:   Recent Labs   Lab Test 05/30/23  1434   WBC 6.6   RBC 4.32*   HGB 13.4   HCT 37.2*   MCV 86   MCH 31.0   MCHC 36.0   RDW 12.2          Creatinine   Date Value Ref Range Status   05/30/2023 1.24 (H) 0.67 - 1.17 mg/dL Final   02/25/2020 1.03 0.66 - 1.25 mg/dL Final     Prescription set up and routed to provider as does not meet protocol.    Dimitrios Alejandre, BSN, RN  [facility] Refill Team

## 2023-07-18 NOTE — TELEPHONE ENCOUNTER
PAUL and sudheer sent for the patient to call back and schedule the following:    Appointment type: Return Friday Video  Provider: Dr. Vann  Return date: November 2023  Specialty phone number: [phone]  Additional appointment(s) needed: Labs in the next 1-2 weeks   Additonal Notes:Follow-up in 4 months, video on a friday

## 2023-07-25 NOTE — TELEPHONE ENCOUNTER
PRIOR AUTHORIZATION DENIED    Medication: HUMIRA *CF* PEN 40 MG/0.4ML SC PNKT  Insurance Company: [employer] ([facility]) - Phone [phone] Fax [phone]  Denial Date: 7/25/2023  Denial Rational: not covered diagnosis    Appeal Information:    Peer to peer option      Patient Notified: Yes

## 2023-07-28 ENCOUNTER — OFFICE VISIT (OUTPATIENT)
Dept: FAMILY MEDICINE | Facility: CLINIC | Age: 43
End: 2023-07-28
Payer: COMMERCIAL

## 2023-07-28 VITALS
OXYGEN SATURATION: 97 % | RESPIRATION RATE: 9 BRPM | HEIGHT: 76 IN | HEART RATE: 76 BPM | DIASTOLIC BLOOD PRESSURE: 77 MMHG | SYSTOLIC BLOOD PRESSURE: 139 MMHG | BODY MASS INDEX: 23.6 KG/M2 | WEIGHT: 193.8 LBS | TEMPERATURE: 98.5 F

## 2023-07-28 DIAGNOSIS — M35.2 BEHCET'S DISEASE (H): ICD-10-CM

## 2023-07-28 DIAGNOSIS — Z79.899 HIGH RISK MEDICATION USE: ICD-10-CM

## 2023-07-28 DIAGNOSIS — F90.2 ADHD (ATTENTION DEFICIT HYPERACTIVITY DISORDER), COMBINED TYPE: Primary | ICD-10-CM

## 2023-07-28 LAB
ALBUMIN SERPL BCG-MCNC: 4.5 G/DL (ref 3.5–5.2)
ALP SERPL-CCNC: 62 U/L (ref 40–129)
ALT SERPL W P-5'-P-CCNC: 19 U/L (ref 0–70)
ANION GAP SERPL CALCULATED.3IONS-SCNC: 10 MMOL/L (ref 7–15)
AST SERPL W P-5'-P-CCNC: 20 U/L (ref 0–45)
BASOPHILS # BLD AUTO: 0 10E3/UL (ref 0–0.2)
BASOPHILS NFR BLD AUTO: 0 %
BILIRUB SERPL-MCNC: 0.3 MG/DL
BUN SERPL-MCNC: 16.6 MG/DL (ref 6–20)
CALCIUM SERPL-MCNC: 9.3 MG/DL (ref 8.6–10)
CHLORIDE SERPL-SCNC: 102 MMOL/L (ref 98–107)
CREAT SERPL-MCNC: 1.09 MG/DL (ref 0.67–1.17)
CRP SERPL-MCNC: <3 MG/L
DEPRECATED HCO3 PLAS-SCNC: 26 MMOL/L (ref 22–29)
EOSINOPHIL # BLD AUTO: 0 10E3/UL (ref 0–0.7)
EOSINOPHIL NFR BLD AUTO: 0 %
ERYTHROCYTE [DISTWIDTH] IN BLOOD BY AUTOMATED COUNT: 12 % (ref 10–15)
ERYTHROCYTE [SEDIMENTATION RATE] IN BLOOD BY WESTERGREN METHOD: 6 MM/HR (ref 0–15)
GFR SERPL CREATININE-BSD FRML MDRD: 87 ML/MIN/1.73M2
GLUCOSE SERPL-MCNC: 104 MG/DL (ref 70–99)
HCT VFR BLD AUTO: 40.1 % (ref 40–53)
HGB BLD-MCNC: 13.9 G/DL (ref 13.3–17.7)
IMM GRANULOCYTES # BLD: 0 10E3/UL
IMM GRANULOCYTES NFR BLD: 0 %
LYMPHOCYTES # BLD AUTO: 1.4 10E3/UL (ref 0.8–5.3)
LYMPHOCYTES NFR BLD AUTO: 18 %
MCH RBC QN AUTO: 30.5 PG (ref 26.5–33)
MCHC RBC AUTO-ENTMCNC: 34.7 G/DL (ref 31.5–36.5)
MCV RBC AUTO: 88 FL (ref 78–100)
MONOCYTES # BLD AUTO: 0.5 10E3/UL (ref 0–1.3)
MONOCYTES NFR BLD AUTO: 6 %
NEUTROPHILS # BLD AUTO: 6.2 10E3/UL (ref 1.6–8.3)
NEUTROPHILS NFR BLD AUTO: 76 %
PLATELET # BLD AUTO: 211 10E3/UL (ref 150–450)
POTASSIUM SERPL-SCNC: 3.9 MMOL/L (ref 3.4–5.3)
PROT SERPL-MCNC: 6.8 G/DL (ref 6.4–8.3)
RBC # BLD AUTO: 4.55 10E6/UL (ref 4.4–5.9)
SODIUM SERPL-SCNC: 138 MMOL/L (ref 136–145)
WBC # BLD AUTO: 8.1 10E3/UL (ref 4–11)

## 2023-07-28 PROCEDURE — 80053 COMPREHEN METABOLIC PANEL: CPT | Performed by: FAMILY MEDICINE

## 2023-07-28 PROCEDURE — 85025 COMPLETE CBC W/AUTO DIFF WBC: CPT | Performed by: FAMILY MEDICINE

## 2023-07-28 PROCEDURE — 85652 RBC SED RATE AUTOMATED: CPT | Performed by: FAMILY MEDICINE

## 2023-07-28 PROCEDURE — 36415 COLL VENOUS BLD VENIPUNCTURE: CPT | Performed by: FAMILY MEDICINE

## 2023-07-28 PROCEDURE — 99214 OFFICE O/P EST MOD 30 MIN: CPT | Performed by: FAMILY MEDICINE

## 2023-07-28 PROCEDURE — 86140 C-REACTIVE PROTEIN: CPT | Performed by: FAMILY MEDICINE

## 2023-07-28 RX ORDER — DEXTROAMPHETAMINE SACCHARATE, AMPHETAMINE ASPARTATE, DEXTROAMPHETAMINE SULFATE AND AMPHETAMINE SULFATE 1.25; 1.25; 1.25; 1.25 MG/1; MG/1; MG/1; MG/1
5 TABLET ORAL 2 TIMES DAILY
Qty: 14 TABLET | Refills: 0 | Status: SHIPPED | OUTPATIENT
Start: 2023-07-28 | End: 2023-08-28

## 2023-07-28 RX ORDER — DEXTROAMPHETAMINE SACCHARATE, AMPHETAMINE ASPARTATE, DEXTROAMPHETAMINE SULFATE AND AMPHETAMINE SULFATE 2.5; 2.5; 2.5; 2.5 MG/1; MG/1; MG/1; MG/1
10 TABLET ORAL 2 TIMES DAILY
Qty: 14 TABLET | Refills: 0 | Status: SHIPPED | OUTPATIENT
Start: 2023-07-28 | End: 2023-08-28

## 2023-07-28 RX ORDER — DEXTROAMPHETAMINE SACCHARATE, AMPHETAMINE ASPARTATE, DEXTROAMPHETAMINE SULFATE AND AMPHETAMINE SULFATE 3.75; 3.75; 3.75; 3.75 MG/1; MG/1; MG/1; MG/1
15 TABLET ORAL 2 TIMES DAILY
Qty: 14 TABLET | Refills: 0 | Status: SHIPPED | OUTPATIENT
Start: 2023-07-28 | End: 2023-08-28

## 2023-07-28 ASSESSMENT — ENCOUNTER SYMPTOMS
NERVOUS/ANXIOUS: 0
CONSTITUTIONAL NEGATIVE: 1
ABDOMINAL PAIN: 0
HEADACHES: 0
SLEEP DISTURBANCE: 0
PALPITATIONS: 0

## 2023-07-28 ASSESSMENT — PAIN SCALES - GENERAL: PAINLEVEL: NO PAIN (0)

## 2023-07-28 NOTE — PROGRESS NOTES
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 Wants to discuss starting medication.  Needs a blood test for kreatin levels per Dr. Jimmy Vann from [facility].    Has been evaluated by neuropsych and they have confirmed diagnosis.  No previous medication use.    Denies CP, palpitations, edema, dyspnea, HA, vision changes.      History of Present Illness       Reason for visit:  Discuss possible medication    He eats 2-3 servings of fruits and vegetables daily.He consumes 1 sweetened beverage(s) daily.He exercises with enough effort to increase his heart rate 10 to 19 minutes per day.  He exercises with enough effort to increase his heart rate 4 days per week.   He is taking medications regularly.       Review of Systems   Constitutional: Negative.    Cardiovascular:  Negative for palpitations.   Gastrointestinal:  Negative for abdominal pain.   Neurological:  Negative for headaches.   Psychiatric/Behavioral:  Negative for sleep disturbance. The patient is not nervous/anxious.             Objective    /77 (BP Location: Right arm, Patient Position: Sitting, Cuff Size: Adult Regular)   Pulse 76   Temp 98.5  F (36.9  " C) (Oral)   Resp (!) 9   Ht 1.93 m (6' 4\")   Wt 87.9 kg (193 lb 12.8 oz)   SpO2 97%   BMI 23.59 kg/m    Body mass index is 23.59 kg/m .  Physical Exam  Vitals and nursing note reviewed.   Constitutional:       Appearance: Normal appearance.   Skin:     General: Skin is warm and dry.   Neurological:      General: No focal deficit present.      Mental Status: He is alert and oriented to person, place, and time.   Psychiatric:         Mood and Affect: Mood normal.         Behavior: Behavior normal.                      "

## 2023-08-03 ENCOUNTER — OFFICE VISIT (OUTPATIENT)
Dept: FAMILY MEDICINE | Facility: CLINIC | Age: 43
End: 2023-08-03
Payer: COMMERCIAL

## 2023-08-03 VITALS
WEIGHT: 191 LBS | SYSTOLIC BLOOD PRESSURE: 117 MMHG | HEART RATE: 74 BPM | TEMPERATURE: 98.5 F | HEIGHT: 76 IN | BODY MASS INDEX: 23.26 KG/M2 | RESPIRATION RATE: 16 BRPM | DIASTOLIC BLOOD PRESSURE: 74 MMHG | OXYGEN SATURATION: 100 %

## 2023-08-03 DIAGNOSIS — Z00.00 ENCOUNTER FOR SCREENING AND PREVENTATIVE CARE: Primary | ICD-10-CM

## 2023-08-03 PROCEDURE — 99396 PREV VISIT EST AGE 40-64: CPT | Performed by: FAMILY MEDICINE

## 2023-08-03 ASSESSMENT — PAIN SCALES - GENERAL: PAINLEVEL: NO PAIN (0)

## 2023-08-03 ASSESSMENT — ENCOUNTER SYMPTOMS
SHORTNESS OF BREATH: 0
PALPITATIONS: 0
HEADACHES: 0
CONSTITUTIONAL NEGATIVE: 1

## 2023-08-03 NOTE — PROGRESS NOTES
"  Assessment and Plan    (Z00.00) Encounter for screening and preventative care  (primary encounter diagnosis)  Comment: form completed, will arrange to return for fasting labs  Plan: Lipid panel reflex to direct LDL Fasting,         PRIMARY CARE FOLLOW-UP SCHEDULING              RTC in 1y CPE    Sabas Cerna MD      Dameon Morales is a 43 year old, presenting for the following health issues:  Forms        8/3/2023     3:10 PM   Additional Questions   Roomed by cora barlow cma   Accompanied by self         8/3/2023     3:10 PM   Patient Reported Additional Medications   Patient reports taking the following new medications na         Routine CPE.  Has form for adoption agency - needs to be filled out each year.    Feeling well, no acute concerns.    Review of Systems   Constitutional: Negative.    Eyes:  Negative for visual disturbance.   Respiratory:  Negative for shortness of breath.    Cardiovascular:  Negative for chest pain, palpitations and peripheral edema.   Neurological:  Negative for headaches.            Objective    /74   Pulse 74   Temp 98.5  F (36.9  C) (Oral)   Resp 16   Ht 1.93 m (6' 4\")   Wt 86.6 kg (191 lb)   SpO2 100%   BMI 23.25 kg/m    Body mass index is 23.25 kg/m .  Physical Exam  Vitals and nursing note reviewed.   Constitutional:       Appearance: He is well-developed.   HENT:      Head: Normocephalic and atraumatic.      Right Ear: Tympanic membrane, ear canal and external ear normal.      Left Ear: Tympanic membrane, ear canal and external ear normal.   Eyes:      Pupils: Pupils are equal, round, and reactive to light.   Neck:      Thyroid: No thyromegaly.   Cardiovascular:      Rate and Rhythm: Normal rate and regular rhythm.      Heart sounds: Normal heart sounds.   Pulmonary:      Effort: Pulmonary effort is normal.      Breath sounds: Normal breath sounds.   Abdominal:      General: Bowel sounds are normal.      Palpations: Abdomen is soft. There is no mass. "   Musculoskeletal:         General: Normal range of motion.   Lymphadenopathy:      Cervical: No cervical adenopathy.   Skin:     General: Skin is warm and dry.      Findings: No rash.   Neurological:      Mental Status: He is alert and oriented to person, place, and time.   Psychiatric:         Judgment: Judgment normal.

## 2023-08-03 NOTE — TELEPHONE ENCOUNTER
Medication Appeal Initiation    We have initiated an appeal for the requested medication:  Medication: HUMIRA *CF* PEN 40 MG/0.4ML SC PNKT  Appeal Start Date:  8/3/2023  Insurance Company: [facility]  Insurance Phone: [phone]  Insurance Fax: [phone]  Comments:  FOLLOW UP [phone], SENT VIA FAX

## 2023-08-08 ENCOUNTER — LAB (OUTPATIENT)
Dept: LAB | Facility: CLINIC | Age: 43
End: 2023-08-08
Payer: COMMERCIAL

## 2023-08-08 DIAGNOSIS — Z00.00 ENCOUNTER FOR SCREENING AND PREVENTATIVE CARE: ICD-10-CM

## 2023-08-08 LAB
CHOLEST SERPL-MCNC: 226 MG/DL
HDLC SERPL-MCNC: 48 MG/DL
LDLC SERPL CALC-MCNC: 140 MG/DL
NONHDLC SERPL-MCNC: 178 MG/DL
TRIGL SERPL-MCNC: 189 MG/DL

## 2023-08-08 PROCEDURE — 80061 LIPID PANEL: CPT

## 2023-08-08 PROCEDURE — 36415 COLL VENOUS BLD VENIPUNCTURE: CPT

## 2023-08-21 NOTE — TELEPHONE ENCOUNTER
MEDICATION APPEAL APPROVED    Medication: HUMIRA *CF* PEN 40 MG/0.4ML SC PNKT  Authorization Effective Date: 8/21/2023  Authorization Expiration Date: 8/18/2024  Approved Dose/Quantity: [id number]  Reference #: [id number]   Appeal Insurance Company: [employer]  Expected CoPay:       CoPay Card Available:    Financial Assistance Needed: no  Which Pharmacy is filling the prescription: [facility] - [place], [place] - [address]  Patient Notified: yes

## 2023-08-28 ENCOUNTER — VIRTUAL VISIT (OUTPATIENT)
Dept: FAMILY MEDICINE | Facility: CLINIC | Age: 43
End: 2023-08-28
Attending: FAMILY MEDICINE
Payer: COMMERCIAL

## 2023-08-28 DIAGNOSIS — F90.2 ADHD (ATTENTION DEFICIT HYPERACTIVITY DISORDER), COMBINED TYPE: ICD-10-CM

## 2023-08-28 PROCEDURE — 99213 OFFICE O/P EST LOW 20 MIN: CPT | Mod: VID | Performed by: FAMILY MEDICINE

## 2023-08-28 RX ORDER — DEXTROAMPHETAMINE SACCHARATE, AMPHETAMINE ASPARTATE, DEXTROAMPHETAMINE SULFATE AND AMPHETAMINE SULFATE 2.5; 2.5; 2.5; 2.5 MG/1; MG/1; MG/1; MG/1
10 TABLET ORAL 2 TIMES DAILY
Qty: 60 TABLET | Refills: 0 | Status: SHIPPED | OUTPATIENT
Start: 2023-09-28 | End: 2023-10-28

## 2023-08-28 RX ORDER — DEXTROAMPHETAMINE SACCHARATE, AMPHETAMINE ASPARTATE, DEXTROAMPHETAMINE SULFATE AND AMPHETAMINE SULFATE 2.5; 2.5; 2.5; 2.5 MG/1; MG/1; MG/1; MG/1
10 TABLET ORAL 2 TIMES DAILY
Qty: 60 TABLET | Refills: 0 | Status: SHIPPED | OUTPATIENT
Start: 2023-10-29 | End: 2023-11-28

## 2023-08-28 RX ORDER — DEXTROAMPHETAMINE SACCHARATE, AMPHETAMINE ASPARTATE, DEXTROAMPHETAMINE SULFATE AND AMPHETAMINE SULFATE 2.5; 2.5; 2.5; 2.5 MG/1; MG/1; MG/1; MG/1
10 TABLET ORAL 2 TIMES DAILY
Qty: 60 TABLET | Refills: 0 | Status: SHIPPED | OUTPATIENT
Start: 2023-08-28 | End: 2023-09-27

## 2023-08-28 ASSESSMENT — ENCOUNTER SYMPTOMS
HEADACHES: 0
PALPITATIONS: 0
ABDOMINAL PAIN: 0
NERVOUS/ANXIOUS: 0
SLEEP DISTURBANCE: 0
CONSTITUTIONAL NEGATIVE: 1

## 2023-10-08 ENCOUNTER — MYC MEDICAL ADVICE (OUTPATIENT)
Dept: RHEUMATOLOGY | Facility: CLINIC | Age: 43
End: 2023-10-08
Payer: COMMERCIAL

## 2023-10-09 NOTE — TELEPHONE ENCOUNTER
Andrew called back and requested Franica please call him back today he's available after 2 pm to speak

## 2023-10-09 NOTE — TELEPHONE ENCOUNTER
Patient calling with flare up of mouth and nasal sores.  He is due to take his 4th humira tomorrow.  He started the prednisone taper prescribed 8/10/23 this weekend.  With this flare up, he has also had GI issues, which is not uncommon when he gets a flare.  Also taking colchicine 0.6mg BID.  He wanted to make sure you knew about the flare and to ask if you wanted him to do anything different.     Francia De La O RN

## 2023-10-10 NOTE — TELEPHONE ENCOUNTER
"LM for patient to call back.     Per Dr Vann: \"Flare noted. Continue with plan. I have refilled his prednisone so that he has it on hand for his next flare.\"    Francia De La O RN    "

## 2023-10-25 ENCOUNTER — TELEPHONE (OUTPATIENT)
Dept: RHEUMATOLOGY | Facility: CLINIC | Age: 43
End: 2023-10-25
Payer: COMMERCIAL

## 2023-10-25 NOTE — TELEPHONE ENCOUNTER
Left Voicemail (1st Attempt) for the patient to call back and schedule the following:    Appointment type: Video visit return  Provider: Dr. Vann  Return date: December 22nd 2023  Specialty phone number: [phone]  Additional appointment(s) needed: NA  Additonal Notes: Reschedule attempt as Dr. Vann will be out of office December 22nd 2023. FABIAN slot use has been approved by Dr. Vann for reschedule.

## 2023-11-03 ENCOUNTER — TELEPHONE (OUTPATIENT)
Dept: RHEUMATOLOGY | Facility: CLINIC | Age: 43
End: 2023-11-03
Payer: COMMERCIAL

## 2023-11-03 NOTE — TELEPHONE ENCOUNTER
Left Voicemail (2nd Attempt) for the patient to call back and schedule the following:    Appointment type: Return visit  Provider: Dr. Vann  Return date: Next available  Specialty phone number: [phone]  Additional appointment(s) needed: NA  Additonal Notes: 2nd reschedule attempt as Dr. Vann is out of office 12/22/2023. Canceling appt.

## 2023-11-10 ENCOUNTER — IMMUNIZATION (OUTPATIENT)
Dept: FAMILY MEDICINE | Facility: CLINIC | Age: 43
End: 2023-11-10
Payer: COMMERCIAL

## 2023-11-10 DIAGNOSIS — Z23 ENCOUNTER FOR IMMUNIZATION: Primary | ICD-10-CM

## 2023-11-10 PROCEDURE — 99207 PR NO CHARGE NURSE ONLY: CPT

## 2023-11-10 PROCEDURE — 90480 ADMN SARSCOV2 VAC 1/ONLY CMP: CPT

## 2023-11-10 PROCEDURE — 90471 IMMUNIZATION ADMIN: CPT

## 2023-11-10 PROCEDURE — 90472 IMMUNIZATION ADMIN EACH ADD: CPT

## 2023-11-10 PROCEDURE — 90686 IIV4 VACC NO PRSV 0.5 ML IM: CPT

## 2023-11-10 PROCEDURE — 91320 SARSCV2 VAC 30MCG TRS-SUC IM: CPT

## 2023-11-10 PROCEDURE — 90677 PCV20 VACCINE IM: CPT

## 2023-11-10 NOTE — PROGRESS NOTES
Prior to immunization administration, verified patients identity using patient s name and date of birth. Please see Immunization Activity for additional information.     Screening Questionnaire for Adult Immunization    Are you sick today?   No   Do you have allergies to medications, food, a vaccine component or latex?   No   Have you ever had a serious reaction after receiving a vaccination?   No   Do you have a long-term health problem with heart, lung, kidney, or metabolic disease (e.g., diabetes), asthma, a blood disorder, no spleen, complement component deficiency, a cochlear implant, or a spinal fluid leak?  Are you on long-term aspirin therapy?   No   Do you have cancer, leukemia, HIV/AIDS, or any other immune system problem?   No   Do you have a parent, brother, or sister with an immune system problem?   No   In the past 3 months, have you taken medications that affect  your immune system, such as prednisone, other steroids, or anticancer drugs; drugs for the treatment of rheumatoid arthritis, Crohn s disease, or psoriasis; or have you had radiation treatments?   No   Have you had a seizure, or a brain or other nervous system problem?   No   During the past year, have you received a transfusion of blood or blood    products, or been given immune (gamma) globulin or antiviral drug?   No   For women: Are you pregnant or is there a chance you could become       pregnant during the next month?   No   Have you received any vaccinations in the past 4 weeks?   No     Immunization questionnaire answers were all negative.    I have reviewed the following standing orders:   This patient is due and qualifies for the Covid-19 vaccine.     Click here for COVID-19 Standing Order    I have reviewed the vaccines inclusion and exclusion criteria; No concerns regarding eligibility.     This patient is due and qualifies for the Influenza vaccine.    Click here for Influenza Vaccine Standing Order    I have reviewed the  vaccines inclusion and exclusion criteria; No concerns regarding eligibility.       This patient is due and qualifies for the Pneumococcal vaccine.    Click here for Pneumococcal (Adult) Standing Order    I have reviewed the vaccines inclusion and exclusion criteria;No concerns regarding eligibility.     Patient instructed to remain in clinic for 15 minutes afterwards, and to report any adverse reactions.     Screening performed by Dudley Georges MA on 11/10/2023 at 2:09 PM.

## 2023-11-14 ASSESSMENT — ANXIETY QUESTIONNAIRES
5. BEING SO RESTLESS THAT IT IS HARD TO SIT STILL: NOT AT ALL
GAD7 TOTAL SCORE: 2
7. FEELING AFRAID AS IF SOMETHING AWFUL MIGHT HAPPEN: NOT AT ALL
4. TROUBLE RELAXING: NOT AT ALL
IF YOU CHECKED OFF ANY PROBLEMS ON THIS QUESTIONNAIRE, HOW DIFFICULT HAVE THESE PROBLEMS MADE IT FOR YOU TO DO YOUR WORK, TAKE CARE OF THINGS AT HOME, OR GET ALONG WITH OTHER PEOPLE: SOMEWHAT DIFFICULT
3. WORRYING TOO MUCH ABOUT DIFFERENT THINGS: SEVERAL DAYS
2. NOT BEING ABLE TO STOP OR CONTROL WORRYING: SEVERAL DAYS
GAD7 TOTAL SCORE: 2
6. BECOMING EASILY ANNOYED OR IRRITABLE: NOT AT ALL
1. FEELING NERVOUS, ANXIOUS, OR ON EDGE: NOT AT ALL

## 2023-11-21 ENCOUNTER — OFFICE VISIT (OUTPATIENT)
Dept: FAMILY MEDICINE | Facility: CLINIC | Age: 43
End: 2023-11-21
Attending: FAMILY MEDICINE
Payer: COMMERCIAL

## 2023-11-21 VITALS
HEIGHT: 76 IN | OXYGEN SATURATION: 98 % | WEIGHT: 190 LBS | SYSTOLIC BLOOD PRESSURE: 131 MMHG | HEART RATE: 98 BPM | TEMPERATURE: 98.5 F | RESPIRATION RATE: 16 BRPM | DIASTOLIC BLOOD PRESSURE: 80 MMHG | BODY MASS INDEX: 23.14 KG/M2

## 2023-11-21 DIAGNOSIS — F90.2 ADHD (ATTENTION DEFICIT HYPERACTIVITY DISORDER), COMBINED TYPE: ICD-10-CM

## 2023-11-21 DIAGNOSIS — F33.1 MODERATE EPISODE OF RECURRENT MAJOR DEPRESSIVE DISORDER (H): ICD-10-CM

## 2023-11-21 DIAGNOSIS — F41.1 GENERALIZED ANXIETY DISORDER: ICD-10-CM

## 2023-11-21 PROCEDURE — 99214 OFFICE O/P EST MOD 30 MIN: CPT | Performed by: FAMILY MEDICINE

## 2023-11-21 RX ORDER — SERTRALINE HYDROCHLORIDE 100 MG/1
50 TABLET, FILM COATED ORAL DAILY
Qty: 90 TABLET | Refills: 1 | Status: SHIPPED | OUTPATIENT
Start: 2023-11-21 | End: 2024-02-01

## 2023-11-21 RX ORDER — DEXTROAMPHETAMINE SACCHARATE, AMPHETAMINE ASPARTATE, DEXTROAMPHETAMINE SULFATE AND AMPHETAMINE SULFATE 3.75; 3.75; 3.75; 3.75 MG/1; MG/1; MG/1; MG/1
15 TABLET ORAL 2 TIMES DAILY
Qty: 28 TABLET | Refills: 0 | Status: SHIPPED | OUTPATIENT
Start: 2023-11-21 | End: 2023-12-05

## 2023-11-21 RX ORDER — TRAZODONE HYDROCHLORIDE 50 MG/1
50 TABLET, FILM COATED ORAL AT BEDTIME
Qty: 90 TABLET | Refills: 1 | Status: SHIPPED | OUTPATIENT
Start: 2023-11-21 | End: 2024-05-15

## 2023-11-21 RX ORDER — DEXTROAMPHETAMINE SACCHARATE, AMPHETAMINE ASPARTATE, DEXTROAMPHETAMINE SULFATE AND AMPHETAMINE SULFATE 5; 5; 5; 5 MG/1; MG/1; MG/1; MG/1
20 TABLET ORAL 2 TIMES DAILY
Qty: 28 TABLET | Refills: 0 | Status: SHIPPED | OUTPATIENT
Start: 2023-11-21 | End: 2023-12-05

## 2023-11-21 ASSESSMENT — PAIN SCALES - GENERAL: PAINLEVEL: NO PAIN (0)

## 2023-11-21 ASSESSMENT — ENCOUNTER SYMPTOMS
CONSTITUTIONAL NEGATIVE: 1
SHORTNESS OF BREATH: 0
HEADACHES: 0
PALPITATIONS: 0

## 2023-11-21 NOTE — PROGRESS NOTES
Assessment and Plan    (F90.2) ADHD (attention deficit hyperactivity disorder), combined type  Comment: trial of next two higher doses to see if that gives an extension to duration.  If not, then will try LA types  Plan: amphetamine-dextroamphetamine (ADDERALL) 15 MG         tablet, amphetamine-dextroamphetamine         (ADDERALL) 20 MG tablet            (F41.1) Generalized anxiety disorder  Comment: mood stable, sleep is good  Plan: sertraline (ZOLOFT) 100 MG tablet, traZODone         (DESYREL) 50 MG tablet            (F33.1) Moderate episode of recurrent major depressive disorder (H)  Comment:   Plan: sertraline (ZOLOFT) 100 MG tablet              RTC in 1m video    Sabas Cerna MD      Dameon Morales is a 43 year old, presenting for the following health issues:  Recheck Medication        11/21/2023     4:46 PM   Additional Questions   Roomed by cheryl valdez   Accompanied by self         11/21/2023     4:46 PM   Patient Reported Additional Medications   Patient reports taking the following new medications na       History of Present Illness       Mental Health Follow-up:  Patient presents to follow-up on Depression & Anxiety.Patient's depression since last visit has been:  Medium  The patient is not having other symptoms associated with depression.  Patient's anxiety since last visit has been:  Medium  The patient is not having other symptoms associated with anxiety.  Any significant life events: relationship concerns  Patient is not feeling anxious or having panic attacks.  Patient has no concerns about alcohol or drug use.    Reason for visit:  Adhd    He eats 0-1 servings of fruits and vegetables daily.He consumes 1 sweetened beverage(s) daily.He exercises with enough effort to increase his heart rate 20 to 29 minutes per day.  He exercises with enough effort to increase his heart rate 4 days per week.   He is taking medications regularly.     - Was sick a few weeks ago and paused humira.     Mood stable, no  "partciular concerns with that.    Finds that Adderall tends to run out in about 2-1/2 hours instead of expected 4.  So two tabs doesn't cover a typical work day.  Intial med trial included 5, 10, and 15 mg, we decided to go with the 15.  Does note less appetite during the day.    Review of Systems   Constitutional: Negative.    Eyes:  Negative for visual disturbance.   Respiratory:  Negative for shortness of breath.    Cardiovascular:  Negative for chest pain, palpitations and peripheral edema.   Neurological:  Negative for headaches.          Objective    /80   Pulse 98   Temp 98.5  F (36.9  C) (Oral)   Resp 16   Ht 1.93 m (6' 4\")   Wt 86.2 kg (190 lb)   SpO2 98%   BMI 23.13 kg/m    Body mass index is 23.13 kg/m .  Physical Exam  Vitals and nursing note reviewed.   HENT:      Head: Normocephalic.   Eyes:      Conjunctiva/sclera: Conjunctivae normal.   Cardiovascular:      Rate and Rhythm: Normal rate and regular rhythm.      Heart sounds: Normal heart sounds.   Pulmonary:      Effort: Pulmonary effort is normal.      Breath sounds: Normal breath sounds.   Skin:     General: Skin is warm and dry.   Neurological:      General: No focal deficit present.      Mental Status: He is alert and oriented to person, place, and time.   Psychiatric:         Mood and Affect: Mood normal.         Behavior: Behavior normal.                      "

## 2023-12-15 NOTE — COMMUNITY RESOURCES LIST (ENGLISH)
12/15/2023   [facility] epacube  N/A  For questions about this resource list or additional care needs, please contact your primary care clinic or care manager.  Phone: [phone]   Email: N/A   Address: [facility] [place] [address]   Hours: N/A        Hotlines and Helplines       Hotline - Housing crisis  1  [facility] (Main Office) Distance: 9.29 miles      Phone/Virtual   [address] [place] [address]  Language: English  Hours: Mon - Sun Open 24 Hours   Phone: (378) 808-5848 Email: info@[facility] Website: http://[facility].[employer]     2  [facility] Distance: 15.62 miles      Phone/Virtual   [address] [place] [address]  Language: English  Hours: Mon - Sun Open 24 Hours   Phone: (300) 345-1451 Email: communications@[facility].org Website: https://[facility]-[place].org/oursaviourshousing/          Housing       Coordinated Entry access point  3  [facility] Distance: 13.99 miles      In-Person, Phone/Virtual   [address] [place] [address]  Language: English  Hours: Mon - Fri 8:30 AM - 4:30 PM  Fees: Free   Phone: (644) 506-1412 Email: info@[facility].org Website: https://www.[facility].org/locations/[facility]/     4  [facility] ([facility] - Housing Services Distance: 15.58 miles      In-Person   [address] [place] [address]  Language: English  Hours: Mon - Fri 9:00 AM - 5:00 PM  Fees: Free   Phone: (943) 840-4968 Email: housing@[facility].org Website: http://www.[facility].org/housing     Drop-in center or day shelter  5  [facility] Distance: 15.09 miles      In-Person   [address] [place] [address]  Language: English  Hours: Mon - Fri 9:00 AM - 4:00 PM  Fees: Free   Phone: (352) 293-8738 Email: radha@[employer].org Website: http://[employer].org     6  [facility] Distance: 16 miles      In-Person   [address] [place]  [address]  Language: English  Hours: Mon - Fri 12:00 PM - 3:00 PM  Fees: Free   Phone: (889) 173-7963 Email: Liquipel@[employer].bepretty Website: http://[employer]/     Housing search assistance  7  [facility] - Rental Homes for Future Homebuyers Program Distance: 9.09 miles      Phone/Virtual   [address] [place] [address]  Language: English  Hours: Mon - Fri 8:00 AM - 4:30 PM  Fees: Free   Phone: (941) 712-8911 Email: hra@[facility] Website: https://www.[facility]/hra/[place]-housing-and-tbnuvevoagagk-tvyfbkvdy-qvk     8  [facility] and Disability Services Distance: 9.98 miles      In-Person   [address] [place] [address]  Language: English  Hours: Mon - Fri 8:00 AM - 4:00 PM  Fees: Free, Insurance, Sliding Fee   Phone: (943) 943-5296 Email: ledy@[facility]. Website: https://www.[facility]./HealthFamily/Disabilities     Shelter for families  9  [facility] Distance: 5.88 miles      In-Person   [address] [place] [address]  Language: English  Hours: Mon - Sun Open 24 Hours  Fees: Free, Sliding Fee   Phone: (774) 154-8909 Ext.1 Email: info@[facility].org Website: http://www.[facility].org     Shelter for individuals  10  [employer] [facility] ([facility]) [facility], [facility] Distance: 1.97 miles      In-Person   [address] [place] [address]  Language: English, Lao  Hours: Mon - Fri 8:00 AM - 4:30 PM  Fees: Free   Phone: (227) 916-7240 Email: info@[facility].org Website: http://www.capagency.org     11  [facility] [place] [facility] [place] [facility] Distance: 14 miles      In-Person   [address] [place] [address]  Language: English  Hours: Mon - Sun 5:00 PM - 10:00 AM  Fees: Free, Self Pay   Phone: (439) 872-3316 Email:  info@[employer].org Website: https://www.[employer].org/locations/[facility]-[place]/          Important Numbers & Websites       Emergency Services   911  City Services   311  Poison Control   (274) 691-1357  Suicide Prevention Lifeline   (935) 903-9298 (TALK)  Child Abuse Hotline   (919) 940-7750 (4-A-Child)  Sexual Assault Hotline   (555) 435-4346 (HOPE)  National Runaway Safeline   (265) 917-7634 (RUNAWAY)  All-Options Talkline   (126) 541-5483  Substance Abuse Referral   (727) 192-1185 (HELP)

## 2023-12-22 ENCOUNTER — VIRTUAL VISIT (OUTPATIENT)
Dept: FAMILY MEDICINE | Facility: CLINIC | Age: 43
End: 2023-12-22
Attending: FAMILY MEDICINE
Payer: COMMERCIAL

## 2023-12-22 ENCOUNTER — MYC MEDICAL ADVICE (OUTPATIENT)
Dept: FAMILY MEDICINE | Facility: CLINIC | Age: 43
End: 2023-12-22

## 2023-12-22 DIAGNOSIS — F90.2 ADHD (ATTENTION DEFICIT HYPERACTIVITY DISORDER), COMBINED TYPE: Primary | ICD-10-CM

## 2023-12-22 PROCEDURE — 99213 OFFICE O/P EST LOW 20 MIN: CPT | Mod: VID | Performed by: FAMILY MEDICINE

## 2023-12-22 RX ORDER — DEXTROAMPHETAMINE SACCHARATE, AMPHETAMINE ASPARTATE MONOHYDRATE, DEXTROAMPHETAMINE SULFATE AND AMPHETAMINE SULFATE 5; 5; 5; 5 MG/1; MG/1; MG/1; MG/1
20 CAPSULE, EXTENDED RELEASE ORAL DAILY
Qty: 30 CAPSULE | Refills: 0 | Status: SHIPPED | OUTPATIENT
Start: 2024-02-22 | End: 2023-12-26

## 2023-12-22 RX ORDER — DEXTROAMPHETAMINE SACCHARATE, AMPHETAMINE ASPARTATE MONOHYDRATE, DEXTROAMPHETAMINE SULFATE AND AMPHETAMINE SULFATE 5; 5; 5; 5 MG/1; MG/1; MG/1; MG/1
20 CAPSULE, EXTENDED RELEASE ORAL DAILY
Qty: 30 CAPSULE | Refills: 0 | Status: SHIPPED | OUTPATIENT
Start: 2024-01-22 | End: 2023-12-26

## 2023-12-22 RX ORDER — DEXTROAMPHETAMINE SACCHARATE, AMPHETAMINE ASPARTATE MONOHYDRATE, DEXTROAMPHETAMINE SULFATE AND AMPHETAMINE SULFATE 5; 5; 5; 5 MG/1; MG/1; MG/1; MG/1
20 CAPSULE, EXTENDED RELEASE ORAL DAILY
Qty: 30 CAPSULE | Refills: 0 | Status: SHIPPED | OUTPATIENT
Start: 2023-12-22 | End: 2023-12-26

## 2023-12-22 ASSESSMENT — ENCOUNTER SYMPTOMS
ABDOMINAL PAIN: 0
NERVOUS/ANXIOUS: 0
SLEEP DISTURBANCE: 0
CONSTITUTIONAL NEGATIVE: 1
PALPITATIONS: 0
HEADACHES: 0

## 2023-12-22 NOTE — PROGRESS NOTES
Andrew is a 43 year old who is being evaluated via a billable video visit.      How would you like to obtain your AVS? MyChart  If the video visit is dropped, the invitation should be resent by: Text to cell phone: [phone]  Will anyone else be joining your video visit? No        Assessment and Plan    (F90.2) ADHD (attention deficit hyperactivity disorder), combined type  (primary encounter diagnosis)  Comment: 3m refills provided.  May call for another 3m cycle, OV in 6m   Plan: amphetamine-dextroamphetamine (ADDERALL XR) 20         MG 24 hr capsule, amphetamine-dextroamphetamine        (ADDERALL XR) 20 MG 24 hr capsule,         amphetamine-dextroamphetamine (ADDERALL XR) 20         MG 24 hr capsule              RTC in 6m video    Sabas Cerna MD      Subjective   Andrew is a 43 year old, presenting for the following health issues:  Recheck Medication        12/22/2023     3:11 PM   Additional Questions   Roomed by MR   Accompanied by NA         12/22/2023     3:11 PM   Patient Reported Additional Medications   Patient reports taking the following new medications NA       History of Present Illness       Reason for visit:  Follow up on medication    He eats 2-3 servings of fruits and vegetables daily.He consumes 2 sweetened beverage(s) daily.He exercises with enough effort to increase his heart rate 20 to 29 minutes per day.  He exercises with enough effort to increase his heart rate 3 or less days per week.   He is taking medications regularly.       Depression and Anxiety Follow-Up  How are you doing with your depression since your last visit? Worsened - feeling more down, family issue  How are you doing with your anxiety since your last visit?  Worsened   Are you having other symptoms that might be associated with depression or anxiety? No  Have you had a significant life event? OTHER: family issues    Do you have any concerns with your use of alcohol or other drugs? No    Social History     Tobacco Use     Smoking status: Former     Packs/day: 0.10     Years: 10.00     Additional pack years: 0.00     Total pack years: 1.00     Types: Cigarettes     Start date: 1999     Quit date: 2014     Years since quittin.3    Smokeless tobacco: Never    Tobacco comments:     3 cigs day 13 years   Vaping Use    Vaping Use: Never used   Substance Use Topics    Alcohol use: Yes     Comment: Extremely rarely (major occassasions) bad affects on stomach    Drug use: No         2023     7:44 PM 2023    10:59 AM 2023     2:55 PM   PHQ   PHQ-9 Total Score 6 5 5   Q9: Thoughts of better off dead/self-harm past 2 weeks Not at all Not at all Not at all         3/10/2023     3:34 PM 2023     4:11 PM 2023     9:20 AM   WILMAR-7 SCORE   Total Score 6 (mild anxiety)  2 (minimal anxiety)   Total Score 6 6 2         2023     3:33 PM   Last PHQ-9   1.  Little interest or pleasure in doing things 2   2.  Feeling down, depressed, or hopeless 2   3.  Trouble falling or staying asleep, or sleeping too much 1   4.  Feeling tired or having little energy 1   5.  Poor appetite or overeating 1   6.  Feeling bad about yourself 2   7.  Trouble concentrating 2   8.  Moving slowly or restless 0   Q9: Thoughts of better off dead/self-harm past 2 weeks 0   PHQ-9 Total Score 11         2023     3:34 PM   WILMAR-7    1. Feeling nervous, anxious, or on edge 2   2. Not being able to stop or control worrying 1   3. Worrying too much about different things 2   4. Trouble relaxing 1   5. Being so restless that it is hard to sit still 0   6. Becoming easily annoyed or irritable 1   7. Feeling afraid, as if something awful might happen 2   WILMAR-7 Total Score 9    9   If you checked any problems, how difficult have they made it for you to do your work, take care of things at home, or get along with other people? Somewhat difficult       Recently had to cut ties with his mother back in [place] (she took out a lot of credit cards in  his name and ran up a lot charges) and is feeling a bit disconnected from that angle of his life.  Had been his closest family connection back home.  Has been looking to get started with counseling.    Suicide Assessment Five-step Evaluation and Treatment (SAFE-T)        Medication Followup of Adderall 20 mg  Taking Medication as prescribed: yes  Side Effects:  None  Medication Helping Symptoms:  yes - notes that work is going much better    15mg for weeks, then 20mg for two weeks.  20mg works well for the work day and he didn't feel overmedicated or jittery.  On the whole was happy with that. Would like to stick with the 20mg for now.      Review of Systems   Constitutional: Negative.    Cardiovascular:  Negative for palpitations.   Gastrointestinal:  Negative for abdominal pain.   Neurological:  Negative for headaches.   Psychiatric/Behavioral:  Negative for sleep disturbance. The patient is not nervous/anxious.             Objective           Vitals:  No vitals were obtained today due to virtual visit.    Physical Exam   GENERAL: Healthy, alert and no distress  EYES: Eyes grossly normal to inspection.  No discharge or erythema, or obvious scleral/conjunctival abnormalities.  RESP: No audible wheeze, cough, or visible cyanosis.  No visible retractions or increased work of breathing.    SKIN: Visible skin clear. No significant rash, abnormal pigmentation or lesions.  NEURO: Cranial nerves grossly intact.  Mentation and speech appropriate for age.  PSYCH: Mentation appears normal, affect normal/bright, judgement and insight intact, normal speech and appearance well-groomed.          Video-Visit Details    Type of service:  Video Visit     Originating Location (pt. Location): Home    Distant Location (provider location):  On-site  Platform used for Video Visit: Taking Point

## 2023-12-23 ENCOUNTER — E-VISIT (OUTPATIENT)
Dept: FAMILY MEDICINE | Facility: CLINIC | Age: 43
End: 2023-12-23
Payer: COMMERCIAL

## 2023-12-23 DIAGNOSIS — F90.2 ADHD (ATTENTION DEFICIT HYPERACTIVITY DISORDER), COMBINED TYPE: Primary | ICD-10-CM

## 2023-12-23 PROCEDURE — 99207 PR NO CHARGE LOS: CPT | Performed by: FAMILY MEDICINE

## 2023-12-26 ENCOUNTER — MYC REFILL (OUTPATIENT)
Dept: RHEUMATOLOGY | Facility: CLINIC | Age: 43
End: 2023-12-26

## 2023-12-26 DIAGNOSIS — M35.2 BEHCET'S DISEASE (H): ICD-10-CM

## 2023-12-26 RX ORDER — DEXTROAMPHETAMINE SACCHARATE, AMPHETAMINE ASPARTATE, DEXTROAMPHETAMINE SULFATE AND AMPHETAMINE SULFATE 5; 5; 5; 5 MG/1; MG/1; MG/1; MG/1
20 TABLET ORAL 2 TIMES DAILY
Qty: 60 TABLET | Refills: 0 | Status: SHIPPED | OUTPATIENT
Start: 2024-01-26 | End: 2024-02-25

## 2023-12-26 RX ORDER — DEXTROAMPHETAMINE SACCHARATE, AMPHETAMINE ASPARTATE, DEXTROAMPHETAMINE SULFATE AND AMPHETAMINE SULFATE 5; 5; 5; 5 MG/1; MG/1; MG/1; MG/1
20 TABLET ORAL 2 TIMES DAILY
Qty: 60 TABLET | Refills: 0 | Status: SHIPPED | OUTPATIENT
Start: 2023-12-26 | End: 2024-01-25

## 2023-12-26 RX ORDER — DEXTROAMPHETAMINE SACCHARATE, AMPHETAMINE ASPARTATE, DEXTROAMPHETAMINE SULFATE AND AMPHETAMINE SULFATE 5; 5; 5; 5 MG/1; MG/1; MG/1; MG/1
20 TABLET ORAL 2 TIMES DAILY
Qty: 60 TABLET | Refills: 0 | Status: SHIPPED | OUTPATIENT
Start: 2024-02-26 | End: 2024-03-27

## 2023-12-26 RX ORDER — COLCHICINE 0.6 MG/1
0.6 CAPSULE ORAL 2 TIMES DAILY
Qty: 60 CAPSULE | Refills: 1 | Status: SHIPPED | OUTPATIENT
Start: 2023-12-26 | End: 2024-03-15

## 2023-12-26 NOTE — TELEPHONE ENCOUNTER
adalimumab (HUMIRA *CF*) 40 MG/0.4ML pen kit   Last Written Prescription Date:  7/14/2023  Last Fill Quantity: 1,   # refills: 5  Last Office Visit : 7/14/2023  Future Office visit:  None  Routing refill request to provider for review/approval because:  Refer to Provider for review and refills for Pt care.     MITIGARE 0.6 MG capsule   Last Written Prescription Date:  7/18/2023  Last Fill Quantity: 60   # refills: 5  Last Office Visit : 7/14/2023  Future Office visit:  None  Routing refill request to provider for review/approval because:  Needing updated Uric Acid Level on file  Refer to Provider for review and refills for Pt care.     Ame Christianson RN  Central Triage Red Flags/Med Refills

## 2024-01-10 ENCOUNTER — TELEPHONE (OUTPATIENT)
Dept: RHEUMATOLOGY | Facility: CLINIC | Age: 44
End: 2024-01-10
Payer: COMMERCIAL

## 2024-01-11 NOTE — TELEPHONE ENCOUNTER
PA Needed    Medication: HUMIRA  QTY/DS: 2 per 28ds  NEW INS: Yes  Insurance Company: [facility] [employer]   Pharmacy Filling the Rx: [facility]  PA : N/A  Date of last fill: 2023

## 2024-01-11 NOTE — TELEPHONE ENCOUNTER
PA Initiation    Medication: HUMIRA *CF* 40 MG/0.4ML SC PSKT  Insurance Company: [employer] [place] - Phone [phone] Fax [phone]  Pharmacy Filling the Rx: [facility] - [place], [place] - [address]  Filling Pharmacy Phone:    Filling Pharmacy Fax:    Start Date: 1/11/2024      : [id number])    BRENNA Bañuelos, [facility]  Specialty Pharmacy Clinic Liaison     [facility]    [email or web address]     Phone: [phone]  Fax: [phone]

## 2024-01-12 NOTE — TELEPHONE ENCOUNTER
Faxed TB labs from 2022      To plan  BRENNA Bañuelos, [facility]  Specialty Pharmacy Clinic Liaison     [facility]    [email or web address]     Phone: [phone]  Fax: [phone]

## 2024-01-15 NOTE — TELEPHONE ENCOUNTER
Pt's wife called, insurance for [employer] is currently updated in pt's chart. Requesting a my chart message once PA is approved.

## 2024-01-16 NOTE — TELEPHONE ENCOUNTER
Has The Growth Been Previously Biopsied?: has been previously biopsied PA still pending review with TB labs sent    BRENNA Bañuelos, [facility]  Specialty Pharmacy Clinic Liaison     [facility]    [email or web address]     Phone: [phone]  Fax: [phone]

## 2024-01-18 NOTE — TELEPHONE ENCOUNTER
Prior Authorization Approval    Medication: HUMIRA *CF* 40 MG/0.4ML SC PSKT  Authorization Effective Date: 1/18/2024  Authorization Expiration Date: 1/17/2025  Approved Dose/Quantity: 2 each  Reference #: : [id number]   Insurance Company: [employer] [place] - Phone [phone] Fax [phone]  Expected CoPay: $    CoPay Card Available: No    Financial Assistance Needed: na  Which Pharmacy is filling the prescription: [facility] - [place], [place] - [address]

## 2024-02-01 ENCOUNTER — VIRTUAL VISIT (OUTPATIENT)
Dept: FAMILY MEDICINE | Facility: CLINIC | Age: 44
End: 2024-02-01
Payer: COMMERCIAL

## 2024-02-01 DIAGNOSIS — F41.1 GENERALIZED ANXIETY DISORDER: ICD-10-CM

## 2024-02-01 DIAGNOSIS — F33.1 MODERATE EPISODE OF RECURRENT MAJOR DEPRESSIVE DISORDER (H): ICD-10-CM

## 2024-02-01 PROCEDURE — 99214 OFFICE O/P EST MOD 30 MIN: CPT | Mod: 95 | Performed by: GENERAL PRACTICE

## 2024-02-01 RX ORDER — SERTRALINE HYDROCHLORIDE 25 MG/1
25 TABLET, FILM COATED ORAL DAILY
Qty: 30 TABLET | Refills: 3 | Status: SHIPPED | OUTPATIENT
Start: 2024-02-01 | End: 2024-05-15

## 2024-02-01 ASSESSMENT — ANXIETY QUESTIONNAIRES
GAD7 TOTAL SCORE: 6
IF YOU CHECKED OFF ANY PROBLEMS ON THIS QUESTIONNAIRE, HOW DIFFICULT HAVE THESE PROBLEMS MADE IT FOR YOU TO DO YOUR WORK, TAKE CARE OF THINGS AT HOME, OR GET ALONG WITH OTHER PEOPLE: SOMEWHAT DIFFICULT
6. BECOMING EASILY ANNOYED OR IRRITABLE: SEVERAL DAYS
2. NOT BEING ABLE TO STOP OR CONTROL WORRYING: SEVERAL DAYS
7. FEELING AFRAID AS IF SOMETHING AWFUL MIGHT HAPPEN: NOT AT ALL
5. BEING SO RESTLESS THAT IT IS HARD TO SIT STILL: NOT AT ALL
GAD7 TOTAL SCORE: 6
3. WORRYING TOO MUCH ABOUT DIFFERENT THINGS: MORE THAN HALF THE DAYS
1. FEELING NERVOUS, ANXIOUS, OR ON EDGE: SEVERAL DAYS

## 2024-02-01 ASSESSMENT — PATIENT HEALTH QUESTIONNAIRE - PHQ9
SUM OF ALL RESPONSES TO PHQ QUESTIONS 1-9: 6
5. POOR APPETITE OR OVEREATING: SEVERAL DAYS

## 2024-02-01 NOTE — PROGRESS NOTES
Andrew is a 43 year old who is being evaluated via a billable video visit.      How would you like to obtain your AVS? MyChart  If the video visit is dropped, the invitation should be resent by: Text to cell phone: [phone]  Will anyone else be joining your video visit? No          Assessment & Plan     Generalized anxiety disorder    - sertraline (ZOLOFT) 25 MG tablet; Take 1 tablet (25 mg) by mouth daily    Moderate episode of recurrent major depressive disorder (H)  Feel like going downhill, slightly  Wants to increase sertraline  Follow-up with PCP in 4 weeks after starting the 75 mg.  - sertraline (ZOLOFT) 25 MG tablet; Take 1 tablet (25 mg) by mouth daily  - sertraline (ZOLOFT) 50 MG tablet; Take 1 tablet (50 mg) by mouth daily                  Subjective   Andrew is a 43 year old, presenting for the following health issues:  Recheck Medication and MH Follow Up        2024     3:18 PM   Additional Questions   Roomed by Leigha ORTIZ Ma   Accompanied by self         2024     3:18 PM   Patient Reported Additional Medications   Patient reports taking the following new medications none       Follow-up mental health  Feel like slightly going down hill  Continuing with therapy  PHQ-9 and WILMAR-7 were improved compare to 1 month ago but patient does not feel better.            Depression and Anxiety Follow-Up  How are you doing with your depression since your last visit? No change  How are you doing with your anxiety since your last visit?  No change  Are you having other symptoms that might be associated with depression or anxiety? No  Have you had a significant life event? No   Do you have any concerns with your use of alcohol or other drugs? No    Social History     Tobacco Use    Smoking status: Former     Packs/day: 0.10     Years: 10.00     Additional pack years: 0.00     Total pack years: 1.00     Types: Cigarettes     Start date: 1999     Quit date: 2014     Years since quittin.4    Smokeless  tobacco: Never    Tobacco comments:     3 cigs day 13 years   Vaping Use    Vaping Use: Never used   Substance Use Topics    Alcohol use: Yes     Comment: Extremely rarely (major occassasions) bad affects on stomach    Drug use: No         12/22/2023     3:33 PM 1/30/2024     2:10 PM 2/1/2024     3:19 PM   PHQ   PHQ-9 Total Score 11 10 6   Q9: Thoughts of better off dead/self-harm past 2 weeks Not at all Not at all Not at all         12/22/2023     3:34 PM 1/30/2024     2:13 PM 2/1/2024     3:19 PM   WILMAR-7 SCORE   Total Score 9 (mild anxiety) 10 (moderate anxiety)    Total Score 9    9 10 6         2/1/2024     3:19 PM   Last PHQ-9   1.  Little interest or pleasure in doing things 2   2.  Feeling down, depressed, or hopeless 1   3.  Trouble falling or staying asleep, or sleeping too much 0   4.  Feeling tired or having little energy 1   5.  Poor appetite or overeating 1   6.  Feeling bad about yourself 1   7.  Trouble concentrating 0   8.  Moving slowly or restless 0   Q9: Thoughts of better off dead/self-harm past 2 weeks 0   PHQ-9 Total Score 6   Difficulty at work, home, or with people Somewhat difficult         2/1/2024     3:19 PM   WILMAR-7    1. Feeling nervous, anxious, or on edge 1   2. Not being able to stop or control worrying 1   3. Worrying too much about different things 2   4. Trouble relaxing 1   5. Being so restless that it is hard to sit still 0   6. Becoming easily annoyed or irritable 1   7. Feeling afraid, as if something awful might happen 0   WILMAR-7 Total Score 6   If you checked any problems, how difficult have they made it for you to do your work, take care of things at home, or get along with other people? Somewhat difficult     Suicide Assessment Five-step Evaluation and Treatment (SAFE-T)            Review of Systems  Constitutional, HEENT, cardiovascular, pulmonary, gi and gu systems are negative, except as otherwise noted.      Objective       Vitals:  No vitals were obtained today due to  virtual visit.    Physical Exam   GENERAL: alert and no distress  EYES: Eyes grossly normal to inspection.  No discharge or erythema, or obvious scleral/conjunctival abnormalities.  RESP: No audible wheeze, cough, or visible cyanosis.    SKIN: Visible skin clear. No significant rash, abnormal pigmentation or lesions.  NEURO: Cranial nerves grossly intact.  Mentation and speech appropriate for age.  PSYCH: Appropriate affect, tone, and pace of words          Video-Visit Details    Type of service:  Video Visit     Originating Location (pt. Location): Home    Distant Location (provider location):  On-site  Platform used for Video Visit: Other: Haiku  Signed Electronically by: Taniya Aragon MD

## 2024-02-18 DIAGNOSIS — M35.2 BEHCET'S DISEASE (H): ICD-10-CM

## 2024-02-22 RX ORDER — ADALIMUMAB 40MG/0.4ML
40 KIT SUBCUTANEOUS
Qty: 2 EACH | Refills: 2 | Status: SHIPPED | OUTPATIENT
Start: 2024-02-22 | End: 2024-03-15

## 2024-02-22 NOTE — TELEPHONE ENCOUNTER
adalimumab (HUMIRA *CF*) 40 MG/0.4ML pen kit 1 each 1 12/26/2023  Last Office Visit :7/14/2023  [facility] [place]  Jimmy Vann MD     Future Office visit:  0    Refill decision: #2 each + 2rf to [facility] - [place], [place] - [address] -[phone]

## 2024-02-23 ENCOUNTER — MYC MEDICAL ADVICE (OUTPATIENT)
Dept: FAMILY MEDICINE | Facility: CLINIC | Age: 44
End: 2024-02-23
Payer: COMMERCIAL

## 2024-02-23 ENCOUNTER — TELEPHONE (OUTPATIENT)
Dept: RHEUMATOLOGY | Facility: CLINIC | Age: 44
End: 2024-02-23
Payer: COMMERCIAL

## 2024-02-23 NOTE — TELEPHONE ENCOUNTER
[facility]    Fax: [phone]    Spec: [phone]    MO: [phone]

## 2024-03-07 NOTE — TELEPHONE ENCOUNTER
Retail Pharmacy Prior Authorization Team   Phone: [phone]    PA Initiation    Medication: MITIGARE 0.6 MG PO CAPS  Insurance Company: [facility] - Phone [phone] Fax [phone]  Pharmacy Filling the Rx: [facility] - [place], [place] - [address]  Filling Pharmacy Phone:    Filling Pharmacy Fax:    Start Date: 3/7/2024

## 2024-03-10 NOTE — TELEPHONE ENCOUNTER
PRIOR AUTHORIZATION DENIED    Medication: MITIGARE 0.6 MG PO CAPS  Insurance Company: [employer] [place] - Phone [phone] Fax [phone]  Denial Date: 3/7/2024  Denial Reason(s): Excluded      Appeal Information:

## 2024-03-12 DIAGNOSIS — F90.2 ADHD (ATTENTION DEFICIT HYPERACTIVITY DISORDER), COMBINED TYPE: ICD-10-CM

## 2024-03-14 ENCOUNTER — MYC MEDICAL ADVICE (OUTPATIENT)
Dept: FAMILY MEDICINE | Facility: CLINIC | Age: 44
End: 2024-03-14
Payer: COMMERCIAL

## 2024-03-14 RX ORDER — DEXTROAMPHETAMINE SACCHARATE, AMPHETAMINE ASPARTATE, DEXTROAMPHETAMINE SULFATE AND AMPHETAMINE SULFATE 5; 5; 5; 5 MG/1; MG/1; MG/1; MG/1
20 TABLET ORAL 2 TIMES DAILY
Qty: 60 TABLET | Refills: 0 | Status: SHIPPED | OUTPATIENT
Start: 2024-04-14 | End: 2024-05-14

## 2024-03-14 RX ORDER — DEXTROAMPHETAMINE SACCHARATE, AMPHETAMINE ASPARTATE, DEXTROAMPHETAMINE SULFATE AND AMPHETAMINE SULFATE 5; 5; 5; 5 MG/1; MG/1; MG/1; MG/1
20 TABLET ORAL 2 TIMES DAILY
Qty: 60 TABLET | Refills: 0 | OUTPATIENT
Start: 2024-03-14

## 2024-03-14 RX ORDER — DEXTROAMPHETAMINE SACCHARATE, AMPHETAMINE ASPARTATE, DEXTROAMPHETAMINE SULFATE AND AMPHETAMINE SULFATE 5; 5; 5; 5 MG/1; MG/1; MG/1; MG/1
20 TABLET ORAL 2 TIMES DAILY
Qty: 60 TABLET | Refills: 0 | Status: SHIPPED | OUTPATIENT
Start: 2024-05-15 | End: 2024-06-14

## 2024-03-14 RX ORDER — DEXTROAMPHETAMINE SACCHARATE, AMPHETAMINE ASPARTATE, DEXTROAMPHETAMINE SULFATE AND AMPHETAMINE SULFATE 5; 5; 5; 5 MG/1; MG/1; MG/1; MG/1
20 TABLET ORAL 2 TIMES DAILY
Qty: 60 TABLET | Refills: 0 | Status: SHIPPED | OUTPATIENT
Start: 2024-03-14 | End: 2024-04-13

## 2024-03-15 ENCOUNTER — VIRTUAL VISIT (OUTPATIENT)
Dept: RHEUMATOLOGY | Facility: CLINIC | Age: 44
End: 2024-03-15
Attending: INTERNAL MEDICINE
Payer: COMMERCIAL

## 2024-03-15 ENCOUNTER — OFFICE VISIT (OUTPATIENT)
Dept: DERMATOLOGY | Facility: CLINIC | Age: 44
End: 2024-03-15
Payer: COMMERCIAL

## 2024-03-15 DIAGNOSIS — M35.2 BEHCET'S DISEASE (H): Primary | ICD-10-CM

## 2024-03-15 DIAGNOSIS — F90.2 ADHD (ATTENTION DEFICIT HYPERACTIVITY DISORDER), COMBINED TYPE: ICD-10-CM

## 2024-03-15 DIAGNOSIS — L60.3 DYSTROPHIC NAIL: Primary | ICD-10-CM

## 2024-03-15 DIAGNOSIS — F33.1 MODERATE EPISODE OF RECURRENT MAJOR DEPRESSIVE DISORDER (H): ICD-10-CM

## 2024-03-15 DIAGNOSIS — G47.00 INSOMNIA, UNSPECIFIED TYPE: ICD-10-CM

## 2024-03-15 PROCEDURE — 99202 OFFICE O/P NEW SF 15 MIN: CPT | Performed by: PHYSICIAN ASSISTANT

## 2024-03-15 RX ORDER — COLCHICINE 0.6 MG/1
0.6 TABLET ORAL 2 TIMES DAILY
Qty: 180 TABLET | Refills: 3 | Status: SHIPPED | OUTPATIENT
Start: 2024-03-15

## 2024-03-15 RX ORDER — ADALIMUMAB 40MG/0.4ML
40 KIT SUBCUTANEOUS
Qty: 2.4 ML | Refills: 1 | Status: SHIPPED | OUTPATIENT
Start: 2024-03-15

## 2024-03-15 NOTE — PROGRESS NOTES
HPI:   Chief complaints: Serjio Farris is a pleasant 43 year old male who presents for evaluation of an injured nail. 10 months ago he sustained a laceration along the left 4th distal finger close to the proximal nail fold. The laceration healed and he had two infections of the area after that. The skin around the nail is doing well now, however the nail has not returned to normal. He has been using aquaphor which has been helping.       PHYSICAL EXAM:    There were no vitals taken for this visit.  Skin exam performed as follows: Type 2 skin. Mood appropriate  Alert and Oriented X 3. Well developed, well nourished in no distress.  General appearance: Normal  Head including face: Normal  Eyes: conjunctiva and lids: Normal  Mouth: Lips, teeth, gums: Normal  Neck: Normal  Skin: Scalp and body hair: See below    Dystrophic nail of the left 4th fingernail without visible cuticle    ASSESSMENT/PLAN:     Nail matrix injury - discussed that unfortunately there is no good treatment for this. Continue aquaphor daily.           Follow-up: PRN  CC:   Scribed By: Ratna Caban, MS, PAPREETI

## 2024-03-15 NOTE — PROGRESS NOTES
Medication Therapy Management (MTM) Encounter    ASSESSMENT:                            Medication Adherence/Access: See below for considerations    Behcet's Disease: Patient tolerating and finding good efficacy to current therapy. Would benefit from continuing Humira 40 mg every 14 days and colchicine 0.6 mg twice daily. Will plan to send in insurance preferred colchicine 0.6 mg tablets to local pharmacy. Due for regular medication monitoring labs.    Depression/ADHD/Insomnia: Stable.    PLAN:                            1. Continue Humira 40 mg every 14 days.    2. I will send in a new prescription for the preferred colchicine 0.6 mg tablets. Please let me know if you have any trouble getting this filled.    3. Please make a lab appointment to complete regular labs for Humira monitoring.    Follow-up: Return in about 6 months (around 9/15/2024) for MTM Pharmacist Visit.    SUBJECTIVE/OBJECTIVE:                          Andrew Farris is a 43 year old male contacted via secure video for a follow-up visit.       Reason for visit: Colchicine questions with new insurance, Humira follow up    Allergies/ADRs: Reviewed in chart  Past Medical History: Reviewed in chart  Tobacco: He reports that he quit smoking about 9 years ago. His smoking use included cigarettes. He started smoking about 25 years ago. He has a 1 pack-year smoking history. He has never used smokeless tobacco.  Alcohol: not currently using    Medication Adherence/Access: Medication barriers: obtaining medication from insurance. Notes Mitigare is no longer covered on this insurance and was told he should be getting generic colchicine instead.    Behcet's Disease:   Humira 40 mg every 14 days  Mitigare 0.6 mg twice daily   Prednisone taper on hand    Reports he has been doing fairly well on his current medications. Has some mild flare ups a couple times a month but not significant enough to need prednisone. No notable side effects reported. Notes he feels much  better on Humira than he did on Otezla. Still has 1 month of Mitigare at home but will need to switch to insurance preferred colchicine to be able to continue therapy.    Liver Function Studies -   Recent Labs   Lab Test 07/28/23  1534   PROTTOTAL 6.8   ALBUMIN 4.5   BILITOTAL 0.3   ALKPHOS 62   AST 20   ALT 19     CBC RESULTS:   Recent Labs   Lab Test 07/28/23  1534   WBC 8.1   RBC 4.55   HGB 13.9   HCT 40.1   MCV 88   MCH 30.5   MCHC 34.7   RDW 12.0        Depression/ADHD/Insomnia:  Sertraline 75 mg daily  Adderall 20 mg twice daily   Trazodone 50 mg nightly    Reports he is doing well on these medications. No side effects or concerns noted.    Today's Vitals: There were no vitals taken for this visit.  ----------------    I spent 18 minutes with this patient today. All changes were made via collaborative practice agreement with Gordo Wright MD. A copy of the visit note was provided to the patient's provider(s).    A summary of these recommendations was sent via [employer].    Fidelia Álvarez, PharmD  Medication Therapy Management Pharmacist  [facility]  Phone: [phone]    Telemedicine Visit Details  Type of service:  Video Conference via [employer]  Start Time: 2:30 PM  End Time: 2:48 PM     Medication Therapy Recommendations  Behcet's disease (H)    Current Medication: MITIGARE 0.6 MG capsule (Discontinued)   Rationale: Cannot afford medication product - Cost - Adherence   Recommendation: Change Medication Formulation  - colchicine 0.6 MG tablet - Twice daily   Status: Accepted per CPA   Note: Per new insurance formulary

## 2024-03-15 NOTE — Clinical Note
"3/15/2024       RE: Serjio Farris  [address] [address] [facility] [address]     Dear Colleague,    Thank you for referring your patient, Serjio Farris, to the [facility]. Please see a copy of my visit note below.    Medication Therapy Management (MTM) Encounter    ASSESSMENT:                            Medication Adherence/Access: {adherencechoices:304536}    ***:  ***      PLAN:                            ***    Follow-up: {followuptest2:797084}    SUBJECTIVE/OBJECTIVE:                          Andrew Farris is a 43 year old male contacted via secure video for a follow-up visit.       Reason for visit: Colchicine questions with new insurance, Humira follow up    Allergies/ADRs: Reviewed in chart  Past Medical History: Reviewed in chart  Tobacco: He reports that he quit smoking about 9 years ago. His smoking use included cigarettes. He started smoking about 25 years ago. He has a 1 pack-year smoking history. He has never used smokeless tobacco.  Alcohol: not currently using    Medication Adherence/Access: {[facility]:709621}    Behcet's Disease:   ***    Depression/ADHD/Insomnia:    Today's Vitals: There were no vitals taken for this visit.  ----------------  {JAMILA?:363717}    I spent {mtm total time 3:232963} with this patient today. { :275750}. A copy of the visit note was provided to the patient's provider(s).    A summary of these recommendations {GIVEN/NOT GIVEN:514628}.    ***    Telemedicine Visit Details  Type of service:  {telemedvisitmtm:381224::\"Telephone visit\"}  Start Time: {video/phone visit start time:152948}  End Time: {video/phone visit end time:152948}     Medication Therapy Recommendations  No medication therapy recommendations to display       Medication Therapy Management (MTM) Encounter    ASSESSMENT:                            Medication Adherence/Access: See below for " considerations    Behcet's Disease: Patient tolerating and finding good efficacy to current therapy. Would benefit from continuing Humira 40 mg every 14 days and colchicine 0.6 mg twice daily. Will plan to send in insurance preferred colchicine 0.6 mg tablets to local pharmacy. Due for regular medication monitoring labs.    Depression/ADHD/Insomnia: Stable.    PLAN:                            1. Continue Humira 40 mg every 14 days.    2. I will send in a new prescription for the preferred colchicine 0.6 mg tablets. Please let me know if you have any trouble getting this filled.    3. Please make a lab appointment to complete regular labs for Humira monitoring.    Follow-up: {followuptest2:446784}    SUBJECTIVE/OBJECTIVE:                          Andrew Farris is a 43 year old male contacted via secure video for a follow-up visit.       Reason for visit: Colchicine questions with new insurance, Humira follow up    Allergies/ADRs: Reviewed in chart  Past Medical History: Reviewed in chart  Tobacco: He reports that he quit smoking about 9 years ago. His smoking use included cigarettes. He started smoking about 25 years ago. He has a 1 pack-year smoking history. He has never used smokeless tobacco.  Alcohol: not currently using    Medication Adherence/Access: Medication barriers: obtaining medication from insurance. Notes Mitigare is no longer covered on this insurance and was told he should be getting generic colchicine instead.    Behcet's Disease:   Humira 40 mg every 14 days  Mitigare 0.6 mg twice daily   Prednisone taper on hand    Reports he has been doing fairly well on his current medications. Has some mild flare ups a couple times a month but not significant enough to need prednisone. No notable side effects reported. Notes he feels much better on Humira than he did on Otezla. Still has 1 month of Mitigare at home but will need to switch to insurance preferred colchicine to be able to continue therapy.    Liver  Function Studies -   Recent Labs   Lab Test 07/28/23  1534   PROTTOTAL 6.8   ALBUMIN 4.5   BILITOTAL 0.3   ALKPHOS 62   AST 20   ALT 19     CBC RESULTS:   Recent Labs   Lab Test 07/28/23  1534   WBC 8.1   RBC 4.55   HGB 13.9   HCT 40.1   MCV 88   MCH 30.5   MCHC 34.7   RDW 12.0        Depression/ADHD/Insomnia:  Sertraline 75 mg daily  Adderall 20 mg twice daily   Trazodone 50 mg nightly    Reports he is doing well on these medications. No side effects or concerns noted.    Today's Vitals: There were no vitals taken for this visit.  ----------------    I spent 18 minutes with this patient today. All changes were made via collaborative practice agreement with Gordo Wright MD. A copy of the visit note was provided to the patient's provider(s).    A summary of these recommendations was sent via [employer].    Damaris CurtisD  Medication Therapy Management Pharmacist  [facility]  Phone: [phone]    Telemedicine Visit Details  Type of service:  Video Conference via [employer]  Start Time: 2:30 PM  End Time: 2:48 PM     Medication Therapy Recommendations  Behcet's disease (H)    Current Medication: MITIGARE 0.6 MG capsule (Discontinued)   Rationale: Cannot afford medication product - Cost - Adherence   Recommendation: Change Medication Formulation  - colchicine 0.6 MG tablet - Twice daily   Status: Accepted per CPA   Note: Per new insurance formulary                  Again, thank you for allowing me to participate in the care of your patient.      Sincerely,    DAVID MC RPH

## 2024-03-15 NOTE — PATIENT INSTRUCTIONS
Patient Education       Proper skin care from [facility]:    -Eliminate harsh soaps as they strip the natural oils from the skin, often resulting in dry itchy skin ( i.e. Dial, Zest, Vietnamese Spring)  -Use mild soaps such as Cetaphil or Dove Sensitive Skin in the shower. You do not need to use soap on arms, legs, and trunk every time you shower unless visibly soiled.   -Avoid hot or cold showers.  -After showering, lightly dry off and apply moisturizing within 2-3 minutes. This will help trap moisture in the skin.   -Aggressive use of a moisturizer at least 1-2 times a day to the entire body (including -Vanicream, Cetaphil, Aquaphor or Cerave) and moisturize hands after every washing.  -We recommend using moisturizers that come in a tub that needs to be scooped out, not a pump. This has more of an oil base. It will hold moisture in your skin much better than a water base moisturizer. The above recommended are non-pore clogging.      Wear a sunscreen with at least SPF 30 on your face, ears, neck and V of the chest daily. Wear sunscreen on other areas of the body if those areas are exposed to the sun throughout the day. Sunscreens can contain physical and/or chemical blockers. Physical blockers are less likely to clog pores, these include zinc oxide and titanium dioxide. Reapply every two hour and after swimming.     Sunscreen examples: https://www.ewg.org/sunscreen/    UV radiation  UVA radiation remains constant throughout the day and throughout the year. It is a longer wavelength than UVB and therefore penetrates deeper into the skin leading to immediate and delayed tanning, photoaging, and skin cancer. 70-80% of UVA and UVB radiation occurs between the hours of 10am-2pm.  UVB radiation  UVB radiation causes the most harmful effects and is more significant during the summer months. However, snow and ice can reflect UVB radiation leading to skin damage during the winter months as well. UVB radiation is  responsible for tanning, burning, inflammation, delayed erythema (pinkness), pigmentation (brown spots), and skin cancer.     I recommend self monthly full body exams and yearly full body exams with a dermatology provider. If you develop a new or changing lesion please follow up for examination. Most skin cancers are pink and scaly or pink and pearly. However, we do see blue/brown/black skin cancers.  Consider the ABCDEs of melanoma when giving yourself your monthly full body exam ( don't forget the groin, buttocks, feet, toes, etc). A-asymmetry, B-borders, C-color, D-diameter, E-elevation or evolving. If you see any of these changes please follow up in clinic. If you cannot see your back I recommend purchasing a hand held mirror to use with a larger wall mirror.       Checking for Skin Cancer  You can find cancer early by checking your skin each month. There are 3 kinds of skin cancer. They are melanoma, basal cell carcinoma, and squamous cell carcinoma. Doing monthly skin checks is the best way to find new marks or skin changes. Follow the instructions below for checking your skin.   The ABCDEs of checking moles for melanoma   Check your moles or growths for signs of melanoma using ABCDE:   Asymmetry: the sides of the mole or growth don t match  Border: the edges are ragged, notched, or blurred  Color: the color within the mole or growth varies  Diameter: the mole or growth is larger than 6 mm (size of a pencil eraser)  Evolving: the size, shape, or color of the mole or growth is changing (evolving is not shown in the images below)    Checking for other types of skin cancer  Basal cell carcinoma or squamous cell carcinoma have symptoms such as:     A spot or mole that looks different from all other marks on your skin  Changes in how an area feels, such as itching, tenderness, or pain  Changes in the skin's surface, such as oozing, bleeding, or scaliness  A sore that does not heal  New swelling or redness beyond  the border of a mole    Who s at risk?  Anyone can get skin cancer. But you are at greater risk if you have:   Fair skin, light-colored hair, or light-colored eyes  Many moles or abnormal moles on your skin  A history of sunburns from sunlight or tanning beds  A family history of skin cancer  A history of exposure to radiation or chemicals  A weakened immune system  If you have had skin cancer in the past, you are at risk for recurring skin cancer.   How to check your skin  Do your monthly skin checkups in front of a full-length mirror. Check all parts of your body, including your:   Head (ears, face, neck, and scalp)  Torso (front, back, and sides)  Arms (tops, undersides, upper, and lower armpits)  Hands (palms, backs, and fingers, including under the nails)  Buttocks and genitals  Legs (front, back, and sides)  Feet (tops, soles, toes, including under the nails, and between toes)  If you have a lot of moles, take digital photos of them each month. Make sure to take photos both up close and from a distance. These can help you see if any moles change over time.   Most skin changes are not cancer. But if you see any changes in your skin, call your doctor right away. Only he or she can diagnose a problem. If you have skin cancer, seeing your doctor can be the first step toward getting the treatment that could save your life.   [employer] last reviewed this educational content on 4/1/2019 2000-2020 [employer]. [address] [place] [address], [place] [address]. All rights reserved. This information is not intended as a substitute for professional medical care. Always follow your healthcare professional's instructions.       When should I call my doctor?  If you are worsening or not improving, please, contact us or seek urgent care as noted below.     Who should I call with questions (adults)?  [facility] (adult and pediatric): [phone]  [facility]  [place] (adult): [phone]  [facility] ([employer], [place], [place] and [place]) [phone]  For urgent needs outside of business hours call the [facility] at [phone] and ask for the dermatology resident on call to be paged  If this is a medical emergency and you are unable to reach an ER, Call 911      If you need a prescription refill, please contact your pharmacy. Refills are approved or denied by our Physicians during normal business hours, Monday through Fridays  Per office policy, refills will not be granted if you have not been seen within the past year (or sooner depending on your child's condition)

## 2024-03-15 NOTE — PATIENT INSTRUCTIONS
"Recommendations from today's MTM visit:                                                       1. Continue Humira 40 mg every 14 days.    2. I will send in a new prescription for the preferred colchicine 0.6 mg tablets. Please let me know if you have any trouble getting this filled.    3. Please make a lab appointment to complete regular labs for Humira monitoring.    Follow-up: Return in about 6 months (around 9/15/2024) for MTM Pharmacist Visit.    It was great speaking with you today.  I value your experience and would be very thankful for your time in providing feedback in our clinic survey. In the next few days, you may receive an email or text message from [employer] with a link to a survey related to your  clinical pharmacist.\"     To schedule another MTM appointment, please call the clinic directly or you may call the MTM scheduling line at [phone].    My Clinical Pharmacist's contact information:                                                      Please feel free to contact me with any questions or concerns you have.      Fidelia Álvarez, PharmD  Medication Therapy Management Pharmacist  [facility]  Phone: [phone]     "

## 2024-03-15 NOTE — LETTER
3/15/2024         RE: Serjio Farris  [address]  [place] [address]        Dear Colleague,    Thank you for referring your patient, Serjio Farris, to the [facility]. Please see a copy of my visit note below.    HPI:   Chief complaints: Serjio Farris is a pleasant 43 year old male who presents for evaluation of an injured nail. 10 months ago he sustained a laceration along the left 4th distal finger close to the proximal nail fold. The laceration healed and he had two infections of the area after that. The skin around the nail is doing well now, however the nail has not returned to normal. He has been using aquaphor which has been helping.       PHYSICAL EXAM:    There were no vitals taken for this visit.  Skin exam performed as follows: Type 2 skin. Mood appropriate  Alert and Oriented X 3. Well developed, well nourished in no distress.  General appearance: Normal  Head including face: Normal  Eyes: conjunctiva and lids: Normal  Mouth: Lips, teeth, gums: Normal  Neck: Normal  Skin: Scalp and body hair: See below    Dystrophic nail of the left 4th fingernail without visible cuticle    ASSESSMENT/PLAN:     Nail matrix injury - discussed that unfortunately there is no good treatment for this. Continue aquaphor daily.           Follow-up: PRN  CC:   Scribed By: Ratna Caban, MS, PAPREETI      Again, thank you for allowing me to participate in the care of your patient.        Sincerely,        Ratna Caban PA-C

## 2024-03-15 NOTE — TELEPHONE ENCOUNTER
Sent new prescription for colchicine 0.6 mg tablets at MT visit on 3/15/24. Per [facility] formulary, Mitigare is no longer on formulary and colchicine tablets are tier 3.    Fidelia Álvarez, PharmD  Medication Therapy Management Pharmacist  [facility]

## 2024-03-18 NOTE — TELEPHONE ENCOUNTER
Spoke with the Pt last week in regards tot he message below.     Last week, we were able to get the provider to approve a early refill but the pharmacy did not have in stock and Pt would have needed to find another pharmacy that had it in stack for a early refill.     Pt was suppose to call the clinic back to inform of new pharmacy to transfer for an early refill.     Maryan Shetty

## 2024-03-18 NOTE — TELEPHONE ENCOUNTER
LVMTCB to see if Pt was able to find another pharmacy to get his medication refilled early.     Maryan Shetty

## 2024-04-02 ENCOUNTER — LAB (OUTPATIENT)
Dept: LAB | Facility: CLINIC | Age: 44
End: 2024-04-02
Payer: COMMERCIAL

## 2024-04-02 DIAGNOSIS — M35.2 BEHCET'S DISEASE (H): ICD-10-CM

## 2024-04-02 LAB
BASOPHILS # BLD AUTO: 0 10E3/UL (ref 0–0.2)
BASOPHILS NFR BLD AUTO: 0 %
EOSINOPHIL # BLD AUTO: 0 10E3/UL (ref 0–0.7)
EOSINOPHIL NFR BLD AUTO: 1 %
ERYTHROCYTE [DISTWIDTH] IN BLOOD BY AUTOMATED COUNT: 12.3 % (ref 10–15)
ERYTHROCYTE [SEDIMENTATION RATE] IN BLOOD BY WESTERGREN METHOD: 7 MM/HR (ref 0–15)
HCT VFR BLD AUTO: 39.4 % (ref 40–53)
HGB BLD-MCNC: 13.6 G/DL (ref 13.3–17.7)
IMM GRANULOCYTES # BLD: 0 10E3/UL
IMM GRANULOCYTES NFR BLD: 0 %
LYMPHOCYTES # BLD AUTO: 1.9 10E3/UL (ref 0.8–5.3)
LYMPHOCYTES NFR BLD AUTO: 29 %
MCH RBC QN AUTO: 30.3 PG (ref 26.5–33)
MCHC RBC AUTO-ENTMCNC: 34.5 G/DL (ref 31.5–36.5)
MCV RBC AUTO: 88 FL (ref 78–100)
MONOCYTES # BLD AUTO: 0.6 10E3/UL (ref 0–1.3)
MONOCYTES NFR BLD AUTO: 9 %
NEUTROPHILS # BLD AUTO: 4.1 10E3/UL (ref 1.6–8.3)
NEUTROPHILS NFR BLD AUTO: 62 %
PLATELET # BLD AUTO: 199 10E3/UL (ref 150–450)
RBC # BLD AUTO: 4.49 10E6/UL (ref 4.4–5.9)
WBC # BLD AUTO: 6.6 10E3/UL (ref 4–11)

## 2024-04-02 PROCEDURE — 36415 COLL VENOUS BLD VENIPUNCTURE: CPT

## 2024-04-02 PROCEDURE — 80053 COMPREHEN METABOLIC PANEL: CPT

## 2024-04-02 PROCEDURE — 86140 C-REACTIVE PROTEIN: CPT

## 2024-04-02 PROCEDURE — 85025 COMPLETE CBC W/AUTO DIFF WBC: CPT

## 2024-04-02 PROCEDURE — 85652 RBC SED RATE AUTOMATED: CPT

## 2024-04-03 LAB
ALBUMIN SERPL BCG-MCNC: 4.4 G/DL (ref 3.5–5.2)
ALP SERPL-CCNC: 70 U/L (ref 40–150)
ALT SERPL W P-5'-P-CCNC: 20 U/L (ref 0–70)
ANION GAP SERPL CALCULATED.3IONS-SCNC: 9 MMOL/L (ref 7–15)
AST SERPL W P-5'-P-CCNC: 20 U/L (ref 0–45)
BILIRUB SERPL-MCNC: 0.5 MG/DL
BUN SERPL-MCNC: 17.6 MG/DL (ref 6–20)
CALCIUM SERPL-MCNC: 9.2 MG/DL (ref 8.6–10)
CHLORIDE SERPL-SCNC: 102 MMOL/L (ref 98–107)
CREAT SERPL-MCNC: 1.07 MG/DL (ref 0.67–1.17)
CRP SERPL-MCNC: <3 MG/L
DEPRECATED HCO3 PLAS-SCNC: 27 MMOL/L (ref 22–29)
EGFRCR SERPLBLD CKD-EPI 2021: 89 ML/MIN/1.73M2
GLUCOSE SERPL-MCNC: 120 MG/DL (ref 70–99)
POTASSIUM SERPL-SCNC: 4.1 MMOL/L (ref 3.4–5.3)
PROT SERPL-MCNC: 6.9 G/DL (ref 6.4–8.3)
SODIUM SERPL-SCNC: 138 MMOL/L (ref 135–145)

## 2024-05-09 ENCOUNTER — TELEPHONE (OUTPATIENT)
Dept: FAMILY MEDICINE | Facility: CLINIC | Age: 44
End: 2024-05-09
Payer: COMMERCIAL

## 2024-05-09 NOTE — LETTER
May 9, 2024      Serjio Farris  [address] [address] [facility] [address]        Lucy Morales,     Unfortunately, Dr. Cerna  will be out of the office on 06/25/2024 and we need to reschedule your appointment. We have also contacted you through your MyChart and left a message on [phone]. This appointment has been cancelled, so please reschedule by calling our clinic at [phone] or reschedule your appointment through [employer].     Sorry for the inconvenience.     [facility]            Sincerely,        Sabas Cerna MD

## 2024-05-09 NOTE — TELEPHONE ENCOUNTER
LVM for the Pt to call the clinic back to R/S their upcoming appt w/ Dr. Cerna.     MCM and letter was sent to the Pt as well. Appt has been canceled.     Maryan Shetty     [employer]

## 2024-05-14 DIAGNOSIS — F33.1 MODERATE EPISODE OF RECURRENT MAJOR DEPRESSIVE DISORDER (H): ICD-10-CM

## 2024-05-14 DIAGNOSIS — F41.1 GENERALIZED ANXIETY DISORDER: ICD-10-CM

## 2024-05-15 RX ORDER — SERTRALINE HYDROCHLORIDE 25 MG/1
25 TABLET, FILM COATED ORAL DAILY
Qty: 30 TABLET | Refills: 2 | Status: SHIPPED | OUTPATIENT
Start: 2024-05-15 | End: 2024-06-11

## 2024-05-15 RX ORDER — TRAZODONE HYDROCHLORIDE 50 MG/1
50 TABLET, FILM COATED ORAL
Qty: 90 TABLET | Refills: 0 | Status: SHIPPED | OUTPATIENT
Start: 2024-05-15 | End: 2024-07-18

## 2024-06-10 ASSESSMENT — PATIENT HEALTH QUESTIONNAIRE - PHQ9
SUM OF ALL RESPONSES TO PHQ QUESTIONS 1-9: 6
SUM OF ALL RESPONSES TO PHQ QUESTIONS 1-9: 6
10. IF YOU CHECKED OFF ANY PROBLEMS, HOW DIFFICULT HAVE THESE PROBLEMS MADE IT FOR YOU TO DO YOUR WORK, TAKE CARE OF THINGS AT HOME, OR GET ALONG WITH OTHER PEOPLE: SOMEWHAT DIFFICULT

## 2024-06-11 ENCOUNTER — OFFICE VISIT (OUTPATIENT)
Dept: FAMILY MEDICINE | Facility: CLINIC | Age: 44
End: 2024-06-11
Payer: COMMERCIAL

## 2024-06-11 VITALS
RESPIRATION RATE: 16 BRPM | BODY MASS INDEX: 21.92 KG/M2 | DIASTOLIC BLOOD PRESSURE: 77 MMHG | WEIGHT: 180 LBS | HEART RATE: 96 BPM | SYSTOLIC BLOOD PRESSURE: 115 MMHG | OXYGEN SATURATION: 99 % | HEIGHT: 76 IN | TEMPERATURE: 98 F

## 2024-06-11 DIAGNOSIS — F41.1 GENERALIZED ANXIETY DISORDER: ICD-10-CM

## 2024-06-11 DIAGNOSIS — M75.42 IMPINGEMENT SYNDROME, SHOULDER, LEFT: ICD-10-CM

## 2024-06-11 DIAGNOSIS — F90.2 ADHD (ATTENTION DEFICIT HYPERACTIVITY DISORDER), COMBINED TYPE: Primary | ICD-10-CM

## 2024-06-11 DIAGNOSIS — F33.1 MODERATE EPISODE OF RECURRENT MAJOR DEPRESSIVE DISORDER (H): ICD-10-CM

## 2024-06-11 PROCEDURE — 99214 OFFICE O/P EST MOD 30 MIN: CPT | Performed by: FAMILY MEDICINE

## 2024-06-11 RX ORDER — DEXTROAMPHETAMINE SACCHARATE, AMPHETAMINE ASPARTATE, DEXTROAMPHETAMINE SULFATE AND AMPHETAMINE SULFATE 5; 5; 5; 5 MG/1; MG/1; MG/1; MG/1
20 TABLET ORAL 2 TIMES DAILY
Qty: 60 TABLET | Refills: 0 | Status: SHIPPED | OUTPATIENT
Start: 2024-07-11 | End: 2024-08-10

## 2024-06-11 RX ORDER — DEXTROAMPHETAMINE SACCHARATE, AMPHETAMINE ASPARTATE, DEXTROAMPHETAMINE SULFATE AND AMPHETAMINE SULFATE 5; 5; 5; 5 MG/1; MG/1; MG/1; MG/1
20 TABLET ORAL DAILY
Qty: 30 TABLET | Refills: 0 | Status: SHIPPED | OUTPATIENT
Start: 2024-06-11 | End: 2024-06-12

## 2024-06-11 RX ORDER — SERTRALINE HYDROCHLORIDE 25 MG/1
25 TABLET, FILM COATED ORAL DAILY
Qty: 90 TABLET | Refills: 1 | Status: SHIPPED | OUTPATIENT
Start: 2024-06-11

## 2024-06-11 RX ORDER — DEXTROAMPHETAMINE SACCHARATE, AMPHETAMINE ASPARTATE, DEXTROAMPHETAMINE SULFATE AND AMPHETAMINE SULFATE 5; 5; 5; 5 MG/1; MG/1; MG/1; MG/1
20 TABLET ORAL 2 TIMES DAILY
Qty: 60 TABLET | Refills: 0 | Status: SHIPPED | OUTPATIENT
Start: 2024-08-10 | End: 2024-09-09

## 2024-06-11 ASSESSMENT — PAIN SCALES - GENERAL: PAINLEVEL: MODERATE PAIN (4)

## 2024-06-11 NOTE — PROGRESS NOTES
Assessment and Plan    (F90.2) ADHD (attention deficit hyperactivity disorder), combined type  (primary encounter diagnosis)  Comment: 3m refills provided.  May call for another 3m cycle, OV in 6m   Plan: amphetamine-dextroamphetamine (ADDERALL) 20 MG         tablet, amphetamine-dextroamphetamine         (ADDERALL) 20 MG tablet,         amphetamine-dextroamphetamine (ADDERALL) 20 MG         tablet            (F41.1) Generalized anxiety disorder  Comment: Mood stable, refilling  Plan: sertraline (ZOLOFT) 25 MG tablet            (F33.1) Moderate episode of recurrent major depressive disorder (H)  Comment: see above  Plan: sertraline (ZOLOFT) 25 MG tablet, sertraline         (ZOLOFT) 50 MG tablet            (M75.42) Impingement syndrome, shoulder, left  Comment: pt is established with [facility]  Plan:       RTC in 6m CPE    Sabas Cerna MD        Dameon Morales is a 43 year old, presenting for the following health issues:  Recheck Medication        6/11/2024     5:02 PM   Additional Questions   Roomed by HIRA QUIROGA   Accompanied by SELF         6/11/2024     5:02 PM   Patient Reported Additional Medications   Patient reports taking the following new medications NA     History of Present Illness       Reason for visit:  Adhd follow up    He eats 2-3 servings of fruits and vegetables daily.He consumes 2 sweetened beverage(s) daily.He exercises with enough effort to increase his heart rate 20 to 29 minutes per day.  He exercises with enough effort to increase his heart rate 5 days per week. He is missing 1 dose(s) of medications per week.  He is not taking prescribed medications regularly due to remembering to take.     Currently using 75mg sertraline daily, finds this helpful and no issues with side effects.    Finds 20 mg Adderall to be very helpful.  Work has been very busy and is using two tabs daily.  No issues with sleep or appetite.    Noting some shoulder pain in L shoulder for about the last 8w.  Right at the tip of  "his L shoulder.  Was seen at [facility] and did get a steroid injection. Has also been doing some icing.      Objective    /77   Pulse 96   Temp 98  F (36.7  C) (Oral)   Resp 16   Ht 1.93 m (6' 4\")   Wt 81.6 kg (180 lb)   SpO2 99%   BMI 21.91 kg/m    Body mass index is 21.91 kg/m .  Physical Exam  Vitals and nursing note reviewed.   Constitutional:       Appearance: Normal appearance.   Skin:     General: Skin is warm and dry.   Neurological:      General: No focal deficit present.      Mental Status: He is alert and oriented to person, place, and time.   Psychiatric:         Mood and Affect: Mood normal.         Behavior: Behavior normal.              Signed Electronically by: Sabas Cerna MD    "

## 2024-06-12 ENCOUNTER — MYC MEDICAL ADVICE (OUTPATIENT)
Dept: FAMILY MEDICINE | Facility: CLINIC | Age: 44
End: 2024-06-12
Payer: COMMERCIAL

## 2024-06-12 ENCOUNTER — TELEPHONE (OUTPATIENT)
Dept: FAMILY MEDICINE | Facility: CLINIC | Age: 44
End: 2024-06-12
Payer: COMMERCIAL

## 2024-06-12 DIAGNOSIS — M75.42 IMPINGEMENT SYNDROME, SHOULDER, LEFT: ICD-10-CM

## 2024-06-12 NOTE — TELEPHONE ENCOUNTER
The order we received for Adderall 20 is written for 1 tablet once daily, In the past he's taken it twice daily. The July and August orders are correct we just need a new order for this month.    [facility]  Phone: [phone]    Thank You,  Mindy Sherman  T  [facility] Float Dept

## 2024-06-13 RX ORDER — DEXTROAMPHETAMINE SACCHARATE, AMPHETAMINE ASPARTATE, DEXTROAMPHETAMINE SULFATE AND AMPHETAMINE SULFATE 5; 5; 5; 5 MG/1; MG/1; MG/1; MG/1
20 TABLET ORAL 2 TIMES DAILY
Qty: 60 TABLET | Refills: 0 | Status: SHIPPED | OUTPATIENT
Start: 2024-06-13

## 2024-07-15 ENCOUNTER — VIRTUAL VISIT (OUTPATIENT)
Dept: FAMILY MEDICINE | Facility: CLINIC | Age: 44
End: 2024-07-15
Payer: COMMERCIAL

## 2024-07-15 ENCOUNTER — TELEPHONE (OUTPATIENT)
Dept: FAMILY MEDICINE | Facility: CLINIC | Age: 44
End: 2024-07-15

## 2024-07-15 DIAGNOSIS — Z02.82 PRE-ADOPTION VISIT FOR ADOPTIVE PARENT: Primary | ICD-10-CM

## 2024-07-15 PROCEDURE — 99212 OFFICE O/P EST SF 10 MIN: CPT | Mod: 95 | Performed by: FAMILY MEDICINE

## 2024-07-15 NOTE — PROGRESS NOTES
Andrew is a 44 year old who is being evaluated via a billable video visit.    How would you like to obtain your AVS? MyChart  If the video visit is dropped, the invitation should be resent by: Text to cell phone: [phone]  Will anyone else be joining your video visit? No      Assessment and Plan    (Z02.82) Pre-adoption visit for adoptive parent  (primary encounter diagnosis)  Comment: will complete forms, may  once done  Plan:     Sabas Cerna MD     Subjective   Andrew is a 44 year old, presenting for the following health issues:  Forms        7/15/2024     3:51 PM   Additional Questions   Roomed by aileen     History of Present Illness       Reason for visit:  Need to have form filled out regarding physical for adoption homestudy    He eats 2-3 servings of fruits and vegetables daily.He consumes 2 sweetened beverage(s) daily.He exercises with enough effort to increase his heart rate 20 to 29 minutes per day.  He exercises with enough effort to increase his heart rate 4 days per week.   He is taking medications regularly.     Still considering adoption, last one didn't work out.  Needs to renew forms for adoption agency.      Otherwise well, no acute concerns.      Objective           Vitals:  No vitals were obtained today due to virtual visit.    Physical Exam   GENERAL: alert and no distress  EYES: Eyes grossly normal to inspection.  No discharge or erythema, or obvious scleral/conjunctival abnormalities.  RESP: No audible wheeze, cough, or visible cyanosis.    SKIN: Visible skin clear. No significant rash, abnormal pigmentation or lesions.  NEURO: Cranial nerves grossly intact.  Mentation and speech appropriate for age.  PSYCH: Appropriate affect, tone, and pace of words          Video-Visit Details    Type of service:  Video Visit   Originating Location (pt. Location): Home    Distant Location (provider location):  Off-site  Platform used for Video Visit: Clara  Signed Electronically by: Sabas Anderson  MD Eryn

## 2024-07-15 NOTE — TELEPHONE ENCOUNTER
Placed forms in provider's basket for review and signature.     Daysi Bianchi  Lead   [facility]

## 2024-07-15 NOTE — TELEPHONE ENCOUNTER
Forms/Letter Request    Type of form/letter: OTHER: Physical Exam       Do we have the form/letter: Yes:     Who is the form from? Patient    Where did/will the form come from? Patient or family brought in       When is form/letter needed by: ASAP    How would you like the form/letter returned:     Patient Notified form requests are processed in 5-7 business days:Yes    Could we send this information to you in [facility] or would you prefer to receive a phone call?:   Patient would prefer a phone call   Okay to leave a detailed message?: Yes at Other phone number:  Danielle Farris pt wife  [phone]

## 2024-07-16 NOTE — TELEPHONE ENCOUNTER
Spoke with wife. Wife will  form later this week.     Daysi Bianchi  Lead   [facility]

## 2024-07-17 DIAGNOSIS — F41.1 GENERALIZED ANXIETY DISORDER: ICD-10-CM

## 2024-07-18 RX ORDER — TRAZODONE HYDROCHLORIDE 50 MG/1
TABLET, FILM COATED ORAL
Qty: 90 TABLET | Refills: 1 | Status: SHIPPED | OUTPATIENT
Start: 2024-07-18

## 2024-07-23 ENCOUNTER — MYC MEDICAL ADVICE (OUTPATIENT)
Dept: FAMILY MEDICINE | Facility: CLINIC | Age: 44
End: 2024-07-23
Payer: COMMERCIAL

## 2024-07-23 DIAGNOSIS — M35.2 BEHCET'S DISEASE (H): Primary | ICD-10-CM

## 2024-09-03 ENCOUNTER — MYC MEDICAL ADVICE (OUTPATIENT)
Dept: FAMILY MEDICINE | Facility: CLINIC | Age: 44
End: 2024-09-03
Payer: COMMERCIAL

## 2024-09-03 ENCOUNTER — MYC REFILL (OUTPATIENT)
Dept: FAMILY MEDICINE | Facility: CLINIC | Age: 44
End: 2024-09-03
Payer: COMMERCIAL

## 2024-09-03 DIAGNOSIS — M75.42 IMPINGEMENT SYNDROME, SHOULDER, LEFT: ICD-10-CM

## 2024-09-03 DIAGNOSIS — F90.2 ADHD (ATTENTION DEFICIT HYPERACTIVITY DISORDER), COMBINED TYPE: Primary | ICD-10-CM

## 2024-09-03 RX ORDER — DEXTROAMPHETAMINE SACCHARATE, AMPHETAMINE ASPARTATE, DEXTROAMPHETAMINE SULFATE AND AMPHETAMINE SULFATE 5; 5; 5; 5 MG/1; MG/1; MG/1; MG/1
20 TABLET ORAL 2 TIMES DAILY
Qty: 60 TABLET | Refills: 0 | Status: SHIPPED | OUTPATIENT
Start: 2024-09-09 | End: 2024-10-09

## 2024-09-03 RX ORDER — DEXTROAMPHETAMINE SACCHARATE, AMPHETAMINE ASPARTATE, DEXTROAMPHETAMINE SULFATE AND AMPHETAMINE SULFATE 5; 5; 5; 5 MG/1; MG/1; MG/1; MG/1
20 TABLET ORAL 2 TIMES DAILY
Qty: 60 TABLET | Refills: 0 | Status: SHIPPED | OUTPATIENT
Start: 2024-11-08 | End: 2024-12-08

## 2024-09-03 RX ORDER — DEXTROAMPHETAMINE SACCHARATE, AMPHETAMINE ASPARTATE, DEXTROAMPHETAMINE SULFATE AND AMPHETAMINE SULFATE 5; 5; 5; 5 MG/1; MG/1; MG/1; MG/1
20 TABLET ORAL 2 TIMES DAILY
Qty: 60 TABLET | Refills: 0 | Status: SHIPPED | OUTPATIENT
Start: 2024-10-09 | End: 2024-11-08

## 2024-09-03 RX ORDER — DEXTROAMPHETAMINE SACCHARATE, AMPHETAMINE ASPARTATE, DEXTROAMPHETAMINE SULFATE AND AMPHETAMINE SULFATE 5; 5; 5; 5 MG/1; MG/1; MG/1; MG/1
20 TABLET ORAL 2 TIMES DAILY
Qty: 60 TABLET | Refills: 0 | OUTPATIENT
Start: 2024-09-03

## 2024-09-05 ENCOUNTER — NURSE TRIAGE (OUTPATIENT)
Dept: PEDIATRICS | Facility: CLINIC | Age: 44
End: 2024-09-05
Payer: COMMERCIAL

## 2024-09-05 NOTE — TELEPHONE ENCOUNTER
S-(situation): Received call from patient's spouse requesting appointment. Spouse was transferred to triage. Patient is not present at this time and there is no CTC on file to speak with spouse. Spouse got patient on the other phone line and RN triaged symptoms.     B-(background): Patient symptoms started 2-3 months ago. Denies any skin injury that started symptoms.     A-(assessment): Patient has dry skin on his left ear. The skin seems red and irritated, feels hot to touch and seems swollen. Skin looks flaky. Skin sometimes bleeds or leaks drainage that is mixture of blood and clear fluid. Denies any fever. Patient states it is not painful.     R-(recommendations): Per protocol, advised visit. Scheduled per patient's spouse request at [facility] 9/9/24. RN advised if any new/worsening symptoms or signs of infection (pus, red streaks, fever) to be seen sooner. Spouse agreed with plan.     Reason for Disposition   Small swelling or lump present > 1 week    Additional Information   Negative: All other earaches  (Exceptions: Earache lasting < 1 hour, and earache from air travel.)   Negative: SEVERE pain (e.g., excruciating)   Negative: Swelling is painful to touch AND fever   Negative: Swelling is red and fever   Negative: Swelling is red and size > 2 inches (5.0 cm)   Negative: Swelling is painful to touch and no fever   Negative: Looks like a boil, infected sore, deep ulcer or other infected rash   Negative: Small growth, spot, bump, or pigmented area of skin (e.g., moles, skin tags, wart, melanoma, skin cancer)   Negative: Inguinal hernia previously diagnosed by a doctor (or NP/PA)   Negative: Followed a skin injury   Negative: Followed an insect bite   Negative: Swelling of ankle joint   Negative: Swelling of entire face   Negative: Swelling of eyelid   Negative: Swelling of knee joint   Negative: Swelling of labia   Negative: Swelling of lymph node suspected   Negative: Swelling of rectum   Negative: Swelling  "of scrotum   Negative: Swelling of surgical incision   Negative: Swelling of vaccination site   Negative: Hernia suspected (bulge in groin or abdomen) and painful or vomiting   Negative: Swollen lump in groin and pulsating (like heartbeat)   Negative: Patient sounds very sick or weak to the triager   Negative: Sounds like a life-threatening emergency to the triager    Answer Assessment - Initial Assessment Questions  1. APPEARANCE of SWELLING: \"What does it look like?\"      Red and irritated, flaky skin.   2. SIZE: \"How large is the swelling?\" (e.g., inches, cm; or compare to size of pinhead, tip of pen, eraser, coin, pea, grape, ping pong ball)       On left outer ear  3. LOCATION: \"Where is the swelling located?\"      Left outer ear  4. ONSET: \"When did the swelling start?\"      2-3 months ago  5. COLOR: \"What color is it?\" \"Is there more than one color?\"      Red  6. PAIN: \"Is there any pain?\" If Yes, ask: \"How bad is the pain?\" (e.g., scale 1-10; or mild, moderate, severe)      - NONE (0): no pain    - MILD (1-3): doesn't interfere with normal activities     - MODERATE (4-7): interferes with normal activities or awakens from sleep     - SEVERE (8-10): excruciating pain, unable to do any normal activities      Not painful, feels hot to touch and feels swollen  7. ITCH: \"Does it itch?\" If Yes, ask: \"How bad is the itch?\"       unknown  8. CAUSE: \"What do you think caused the swelling?\" Unsure, skin infection?   9 OTHER SYMPTOMS: \"Do you have any other symptoms?\" (e.g., fever)      No fever. Skin bleeds sometimes, has drainage of blood and clear fluid.    Protocols used: Earache-A-OH, Skin Lump or Localized Swelling-A-OH    Wesley CHATMAN RN 9/5/2024 at 2:39 PM    "

## 2024-09-12 DIAGNOSIS — M35.2 BEHCET'S DISEASE (H): ICD-10-CM

## 2024-09-12 DIAGNOSIS — Z79.899 HIGH RISK MEDICATION USE: ICD-10-CM

## 2024-09-12 DIAGNOSIS — M35.2 BEHCET'S SYNDROME (H): Primary | ICD-10-CM

## 2024-09-13 ENCOUNTER — MYC REFILL (OUTPATIENT)
Dept: RHEUMATOLOGY | Facility: CLINIC | Age: 44
End: 2024-09-13
Payer: COMMERCIAL

## 2024-09-13 DIAGNOSIS — M35.2 BEHCET'S DISEASE (H): ICD-10-CM

## 2024-09-15 ENCOUNTER — HEALTH MAINTENANCE LETTER (OUTPATIENT)
Age: 44
End: 2024-09-15

## 2024-09-16 ENCOUNTER — TELEPHONE (OUTPATIENT)
Dept: FAMILY MEDICINE | Facility: CLINIC | Age: 44
End: 2024-09-16
Payer: COMMERCIAL

## 2024-09-18 RX ORDER — ADALIMUMAB 40MG/0.4ML
KIT SUBCUTANEOUS
Refills: 1 | OUTPATIENT
Start: 2024-09-18

## 2024-09-18 NOTE — TELEPHONE ENCOUNTER
Sent a message to the patient and [facility] pharmacy.  The provider has moved to another Rheumatology clinic, [facility], the patient has established care there.  Refills should be redirected.    Venus Welsh RN  Adult Rheumatology

## 2024-09-18 NOTE — TELEPHONE ENCOUNTER
Pharmacy following on rx status.    Physical Exam:    CONSTITUTIONAL:  Generally well appearing, no acute distress, alert, awake and oriented  HEENT:  Moist mucous membranes, normal voice  PULM:  No accessory muscle use, speaking full sentences  SKIN:  Normal in appearance, normal color

## 2024-09-19 RX ORDER — ADALIMUMAB 40MG/0.4ML
40 KIT SUBCUTANEOUS
Qty: 2.4 ML | Refills: 1 | OUTPATIENT
Start: 2024-09-19

## 2024-09-19 NOTE — TELEPHONE ENCOUNTER
Refused Yesterday (9/18/2024):   OTHER (patient is going to [facility] now, )   HUMIRA *CF* PEN 40 MG/0.4ML pen kit   Sig: INJECT 0.4 ML (40 MG) SUBCUTANEOUSLY EVERY 14 DAYS HOLD FOR SIGNS OF INFECTION, THEN SEEK MEDICAL ATTENTION.   Disp:    Refills: 1   Received from: Pharmacy   Encounter Details       Addressed in a different encounter.

## 2024-10-11 ENCOUNTER — MYC MEDICAL ADVICE (OUTPATIENT)
Dept: FAMILY MEDICINE | Facility: CLINIC | Age: 44
End: 2024-10-11
Payer: COMMERCIAL

## 2024-10-11 DIAGNOSIS — F90.2 ADHD (ATTENTION DEFICIT HYPERACTIVITY DISORDER), COMBINED TYPE: ICD-10-CM

## 2024-10-11 RX ORDER — DEXTROAMPHETAMINE SACCHARATE, AMPHETAMINE ASPARTATE, DEXTROAMPHETAMINE SULFATE AND AMPHETAMINE SULFATE 5; 5; 5; 5 MG/1; MG/1; MG/1; MG/1
20 TABLET ORAL 2 TIMES DAILY
Qty: 60 TABLET | Refills: 0 | Status: SHIPPED | OUTPATIENT
Start: 2024-10-11 | End: 2024-11-10

## 2024-10-11 NOTE — TELEPHONE ENCOUNTER
[facility] calling[facility]  [facility]  Asking for RX to be sent to  [facility] as that have in stock.  Out of med.  Hoping to get today.  T'd up.  Please advise.  Solange Rabago RN

## 2024-10-15 ENCOUNTER — TELEPHONE (OUTPATIENT)
Dept: FAMILY MEDICINE | Facility: CLINIC | Age: 44
End: 2024-10-15
Payer: COMMERCIAL

## 2024-10-15 DIAGNOSIS — F90.2 ADHD (ATTENTION DEFICIT HYPERACTIVITY DISORDER), COMBINED TYPE: Primary | ICD-10-CM

## 2024-10-15 RX ORDER — DEXTROAMPHETAMINE SACCHARATE, AMPHETAMINE ASPARTATE, DEXTROAMPHETAMINE SULFATE AND AMPHETAMINE SULFATE 1.25; 1.25; 1.25; 1.25 MG/1; MG/1; MG/1; MG/1
5 TABLET ORAL 2 TIMES DAILY
Qty: 60 TABLET | Refills: 0 | Status: SHIPPED | OUTPATIENT
Start: 2024-10-15 | End: 2024-11-14

## 2024-10-15 RX ORDER — DEXTROAMPHETAMINE SACCHARATE, AMPHETAMINE ASPARTATE, DEXTROAMPHETAMINE SULFATE AND AMPHETAMINE SULFATE 3.75; 3.75; 3.75; 3.75 MG/1; MG/1; MG/1; MG/1
15 TABLET ORAL 2 TIMES DAILY
Qty: 60 TABLET | Refills: 0 | Status: SHIPPED | OUTPATIENT
Start: 2024-10-15 | End: 2024-11-14

## 2024-10-15 NOTE — TELEPHONE ENCOUNTER
Wife calls.  No consent to communicate.  No personal information given.      She said the pt is on adderall 20 mg tabs.    She said she contacted [facility] the other day and they didn't have the prescription he needed in stock.  She said  Kd had some - now they melissa they don't have any.        She said she talked to Jayne at [facility] pharmacy.  She said they have adderall 15 mg and 5 mg.  She is wondering if the prescription can be changed to 15 mg and 5 mg.        He is completely out of medication.

## 2024-10-21 ENCOUNTER — NURSE TRIAGE (OUTPATIENT)
Dept: FAMILY MEDICINE | Facility: CLINIC | Age: 44
End: 2024-10-21
Payer: COMMERCIAL

## 2024-10-21 ENCOUNTER — APPOINTMENT (OUTPATIENT)
Dept: CT IMAGING | Facility: CLINIC | Age: 44
End: 2024-10-21
Attending: EMERGENCY MEDICINE
Payer: COMMERCIAL

## 2024-10-21 ENCOUNTER — HOSPITAL ENCOUNTER (EMERGENCY)
Facility: CLINIC | Age: 44
Discharge: HOME | End: 2024-10-21
Attending: EMERGENCY MEDICINE | Admitting: EMERGENCY MEDICINE
Payer: COMMERCIAL

## 2024-10-21 VITALS
SYSTOLIC BLOOD PRESSURE: 145 MMHG | RESPIRATION RATE: 18 BRPM | HEART RATE: 76 BPM | OXYGEN SATURATION: 97 % | BODY MASS INDEX: 22.5 KG/M2 | HEIGHT: 76 IN | DIASTOLIC BLOOD PRESSURE: 88 MMHG | WEIGHT: 184.75 LBS | TEMPERATURE: 98.1 F

## 2024-10-21 DIAGNOSIS — K92.1 HEMATOCHEZIA: ICD-10-CM

## 2024-10-21 DIAGNOSIS — R19.7 DIARRHEA OF PRESUMED INFECTIOUS ORIGIN: ICD-10-CM

## 2024-10-21 DIAGNOSIS — R10.2 SUPRAPUBIC ABDOMINAL PAIN: ICD-10-CM

## 2024-10-21 DIAGNOSIS — Z92.25 PERSONAL HISTORY OF IMMUNOSUPRESSION THERAPY: ICD-10-CM

## 2024-10-21 LAB
ALBUMIN SERPL BCG-MCNC: 4 G/DL (ref 3.5–5.2)
ALP SERPL-CCNC: 70 U/L (ref 40–150)
ALT SERPL W P-5'-P-CCNC: 24 U/L (ref 0–70)
ANION GAP SERPL CALCULATED.3IONS-SCNC: 12 MMOL/L (ref 7–15)
AST SERPL W P-5'-P-CCNC: 26 U/L (ref 0–45)
BASOPHILS # BLD AUTO: 0 10E3/UL (ref 0–0.2)
BASOPHILS NFR BLD AUTO: 0 %
BILIRUB SERPL-MCNC: 0.2 MG/DL
BUN SERPL-MCNC: 16.3 MG/DL (ref 6–20)
CALCIUM SERPL-MCNC: 8.8 MG/DL (ref 8.8–10.4)
CHLORIDE SERPL-SCNC: 103 MMOL/L (ref 98–107)
CREAT SERPL-MCNC: 1.06 MG/DL (ref 0.67–1.17)
EGFRCR SERPLBLD CKD-EPI 2021: 89 ML/MIN/1.73M2
EOSINOPHIL # BLD AUTO: 0.1 10E3/UL (ref 0–0.7)
EOSINOPHIL NFR BLD AUTO: 1 %
ERYTHROCYTE [DISTWIDTH] IN BLOOD BY AUTOMATED COUNT: 12.3 % (ref 10–15)
GLUCOSE SERPL-MCNC: 121 MG/DL (ref 70–99)
HCO3 SERPL-SCNC: 22 MMOL/L (ref 22–29)
HCT VFR BLD AUTO: 38.7 % (ref 40–53)
HGB BLD-MCNC: 13.1 G/DL (ref 13.3–17.7)
HOLD SPECIMEN: NORMAL
IMM GRANULOCYTES # BLD: 0 10E3/UL
IMM GRANULOCYTES NFR BLD: 0 %
LYMPHOCYTES # BLD AUTO: 1.8 10E3/UL (ref 0.8–5.3)
LYMPHOCYTES NFR BLD AUTO: 35 %
MCH RBC QN AUTO: 29.5 PG (ref 26.5–33)
MCHC RBC AUTO-ENTMCNC: 33.9 G/DL (ref 31.5–36.5)
MCV RBC AUTO: 87 FL (ref 78–100)
MONOCYTES # BLD AUTO: 0.5 10E3/UL (ref 0–1.3)
MONOCYTES NFR BLD AUTO: 10 %
NEUTROPHILS # BLD AUTO: 2.8 10E3/UL (ref 1.6–8.3)
NEUTROPHILS NFR BLD AUTO: 54 %
NRBC # BLD AUTO: 0 10E3/UL
NRBC BLD AUTO-RTO: 0 /100
PLATELET # BLD AUTO: 208 10E3/UL (ref 150–450)
POTASSIUM SERPL-SCNC: 4.5 MMOL/L (ref 3.4–5.3)
PROT SERPL-MCNC: 6.6 G/DL (ref 6.4–8.3)
RBC # BLD AUTO: 4.44 10E6/UL (ref 4.4–5.9)
SODIUM SERPL-SCNC: 137 MMOL/L (ref 135–145)
WBC # BLD AUTO: 5.3 10E3/UL (ref 4–11)

## 2024-10-21 PROCEDURE — 36415 COLL VENOUS BLD VENIPUNCTURE: CPT | Performed by: EMERGENCY MEDICINE

## 2024-10-21 PROCEDURE — 85025 COMPLETE CBC W/AUTO DIFF WBC: CPT | Performed by: EMERGENCY MEDICINE

## 2024-10-21 PROCEDURE — 250N000011 HC RX IP 250 OP 636: Performed by: EMERGENCY MEDICINE

## 2024-10-21 PROCEDURE — 99285 EMERGENCY DEPT VISIT HI MDM: CPT | Mod: 25

## 2024-10-21 PROCEDURE — 250N000009 HC RX 250: Performed by: EMERGENCY MEDICINE

## 2024-10-21 PROCEDURE — 82040 ASSAY OF SERUM ALBUMIN: CPT | Performed by: EMERGENCY MEDICINE

## 2024-10-21 PROCEDURE — 80053 COMPREHEN METABOLIC PANEL: CPT | Performed by: EMERGENCY MEDICINE

## 2024-10-21 PROCEDURE — 74177 CT ABD & PELVIS W/CONTRAST: CPT

## 2024-10-21 RX ORDER — CIPROFLOXACIN 500 MG/1
500 TABLET, FILM COATED ORAL 2 TIMES DAILY
Qty: 28 TABLET | Refills: 0 | Status: SHIPPED | OUTPATIENT
Start: 2024-10-21 | End: 2024-11-04

## 2024-10-21 RX ORDER — IOPAMIDOL 755 MG/ML
500 INJECTION, SOLUTION INTRAVASCULAR ONCE
Status: COMPLETED | OUTPATIENT
Start: 2024-10-21 | End: 2024-10-21

## 2024-10-21 RX ADMIN — SODIUM CHLORIDE 63 ML: 9 INJECTION, SOLUTION INTRAVENOUS at 20:07

## 2024-10-21 RX ADMIN — IOPAMIDOL 92 ML: 755 INJECTION, SOLUTION INTRAVENOUS at 20:07

## 2024-10-21 ASSESSMENT — COLUMBIA-SUICIDE SEVERITY RATING SCALE - C-SSRS
1. IN THE PAST MONTH, HAVE YOU WISHED YOU WERE DEAD OR WISHED YOU COULD GO TO SLEEP AND NOT WAKE UP?: NO
2. HAVE YOU ACTUALLY HAD ANY THOUGHTS OF KILLING YOURSELF IN THE PAST MONTH?: NO
6. HAVE YOU EVER DONE ANYTHING, STARTED TO DO ANYTHING, OR PREPARED TO DO ANYTHING TO END YOUR LIFE?: NO

## 2024-10-21 ASSESSMENT — ACTIVITIES OF DAILY LIVING (ADL)
ADLS_ACUITY_SCORE: 35

## 2024-10-21 NOTE — ED TRIAGE NOTES
Pt presents to the eD stating he has had diarrhea for 3 weeks, lower abd pain 7/10 intermittent, and right lower back pain 6/10. Pt states he tried taking OTC meds for diarrhea, but they didn't work.

## 2024-10-21 NOTE — TELEPHONE ENCOUNTER
"Wife calls. No consent to communicate.      Asked if the pt is there.  Pt came on the phone.  He says he has had diarrhea for 3 weeks.  He is having diarrhea 3-4 times a day.  He has not checked his fever, but feels hot and clammy.  Negative for covid on Friday.  He is having abdominal pain - he is rating that a 7/10.  The ab pain is the worst symptom.  He has some SOB as well with activity.  That is not normal for him.  He says he has blood in his stools.  The ab pain came on suddenly.  He has tried taking immodium, but it has not helped.      Advised per protocol for the pt to be seen in the ER tonight.        Reason for Disposition   Blood in bowel movements  (Exception: Blood on surface of BM with constipation.)    Additional Information   Negative: Passed out (i.e., fainted, collapsed and was not responding)   Negative: Shock suspected (e.g., cold/pale/clammy skin, too weak to stand, low BP, rapid pulse)   Negative: Sounds like a life-threatening emergency to the triager   Negative: Followed an abdomen (stomach) injury   Negative: Chest pain   Negative: Pain is mainly in upper abdomen (if needed ask: 'is it mainly above the belly button?')   Negative: Abdomen bloating or swelling are main symptoms   Negative: SEVERE abdominal pain (e.g., excruciating)   Negative: Vomiting red blood or black (coffee ground) material    Answer Assessment - Initial Assessment Questions  1. LOCATION: \"Where does it hurt?\"       Lower ab in the middle   2. RADIATION: \"Does the pain shoot anywhere else?\" (e.g., chest, back)      No   3. ONSET: \"When did the pain begin?\" (Minutes, hours or days ago)       For 3 weeks and comes and goes   4. SUDDEN: \"Gradual or sudden onset?\"      Sudden   5. PATTERN \"Does the pain come and go, or is it constant?\"     - If it comes and goes: \"How long does it last?\" \"Do you have pain now?\"      (Note: Comes and goes means the pain is intermittent. It goes away completely between bouts.)     - If " "constant: \"Is it getting better, staying the same, or getting worse?\"       (Note: Constant means the pain never goes away completely; most serious pain is constant and gets worse.)       Comes and goes   6. SEVERITY: \"How bad is the pain?\"  (e.g., Scale 1-10; mild, moderate, or severe)     - MILD (1-3): Doesn't interfere with normal activities, abdomen soft and not tender to touch.      - MODERATE (4-7): Interferes with normal activities or awakens from sleep, abdomen tender to touch.      - SEVERE (8-10): Excruciating pain, doubled over, unable to do any normal activities.        7/10   7. RECURRENT SYMPTOM: \"Have you ever had this type of stomach pain before?\" If Yes, ask: \"When was the last time?\" and \"What happened that time?\"       No   8. CAUSE: \"What do you think is causing the stomach pain?\"      Unsure   9. RELIEVING/AGGRAVATING FACTORS: \"What makes it better or worse?\" (e.g., antacids, bending or twisting motion, bowel movement)      Taking immodium, but it has not done anything   10. OTHER SYMPTOMS: \"Do you have any other symptoms?\" (e.g., back pain, diarrhea, fever, urination pain, vomiting)        Diarrhea, feels hot and clammy, has not checked temp    Protocols used: Abdominal Pain - Male-A-[place]    "

## 2024-10-22 LAB
ADV 40+41 DNA STL QL NAA+NON-PROBE: NEGATIVE
ASTRO TYP 1-8 RNA STL QL NAA+NON-PROBE: NEGATIVE
C CAYETANENSIS DNA STL QL NAA+NON-PROBE: NEGATIVE
CAMPYLOBACTER DNA SPEC NAA+PROBE: NEGATIVE
CRYPTOSP DNA STL QL NAA+NON-PROBE: NEGATIVE
E COLI O157 DNA STL QL NAA+NON-PROBE: NORMAL
E HISTOLYT DNA STL QL NAA+NON-PROBE: NEGATIVE
EAEC ASTA GENE ISLT QL NAA+PROBE: NEGATIVE
EC STX1+STX2 GENES STL QL NAA+NON-PROBE: NEGATIVE
EPEC EAE GENE STL QL NAA+NON-PROBE: NEGATIVE
ETEC LTA+ST1A+ST1B TOX ST NAA+NON-PROBE: NEGATIVE
G LAMBLIA DNA STL QL NAA+NON-PROBE: NEGATIVE
NOROVIRUS GI+II RNA STL QL NAA+NON-PROBE: NEGATIVE
P SHIGELLOIDES DNA STL QL NAA+NON-PROBE: NEGATIVE
RVA RNA STL QL NAA+NON-PROBE: NEGATIVE
SALMONELLA SP RPOD STL QL NAA+PROBE: NEGATIVE
SAPO I+II+IV+V RNA STL QL NAA+NON-PROBE: NEGATIVE
SHIGELLA SP+EIEC IPAH ST NAA+NON-PROBE: NEGATIVE
V CHOLERAE DNA SPEC QL NAA+PROBE: NEGATIVE
VIBRIO DNA SPEC NAA+PROBE: NEGATIVE
Y ENTEROCOL DNA STL QL NAA+PROBE: NEGATIVE

## 2024-10-22 PROCEDURE — 87507 IADNA-DNA/RNA PROBE TQ 12-25: CPT | Performed by: EMERGENCY MEDICINE

## 2024-10-22 NOTE — ED PROVIDER NOTES
"  Emergency Department Note      History of Present Illness     Chief Complaint   Abdominal Pain      HPI   Serjio Farris is a 44 year old male on Humira who presents for abdominal pain and bloody diarrhea. Patient reports of bloody diarrhea for about 2.5 weeks. Reports stool is watery, light brown, and bright red blood. Reports blood is pouring out and does not think its clots. Reports imodium helped for a week and then the diarrhea returned. Currently he has 5-6 episodes a day. Reports of lower central abdominal pain, pain is burning, cramping and tender to the touch. Reports abdominal pain is constant and worse with bowel movements Reports of back pain over the SI joint. Reports new symptoms of low urine production. Reports recent covid infection that has resolved. Denies abnormal ingestions or contacts. Reports everyone around him is fine. Denies antibiotic use. Reports recent trip to [place] around when symptoms started.     Independent Historian   None    Review of External Notes   I reviewed note from 4-2-24, similar hemoglobin      Past Medical History     Medical History and Problem List   Oral canker sores  ADHD  Behcet's disease  WILMAR  CVA  Depression  Stroke    Medications   Deltasone  Humira  Adderall  Colcrys  Zoloft  Desyrel      Surgical History   Colonoscopy  Esophagoscopy, gastroscopy, duodenoscopy  Breath hydrogen test  Tooth extraction  Testicular torsion     Physical Exam     Patient Vitals for the past 24 hrs:   BP Temp Temp src Pulse Resp SpO2 Height Weight   10/21/24 2311 (!) 145/88 -- -- 76 18 97 % -- --   10/21/24 1726 (!) 145/94 98.1  F (36.7  C) Temporal 87 16 99 % 1.93 m (6' 4\") 83.8 kg (184 lb 11.9 oz)     Physical Exam  General: The patient is alert, in no respiratory distress.    HENT: Mucous membranes moist.    Cardiovascular: Regular rate and rhythm. Good pulses in all four extremities. Normal capillary refill and skin turgor.     Respiratory: Lungs are clear. No nasal flaring. No " retractions. No wheezing, no crackles.    Gastrointestinal: suprapubic tenderness. Abdomen soft. No guarding, no rebound. No palpable hernias.     Musculoskeletal: No gross deformity.     Skin: No rashes or petechiae.     Neurologic: The patient is alert and oriented x3. GCS 15. No testable cranial nerve deficit. Follows commands with clear and appropriate speech. Gives appropriate answers. Good strength in all extremities. No gross neurologic deficit. Gross sensation intact. Pupils are round and reactive. No meningismus.     Lymphatic: No cervical adenopathy. No lower extremity swelling.    Psychiatric: The patient is non-tearful.      Diagnostics     Lab Results   Labs Ordered and Resulted from Time of ED Arrival to Time of ED Departure   COMPREHENSIVE METABOLIC PANEL - Abnormal       Result Value    Sodium 137      Potassium 4.5      Carbon Dioxide (CO2) 22      Anion Gap 12      Urea Nitrogen 16.3      Creatinine 1.06      GFR Estimate 89      Calcium 8.8      Chloride 103      Glucose 121 (*)     Alkaline Phosphatase 70      AST 26      ALT 24      Protein Total 6.6      Albumin 4.0      Bilirubin Total 0.2     CBC WITH PLATELETS AND DIFFERENTIAL - Abnormal    WBC Count 5.3      RBC Count 4.44      Hemoglobin 13.1 (*)     Hematocrit 38.7 (*)     MCV 87      MCH 29.5      MCHC 33.9      RDW 12.3      Platelet Count 208      % Neutrophils 54      % Lymphocytes 35      % Monocytes 10      % Eosinophils 1      % Basophils 0      % Immature Granulocytes 0      NRBCs per 100 WBC 0      Absolute Neutrophils 2.8      Absolute Lymphocytes 1.8      Absolute Monocytes 0.5      Absolute Eosinophils 0.1      Absolute Basophils 0.0      Absolute Immature Granulocytes 0.0      Absolute NRBCs 0.0         Imaging   CT Abdomen Pelvis w Contrast   Final Result   IMPRESSION:    1.  No acute CT abnormality of the abdomen/pelvis.   2.  Moderate to large left hydrocele, similar to prior.             Independent Interpretation    None    ED Course      Medications Administered   Medications   CT scan flush (63 mLs Intravenous $Given 10/21/24 2007)   iopamidol (ISOVUE-370) solution 500 mL (92 mLs Intravenous $Given 10/21/24 2007)       Procedures   Procedures     Discussion of Management   None    ED Course   ED Course as of 10/22/24 0147   Mon Oct 21, 2024   1925 I obtained history and examined the patient as noted above     6181 I rechecked the patient and explained findings.         Additional Documentation  None    Medical Decision Making / Diagnosis     CMS Diagnoses: None    MIPS       None    [facility]   Serjio Farris is a 44 year old male who reports that he had COVID after he went home to [place].  He was not drinking unfiltered water and his wife had gotten sick but otherwise the patient has a low pretest probability of a bacterial colitis.  He is immunosuppressed I therefore was concerned that he was having abdominal tenderness and considered there could be more significant process such as abscess from diverticulitis or other issues such as appendicitis.  We discussed the risk and benefits of CT which was undertaken thankfully this was reassuring.  The patient did have blood in his stool he could not give us a stool sample today I did write him a prescription for antibiotic in case he could not get into get a stool sample performed but stressed he should hold off until results return he is currently adequately pain controlled his labs are reassuring and he was discharged to close outpatient follow-up.    Disposition   The patient was discharged.     Diagnosis     ICD-10-CM    1. Suprapubic abdominal pain  R10.2       2. Personal history of immunosupression therapy  Z92.25       3. Hematochezia  K92.1       4. Diarrhea of presumed infectious origin  R19.7            Discharge Medications   Discharge Medication List as of 10/21/2024 11:05 PM        START taking these medications    Details   ciprofloxacin (CIPRO) 500 MG tablet Take 1  tablet (500 mg) by mouth 2 times daily for 14 days., Disp-28 tablet, R-0, Local Print               Scribe Disclosure:  I, Jacinto Sigala, am serving as a scribe at 7:23 PM on 10/21/2024 to document services personally performed by Reji Ruiz MD based on my observations and the provider's statements to me.        Reji Ruiz MD  10/22/24 0149

## 2024-11-13 ENCOUNTER — MYC MEDICAL ADVICE (OUTPATIENT)
Dept: FAMILY MEDICINE | Facility: CLINIC | Age: 44
End: 2024-11-13
Payer: COMMERCIAL

## 2024-11-13 DIAGNOSIS — F90.2 ADHD (ATTENTION DEFICIT HYPERACTIVITY DISORDER), COMBINED TYPE: ICD-10-CM

## 2024-11-13 RX ORDER — DEXTROAMPHETAMINE SACCHARATE, AMPHETAMINE ASPARTATE, DEXTROAMPHETAMINE SULFATE AND AMPHETAMINE SULFATE 5; 5; 5; 5 MG/1; MG/1; MG/1; MG/1
20 TABLET ORAL 2 TIMES DAILY
Qty: 60 TABLET | Refills: 0 | OUTPATIENT
Start: 2024-11-13

## 2024-11-13 NOTE — TELEPHONE ENCOUNTER
Pt requesting Adderall be sent to new pharmacy that has dosage in stock.    Order pended with new pharm. Routing to provider.    Mel Hand RN on 11/13/2024 at 10:16 AM

## 2024-11-13 NOTE — TELEPHONE ENCOUNTER
See [employer] message. Pt now requesting us NOT to sign. Canceled order.  Closing encounter    Mel Hand RN on 11/13/2024 at 11:55 AM

## 2024-12-17 DIAGNOSIS — F41.1 GENERALIZED ANXIETY DISORDER: ICD-10-CM

## 2024-12-19 RX ORDER — TRAZODONE HYDROCHLORIDE 50 MG/1
TABLET, FILM COATED ORAL
Qty: 90 TABLET | Refills: 1 | Status: SHIPPED | OUTPATIENT
Start: 2024-12-19

## 2025-01-02 ENCOUNTER — TELEPHONE (OUTPATIENT)
Facility: CLINIC | Age: 45
End: 2025-01-02
Payer: COMMERCIAL

## 2025-01-02 NOTE — TELEPHONE ENCOUNTER
BRENNA Bañuelos, [facility]  Specialty Pharmacy Clinic Liaison     [facility]    [email or web address]     Phone: [phone]  Fax: [phone]

## 2025-01-05 SDOH — HEALTH STABILITY: PHYSICAL HEALTH: ON AVERAGE, HOW MANY MINUTES DO YOU ENGAGE IN EXERCISE AT THIS LEVEL?: 60 MIN

## 2025-01-05 SDOH — HEALTH STABILITY: PHYSICAL HEALTH: ON AVERAGE, HOW MANY DAYS PER WEEK DO YOU ENGAGE IN MODERATE TO STRENUOUS EXERCISE (LIKE A BRISK WALK)?: 3 DAYS

## 2025-01-05 ASSESSMENT — SOCIAL DETERMINANTS OF HEALTH (SDOH): HOW OFTEN DO YOU GET TOGETHER WITH FRIENDS OR RELATIVES?: ONCE A WEEK

## 2025-01-06 ASSESSMENT — PATIENT HEALTH QUESTIONNAIRE - PHQ9
SUM OF ALL RESPONSES TO PHQ QUESTIONS 1-9: 13
SUM OF ALL RESPONSES TO PHQ QUESTIONS 1-9: 13
10. IF YOU CHECKED OFF ANY PROBLEMS, HOW DIFFICULT HAVE THESE PROBLEMS MADE IT FOR YOU TO DO YOUR WORK, TAKE CARE OF THINGS AT HOME, OR GET ALONG WITH OTHER PEOPLE: SOMEWHAT DIFFICULT

## 2025-01-07 ENCOUNTER — OFFICE VISIT (OUTPATIENT)
Dept: FAMILY MEDICINE | Facility: CLINIC | Age: 45
End: 2025-01-07
Attending: FAMILY MEDICINE
Payer: COMMERCIAL

## 2025-01-07 VITALS
OXYGEN SATURATION: 99 % | BODY MASS INDEX: 22.41 KG/M2 | RESPIRATION RATE: 14 BRPM | WEIGHT: 184 LBS | DIASTOLIC BLOOD PRESSURE: 78 MMHG | TEMPERATURE: 98 F | HEIGHT: 76 IN | HEART RATE: 96 BPM | SYSTOLIC BLOOD PRESSURE: 132 MMHG

## 2025-01-07 DIAGNOSIS — F33.1 MODERATE EPISODE OF RECURRENT MAJOR DEPRESSIVE DISORDER (H): ICD-10-CM

## 2025-01-07 DIAGNOSIS — M35.2 BEHCET'S SYNDROME (H): ICD-10-CM

## 2025-01-07 DIAGNOSIS — F90.2 ADHD (ATTENTION DEFICIT HYPERACTIVITY DISORDER), COMBINED TYPE: ICD-10-CM

## 2025-01-07 DIAGNOSIS — Z00.00 PREVENTATIVE HEALTH CARE: Primary | ICD-10-CM

## 2025-01-07 DIAGNOSIS — M35.2 BEHCET'S DISEASE (H): ICD-10-CM

## 2025-01-07 DIAGNOSIS — F41.1 GENERALIZED ANXIETY DISORDER: ICD-10-CM

## 2025-01-07 DIAGNOSIS — Z79.899 HIGH RISK MEDICATION USE: ICD-10-CM

## 2025-01-07 LAB
BASOPHILS # BLD AUTO: 0 10E3/UL (ref 0–0.2)
BASOPHILS NFR BLD AUTO: 0 %
EOSINOPHIL # BLD AUTO: 0 10E3/UL (ref 0–0.7)
EOSINOPHIL NFR BLD AUTO: 1 %
ERYTHROCYTE [DISTWIDTH] IN BLOOD BY AUTOMATED COUNT: 11.5 % (ref 10–15)
HCT VFR BLD AUTO: 34.5 % (ref 40–53)
HGB BLD-MCNC: 12 G/DL (ref 13.3–17.7)
IMM GRANULOCYTES # BLD: 0 10E3/UL
IMM GRANULOCYTES NFR BLD: 0 %
LYMPHOCYTES # BLD AUTO: 2.2 10E3/UL (ref 0.8–5.3)
LYMPHOCYTES NFR BLD AUTO: 37 %
MCH RBC QN AUTO: 28.9 PG (ref 26.5–33)
MCHC RBC AUTO-ENTMCNC: 34.8 G/DL (ref 31.5–36.5)
MCV RBC AUTO: 83 FL (ref 78–100)
MONOCYTES # BLD AUTO: 0.5 10E3/UL (ref 0–1.3)
MONOCYTES NFR BLD AUTO: 8 %
NEUTROPHILS # BLD AUTO: 3.2 10E3/UL (ref 1.6–8.3)
NEUTROPHILS NFR BLD AUTO: 54 %
PLATELET # BLD AUTO: 203 10E3/UL (ref 150–450)
RBC # BLD AUTO: 4.15 10E6/UL (ref 4.4–5.9)
WBC # BLD AUTO: 6 10E3/UL (ref 4–11)

## 2025-01-07 PROCEDURE — 80048 BASIC METABOLIC PNL TOTAL CA: CPT | Performed by: FAMILY MEDICINE

## 2025-01-07 PROCEDURE — 36415 COLL VENOUS BLD VENIPUNCTURE: CPT | Performed by: FAMILY MEDICINE

## 2025-01-07 PROCEDURE — 85025 COMPLETE CBC W/AUTO DIFF WBC: CPT | Performed by: FAMILY MEDICINE

## 2025-01-07 PROCEDURE — 84450 TRANSFERASE (AST) (SGOT): CPT | Performed by: FAMILY MEDICINE

## 2025-01-07 PROCEDURE — 84460 ALANINE AMINO (ALT) (SGPT): CPT | Performed by: FAMILY MEDICINE

## 2025-01-07 ASSESSMENT — PAIN SCALES - GENERAL: PAINLEVEL_OUTOF10: NO PAIN (0)

## 2025-01-07 NOTE — PROGRESS NOTES
Preventive Care Visit  [facility]  Sabas Cerna MD, Family Medicine  Jan 7, 2025      Assessment and Plan    (Z00.00) Preventative health care  (primary encounter diagnosis)  Comment:   Plan: Basic metabolic panel  (Ca, Cl, CO2, Creat,         Gluc, K, Na, BUN)            (F33.1) Moderate episode of recurrent major depressive disorder (H)  Comment: mood stable, no acute cocnerns.  Recently had refills on medication  Plan:     (M35.2) Behcet's disease (H)  Comment: follows with mahesh for this  Plan:     (F90.2) ADHD (attention deficit hyperactivity disorder), combined type  Comment: see mote from 12/24, utd with follow up and has Rx for the next few months  Plan:     (F41.1) Generalized anxiety disorder  Comment: Mood stable,no concerns.  Plan:       RTC in [address] [place] [address] for ADHD    Sabas Cerna MD     Dameon Morales is a 44 year old, presenting for the following:  Physical        1/7/2025     4:44 PM   Additional Questions   Roomed by Mel Mehta VF          No additional concerns.  Feeling well.  Follows closely with mahesh for his Bachet's.  Has labs for them he needs to have drawn.      HPI      Health Care Directive  Patient does not have a Health Care Directive: Discussed advance care planning with patient; however, patient declined at this time.      1/5/2025   General Health   How would you rate your overall physical health? Good   Feel stress (tense, anxious, or unable to sleep) Rather much   (!) STRESS CONCERN      1/5/2025   Nutrition   Three or more servings of calcium each day? Yes   Diet: Regular (no restrictions)   How many servings of fruit and vegetables per day? (!) 2-3   How many sweetened beverages each day? (!) 2         1/5/2025   Exercise   Days per week of moderate/strenous exercise 3 days   Average minutes spent exercising at this level 60 min         1/5/2025   Social Factors   Frequency of gathering with friends or relatives Once a week   Worry food won't last  until get money to buy more No   Food not last or not have enough money for food? No   Do you have housing? (Housing is defined as stable permanent housing and does not include staying ouside in a car, in a tent, in an abandoned building, in an overnight shelter, or couch-surfing.) Yes   Are you worried about losing your housing? No   Lack of transportation? No   Unable to get utilities (heat,electricity)? No         1/5/2025   Dental   Dentist two times every year? Yes          Today's PHQ-9 Score:       1/6/2025     5:14 PM   PHQ-9 SCORE   PHQ-9 Total Score MyChart 13 (Moderate depression)   PHQ-9 Total Score 13        Patient-reported         1/5/2025   Substance Use   Alcohol more than 3/day or more than 7/wk No   Do you use any other substances recreationally? No     Social History     Tobacco Use    Smoking status: Former     Current packs/day: 0.00     Average packs/day: 0.1 packs/day for 15.7 years (1.6 ttl pk-yrs)     Types: Cigarettes     Start date: 1/1/1999     Quit date: 9/1/2014     Years since quitting: 10.3    Smokeless tobacco: Never    Tobacco comments:     3 cigs day 13 years   Vaping Use    Vaping status: Never Used   Substance Use Topics    Alcohol use: Not Currently     Comment: Extremely rarely (major occassasions) bad affects on stomach    Drug use: Never           1/5/2025   STI Screening   New sexual partner(s) since last STI/HIV test? No   ASCVD Risk   The ASCVD Risk score (Mao ABERNATHY, et al., 2019) failed to calculate for the following reasons:    Risk score cannot be calculated because patient has a medical history suggesting prior/existing ASCVD        1/5/2025   Contraception/Family Planning   Questions about contraception or family planning No        Reviewed and updated as needed this visit by Provider                         Objective    Exam  /78 (BP Location: Right arm, Patient Position: Sitting, Cuff Size: Adult Large)   Pulse 96   Temp 98  F (36.7  C) (Oral)    "Resp 14   Ht 1.93 m (6' 4\")   Wt 83.5 kg (184 lb)   SpO2 99%   BMI 22.40 kg/m     Estimated body mass index is 22.4 kg/m  as calculated from the following:    Height as of this encounter: 1.93 m (6' 4\").    Weight as of this encounter: 83.5 kg (184 lb).    Physical Exam  Vitals and nursing note reviewed.   Constitutional:       Appearance: He is well-developed.   HENT:      Head: Normocephalic and atraumatic.      Right Ear: Tympanic membrane, ear canal and external ear normal.      Left Ear: Tympanic membrane, ear canal and external ear normal.   Eyes:      Pupils: Pupils are equal, round, and reactive to light.   Neck:      Thyroid: No thyromegaly.   Cardiovascular:      Rate and Rhythm: Normal rate and regular rhythm.      Heart sounds: Normal heart sounds.   Pulmonary:      Effort: Pulmonary effort is normal.      Breath sounds: Normal breath sounds.   Abdominal:      General: Bowel sounds are normal.      Palpations: Abdomen is soft. There is no mass.   Musculoskeletal:         General: Normal range of motion.   Lymphadenopathy:      Cervical: No cervical adenopathy.   Skin:     General: Skin is warm and dry.      Findings: No rash.   Neurological:      Mental Status: He is alert and oriented to person, place, and time.   Psychiatric:         Judgment: Judgment normal.             Signed Electronically by: Sabas Cerna MD    Answers submitted by the patient for this visit:  Patient Health Questionnaire (Submitted on 1/6/2025)  If you checked off any problems, how difficult have these problems made it for you to do your work, take care of things at home, or get along with other people?: Somewhat difficult  PHQ9 TOTAL SCORE: 13    "

## 2025-01-08 LAB
ALT SERPL W P-5'-P-CCNC: 31 U/L (ref 0–70)
ANION GAP SERPL CALCULATED.3IONS-SCNC: 10 MMOL/L (ref 7–15)
AST SERPL W P-5'-P-CCNC: 25 U/L (ref 0–45)
BUN SERPL-MCNC: 18.5 MG/DL (ref 6–20)
CALCIUM SERPL-MCNC: 9 MG/DL (ref 8.8–10.4)
CHLORIDE SERPL-SCNC: 102 MMOL/L (ref 98–107)
CREAT SERPL-MCNC: 1.02 MG/DL (ref 0.67–1.17)
EGFRCR SERPLBLD CKD-EPI 2021: >90 ML/MIN/1.73M2
GLUCOSE SERPL-MCNC: 83 MG/DL (ref 70–99)
HCO3 SERPL-SCNC: 26 MMOL/L (ref 22–29)
POTASSIUM SERPL-SCNC: 4.1 MMOL/L (ref 3.4–5.3)
SODIUM SERPL-SCNC: 138 MMOL/L (ref 135–145)

## 2025-01-09 ENCOUNTER — MYC MEDICAL ADVICE (OUTPATIENT)
Dept: FAMILY MEDICINE | Facility: CLINIC | Age: 45
End: 2025-01-09
Payer: COMMERCIAL

## 2025-02-27 ENCOUNTER — MYC MEDICAL ADVICE (OUTPATIENT)
Dept: FAMILY MEDICINE | Facility: CLINIC | Age: 45
End: 2025-02-27
Payer: COMMERCIAL

## 2025-04-17 ENCOUNTER — ANCILLARY PROCEDURE (OUTPATIENT)
Dept: GENERAL RADIOLOGY | Facility: CLINIC | Age: 45
End: 2025-04-17
Attending: FAMILY MEDICINE
Payer: COMMERCIAL

## 2025-04-17 ENCOUNTER — OFFICE VISIT (OUTPATIENT)
Dept: URGENT CARE | Facility: URGENT CARE | Age: 45
End: 2025-04-17
Payer: COMMERCIAL

## 2025-04-17 VITALS
RESPIRATION RATE: 16 BRPM | WEIGHT: 194 LBS | BODY MASS INDEX: 23.61 KG/M2 | OXYGEN SATURATION: 98 % | HEART RATE: 72 BPM | SYSTOLIC BLOOD PRESSURE: 123 MMHG | DIASTOLIC BLOOD PRESSURE: 79 MMHG | TEMPERATURE: 98 F

## 2025-04-17 DIAGNOSIS — M25.531 RIGHT WRIST PAIN: ICD-10-CM

## 2025-04-17 DIAGNOSIS — S69.91XA WRIST INJURY, RIGHT, INITIAL ENCOUNTER: ICD-10-CM
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
## 2025-04-17 DIAGNOSIS — M25.531 RIGHT WRIST PAIN: Primary | ICD-10-CM

## 2025-04-18 NOTE — PATIENT INSTRUCTIONS
Use Tylenol and arnica gel to help with pain control.    Wear wrist splint full time (except for showering) for at least 7 days.  If pain has completely resolved by that point, OK to stop using the splint.  If still having any soreness, please follow up for repeat wrist films to make sure there is no scaphoid fracture (which sometimes doesn't show up on x-rays right away).

## 2025-04-18 NOTE — PROGRESS NOTES
Urgent Care Clinic Visit    Chief Complaint   Patient presents with    Urgent Care     Pt fell in the bathroom yesterday, reports injury of the right hand/wrist.               4/17/2025     7:39 PM   Additional Questions   Roomed by iker bean   Accompanied by wife         4/17/2025   Forms   Any forms needing to be completed Yes         4/17/2025     7:39 PM   Patient Reported Additional Medications   Patient reports taking the following new medications omeprazole 20 mg, prednisone 10mg, etanercept (all prns).

## 2025-04-18 NOTE — PROGRESS NOTES
ICD-10-CM    1. Right wrist pain  M25.531 XR Wrist Right G/E 3 Views     Wrist/Arm/Hand Bracing Supplies Order Wrist Brace; Right; with thumb spica      2. Fall, initial encounter  W19.XXXA Wrist/Arm/Hand Bracing Supplies Order Wrist Brace; Right; with thumb spica      3. Wrist injury, right, initial encounter  S69.91XA Wrist/Arm/Hand Bracing Supplies Order Wrist Brace; Right; with thumb spica        No fracture seen by me on tonight's x-rays.  Radiology review pending.  Concern for possible occult scaphoid fracture given mechanism of injury and snuffbox tenderness on exam, however, so will have pt immobilize the area and follow up for repeat xrays if still having any pain in 7 days.    PLAN:  Patient Instructions   Use Tylenol and arnica gel to help with pain control.    Wear wrist splint full time (except for showering) for at least 7 days.  If pain has completely resolved by that point, OK to stop using the splint.  If still having any soreness, please follow up for repeat wrist films to make sure there is no scaphoid fracture (which sometimes doesn't show up on x-rays right away).    SUBJECTIVE:  Serjio Farris is a 44 year old right-handed male who presents to  today for evaluation of R wrist pain that began after falling in the bathroom while on vacation in [place].  Injury happened about 24 hours ago.  Painful with wrist extension.  Has used Tylenol and some ice on the area.    OBJECTIVE:  /79   Pulse 72   Temp 98  F (36.7  C) (Tympanic)   Resp 16   Wt 88 kg (194 lb)   SpO2 98%   BMI 23.61 kg/m    GEN: well-appearing, in NAD  Ext:  R wrist with minimal swelling, no ecchymosis or erythema noted, point tenderness in the anatomical snuffbox.  Normal sensation in all dermatomes of the R hand.  Pain with wrist extension.  No tenderness elsewhere in the wrist or proximal forearm or hand.   normal...

## 2025-05-06 ASSESSMENT — PATIENT HEALTH QUESTIONNAIRE - PHQ9
SUM OF ALL RESPONSES TO PHQ QUESTIONS 1-9: 7
10. IF YOU CHECKED OFF ANY PROBLEMS, HOW DIFFICULT HAVE THESE PROBLEMS MADE IT FOR YOU TO DO YOUR WORK, TAKE CARE OF THINGS AT HOME, OR GET ALONG WITH OTHER PEOPLE: SOMEWHAT DIFFICULT
SUM OF ALL RESPONSES TO PHQ QUESTIONS 1-9: 7

## 2025-05-07 ENCOUNTER — VIRTUAL VISIT (OUTPATIENT)
Dept: FAMILY MEDICINE | Facility: CLINIC | Age: 45
End: 2025-05-07
Payer: COMMERCIAL

## 2025-05-07 DIAGNOSIS — F90.2 ADHD (ATTENTION DEFICIT HYPERACTIVITY DISORDER), COMBINED TYPE: ICD-10-CM

## 2025-05-07 PROCEDURE — 98005 SYNCH AUDIO-VIDEO EST LOW 20: CPT | Performed by: NURSE PRACTITIONER

## 2025-05-07 RX ORDER — DEXTROAMPHETAMINE SACCHARATE, AMPHETAMINE ASPARTATE, DEXTROAMPHETAMINE SULFATE AND AMPHETAMINE SULFATE 5; 5; 5; 5 MG/1; MG/1; MG/1; MG/1
20 TABLET ORAL 2 TIMES DAILY
Qty: 60 TABLET | Refills: 0 | Status: CANCELLED | OUTPATIENT
Start: 2025-05-07

## 2025-05-07 RX ORDER — DEXTROAMPHETAMINE SACCHARATE, AMPHETAMINE ASPARTATE, DEXTROAMPHETAMINE SULFATE AND AMPHETAMINE SULFATE 5; 5; 5; 5 MG/1; MG/1; MG/1; MG/1
20 TABLET ORAL 2 TIMES DAILY
Qty: 60 TABLET | Refills: 0 | Status: SHIPPED | OUTPATIENT
Start: 2025-06-01 | End: 2025-07-01

## 2025-05-07 RX ORDER — DEXTROAMPHETAMINE SACCHARATE, AMPHETAMINE ASPARTATE, DEXTROAMPHETAMINE SULFATE AND AMPHETAMINE SULFATE 5; 5; 5; 5 MG/1; MG/1; MG/1; MG/1
20 TABLET ORAL 2 TIMES DAILY
Qty: 60 TABLET | Refills: 0 | Status: SHIPPED | OUTPATIENT
Start: 2025-07-01 | End: 2025-07-31

## 2025-05-07 RX ORDER — DEXTROAMPHETAMINE SACCHARATE, AMPHETAMINE ASPARTATE, DEXTROAMPHETAMINE SULFATE AND AMPHETAMINE SULFATE 5; 5; 5; 5 MG/1; MG/1; MG/1; MG/1
20 TABLET ORAL 2 TIMES DAILY
Qty: 60 TABLET | Refills: 0 | Status: SHIPPED | OUTPATIENT
Start: 2025-07-31 | End: 2025-08-30

## 2025-05-07 ASSESSMENT — ENCOUNTER SYMPTOMS
CONSTITUTIONAL NEGATIVE: 1
PALPITATIONS: 0
RESPIRATORY NEGATIVE: 1
HEADACHES: 0
DIZZINESS: 0

## 2025-05-07 NOTE — PROGRESS NOTES
Andrew is a 44 year old who is being evaluated via a billable video visit.    How would you like to obtain your AVS? MyChart  If the video visit is dropped, the invitation should be resent by: Text to cell phone: [phone]  Will anyone else be joining your video visit? No      Assessment & Plan     ADHD (attention deficit hyperactivity disorder), combined type  ADHD well managed with current dose of Adderall. No side effects. PDMP reviewed. No concerns. Refilled today at current dose. Keep follow up with PCP as planned.    - amphetamine-dextroamphetamine (ADDERALL) 20 MG tablet; Take 1 tablet (20 mg) by mouth 2 times daily.  - amphetamine-dextroamphetamine (ADDERALL) 20 MG tablet; Take 1 tablet (20 mg) by mouth 2 times daily.  - amphetamine-dextroamphetamine (ADDERALL) 20 MG tablet; Take 1 tablet (20 mg) by mouth 2 times daily.        Subjective   Andrew is a 44 year old, presenting for the following health issues:  A.D.H.D        5/7/2025     4:14 PM   Additional Questions   Roomed by Kiarra ELIZABETH CMA   Accompanied by KARON         5/7/2025     4:14 PM   Patient Reported Additional Medications   Patient reports taking the following new medications None     History of Present Illness       Reason for visit:  Meds review    He eats 2-3 servings of fruits and vegetables daily.He consumes 2 sweetened beverage(s) daily.He exercises with enough effort to increase his heart rate 20 to 29 minutes per day.  He exercises with enough effort to increase his heart rate 4 days per week.   He is taking medications regularly.        Medication Followup of Adderall   Taking Medication as prescribed: yes  Side Effects:  None  Medication Helping Symptoms:  yes    Here today for 6 month follow up on ADHD medication. Takes Adderall 20mg BID  PDMP reviewed- last fill 5/2/25    Review of Systems   Constitutional: Negative.    Respiratory: Negative.     Cardiovascular:  Negative for chest pain and palpitations.   Neurological:  Negative for  dizziness and headaches.           Objective         Vitals:  No vitals were obtained today due to virtual visit.    Physical Exam   GENERAL: alert and no distress  EYES: Eyes grossly normal to inspection.  No discharge or erythema, or obvious scleral/conjunctival abnormalities.  RESP: No audible wheeze, cough, or visible cyanosis.    SKIN: Visible skin clear. No significant rash, abnormal pigmentation or lesions.  NEURO: Cranial nerves grossly intact.  Mentation and speech appropriate for age.  PSYCH: Appropriate affect, tone, and pace of words        12/2/2024     8:18 PM 1/6/2025     5:14 PM 5/6/2025     7:44 PM   PHQ   PHQ-9 Total Score 11  13  7    Q9: Thoughts of better off dead/self-harm past 2 weeks Not at all Not at all Not at all       Patient-reported           Video-Visit Details    Type of service:  Video Visit   Originating Location (pt. Location): Home    Distant Location (provider location):  On-site  Platform used for Video Visit: Clara  Signed Electronically by: NATALIIA Gold CNP

## 2025-06-04 ENCOUNTER — OFFICE VISIT (OUTPATIENT)
Dept: URGENT CARE | Facility: URGENT CARE | Age: 45
End: 2025-06-04
Payer: COMMERCIAL

## 2025-06-04 VITALS
TEMPERATURE: 98 F | SYSTOLIC BLOOD PRESSURE: 134 MMHG | BODY MASS INDEX: 23.02 KG/M2 | WEIGHT: 189 LBS | OXYGEN SATURATION: 98 % | HEIGHT: 76 IN | RESPIRATION RATE: 16 BRPM | DIASTOLIC BLOOD PRESSURE: 74 MMHG | HEART RATE: 83 BPM

## 2025-06-04 DIAGNOSIS — S05.01XA ABRASION OF RIGHT CORNEA, INITIAL ENCOUNTER: Primary | ICD-10-CM

## 2025-06-04 PROCEDURE — 3078F DIAST BP <80 MM HG: CPT | Performed by: PHYSICIAN ASSISTANT

## 2025-06-04 PROCEDURE — 99213 OFFICE O/P EST LOW 20 MIN: CPT | Performed by: PHYSICIAN ASSISTANT

## 2025-06-04 PROCEDURE — 3075F SYST BP GE 130 - 139MM HG: CPT | Performed by: PHYSICIAN ASSISTANT

## 2025-06-04 PROCEDURE — 1125F AMNT PAIN NOTED PAIN PRSNT: CPT | Performed by: PHYSICIAN ASSISTANT

## 2025-06-04 RX ORDER — ERYTHROMYCIN 5 MG/G
0.5 OINTMENT OPHTHALMIC 4 TIMES DAILY
Qty: 3.5 G | Refills: 0 | Status: SHIPPED | OUTPATIENT
Start: 2025-06-04 | End: 2025-06-09

## 2025-06-04 RX ORDER — MEDROXYPROGESTERONE ACETATE 150 MG/ML
50 INJECTION, SUSPENSION INTRAMUSCULAR
COMMUNITY
Start: 2025-05-15 | End: 2026-05-15

## 2025-06-04 ASSESSMENT — PAIN SCALES - GENERAL: PAINLEVEL_OUTOF10: MODERATE PAIN (5)

## 2025-06-04 NOTE — PROGRESS NOTES
Assessment & Plan     1. Abrasion of right cornea, initial encounter (Primary)  Fluorescein uptake over the cornea, perhaps this is contributing to his blurred vision.  EOMI.  PERRL.  Visual inspection of eye does not appear to have any volume loss, obvious laceration, hyphema, bullous subconjunctival hemorrhage, etc.  He was given prophylactic antibiotics to use, and follow-up with Optometry tomorrow - several office number given  Tylenol for pain  Discussed indications for emergent follow-up, such as loss of vision, severe pain, floaters / flashers etc   - erythromycin (ROMYCIN) 5 MG/GM ophthalmic ointment; Place 0.5 inches into the right eye 4 times daily for 5 days.  Dispense: 3.5 g; Refill: 0    Patient verbalizes understanding. All questions were addressed and answered.     Mel Richards PA-C  [facility]    CHIEF COMPLAINT:   Chief Complaint   Patient presents with    Urgent Care     Blurry vision. Light sendittivity   after hs dog scratched his eye with his paw this AM     Subjective     Andrew is a 44 year old male who presents to clinic today for evaluation of eye injury. This AM, patient's 10-week-old puppy was over top of him, and his paw scratched his right eye. He had immediate pain. Has now had increased blurry vision throughout the day.  No loss of vision, floaters or flashers.   He does not wear contact lenses.      Past Medical History:   Diagnosis Date    Depressive disorder 2012/2013    Due to my stomach issues and not being sorted. OK now    Earache or other ear, nose, or throat complaint 2009 - Present    I get many ulcers in my nose & throat when I have a flare up    History of colonic polyps 04/27/2015    This is the first time I've been aware that I've had polyps    Stroke (H) age 25 approx    possible    Ulcer, gastric, acute 2009 - Present    Numerous ulcers in my my mouth (lips, cheeks, roof & throat)     Past Surgical History:   Procedure Laterality Date     COLONOSCOPY N/A 04/27/2015    Procedure: COLONOSCOPY;  Surgeon: Pako Iqbal MD;  Location: [facility] OR    COLONOSCOPY WITH CO2 INSUFFLATION N/A 04/27/2015    Procedure: COLONOSCOPY WITH CO2 INSUFFLATION;  Surgeon: Pako Iqbal MD;  Location: [facility] OR    ESOPHAGOSCOPY, GASTROSCOPY, DUODENOSCOPY (EGD), COMBINED Left 05/28/2015    Procedure: COMBINED ESOPHAGOSCOPY, GASTROSCOPY, DUODENOSCOPY (EGD), BIOPSY SINGLE OR MULTIPLE;  Surgeon: Pako Iqbal MD;  Location: [facility]    HC BREATH HYDROGEN TEST N/A 09/11/2015    Procedure: HYDROGEN BREATH TEST;  Surgeon: Pako Iqbal MD;  Location: [facility]    HC TOOTH EXTRACTION W/FORCEP      age 15    testicular torsion  2010    UROLOGY PROCEDURE                         DATE:  01/01/2012    Testicular Torsion     Social History     Tobacco Use    Smoking status: Former     Current packs/day: 0.00     Average packs/day: 0.1 packs/day for 15.7 years (1.6 ttl pk-yrs)     Types: Cigarettes     Start date: 1/1/1999     Quit date: 9/1/2014     Years since quitting: 10.7    Smokeless tobacco: Never    Tobacco comments:     3 cigs day 13 years   Substance Use Topics    Alcohol use: Not Currently     Comment: Extremely rarely (major occassasions) bad affects on stomach     Current Outpatient Medications   Medication Sig Dispense Refill    amphetamine-dextroamphetamine (ADDERALL) 20 MG tablet Take 1 tablet (20 mg) by mouth 2 times daily. 60 tablet 0    [START ON 7/1/2025] amphetamine-dextroamphetamine (ADDERALL) 20 MG tablet Take 1 tablet (20 mg) by mouth 2 times daily. 60 tablet 0    [START ON 7/31/2025] amphetamine-dextroamphetamine (ADDERALL) 20 MG tablet Take 1 tablet (20 mg) by mouth 2 times daily. 60 tablet 0    colchicine (COLCRYS) 0.6 MG tablet Take 1 tablet (0.6 mg) by mouth 2 times daily 180 tablet 3    ENBREL SURECLICK 50 MG/ML autoinjector Inject 50 mg subcutaneously.      erythromycin (ROMYCIN) 5 MG/GM ophthalmic ointment Place 0.5 inches into the right eye 4 times daily for 5  "days. 3.5 g 0    sertraline (ZOLOFT) 25 MG tablet Take 1 tablet (25 mg) by mouth daily. Take along with 50 mg for 75 mg daily. 90 tablet 1    sertraline (ZOLOFT) 50 MG tablet Take 1 tablet (50 mg) by mouth daily. Take along with 25 mg for 75 mg daily. 90 tablet 1    traZODone (DESYREL) 50 MG tablet Take 1.5 tablets (75 mg) by mouth at bedtime. 135 tablet 1    HUMIRA, 2 PEN, 40 MG/0.4ML pen kit Inject 40 mg subcutaneously every 14 days. (Patient not taking: Reported on 6/4/2025)       No current facility-administered medications for this visit.     Allergies   Allergen Reactions    Ibuprofen GI Disturbance       10 point ROS of systems were all negative except for pertinent positives noted in my HPI.      Exam:   /74   Pulse 83   Temp 98  F (36.7  C) (Tympanic)   Resp 16   Ht 1.93 m (6' 4\")   Wt 85.7 kg (189 lb)   SpO2 98%   BMI 23.01 kg/m    Physical Exam  Constitutional:       Appearance: Normal appearance.   HENT:      Head: Normocephalic and atraumatic.   Eyes:      General: Lids are normal.         Right eye: No foreign body or discharge.      Extraocular Movements: Extraocular movements intact.      Right eye: Normal extraocular motion.      Conjunctiva/sclera: Conjunctivae normal.      Right eye: Right conjunctiva is not injected. No chemosis.     Pupils: Pupils are equal, round, and reactive to light.      Right eye: Corneal abrasion (over the cornea) and fluorescein uptake present.   Neurological:      Mental Status: He is alert.                 "

## 2025-06-04 NOTE — PATIENT INSTRUCTIONS
Use the ointment as prescribed, four times daily for 3-5 days  Tylenol for pain    Make an appt with eye specialist for follow-up tomorrow:    [facility]:  [phone]  [facility]  [phone]

## 2025-06-04 NOTE — PROGRESS NOTES
Urgent Care Clinic Visit    Chief Complaint   Patient presents with    Urgent Care     Blurry vision. Light sendittivity   after hs dog scratched his eye with his paw this AM               6/4/2025     2:54 PM   Additional Questions   Roomed by :tamanna UMAÑA   Accompanied by self           VISION   Wears glasses: worn for testing  Tool used: [place]   Right eye:        10/50 (20/100)  Left eye:          10/16 (20/32)

## 2025-06-09 ENCOUNTER — PATIENT OUTREACH (OUTPATIENT)
Dept: CARE COORDINATION | Facility: CLINIC | Age: 45
End: 2025-06-09
Payer: COMMERCIAL